# Patient Record
Sex: FEMALE | Race: ASIAN | NOT HISPANIC OR LATINO | URBAN - METROPOLITAN AREA
[De-identification: names, ages, dates, MRNs, and addresses within clinical notes are randomized per-mention and may not be internally consistent; named-entity substitution may affect disease eponyms.]

---

## 2023-01-21 ENCOUNTER — INPATIENT (INPATIENT)
Facility: HOSPITAL | Age: 68
LOS: 5 days | Discharge: HOME | DRG: 280 | End: 2023-01-27
Attending: HOSPITALIST | Admitting: INTERNAL MEDICINE
Payer: COMMERCIAL

## 2023-01-21 VITALS
SYSTOLIC BLOOD PRESSURE: 126 MMHG | RESPIRATION RATE: 20 BRPM | WEIGHT: 160.06 LBS | HEIGHT: 65 IN | HEART RATE: 95 BPM | DIASTOLIC BLOOD PRESSURE: 78 MMHG | OXYGEN SATURATION: 95 % | TEMPERATURE: 100 F

## 2023-01-21 DIAGNOSIS — I21.4 NON-ST ELEVATION (NSTEMI) MYOCARDIAL INFARCTION: ICD-10-CM

## 2023-01-21 LAB
ALBUMIN SERPL ELPH-MCNC: 3.9 G/DL — SIGNIFICANT CHANGE UP (ref 3.3–5)
ALP SERPL-CCNC: 88 U/L — SIGNIFICANT CHANGE UP (ref 40–120)
ALT FLD-CCNC: 70 U/L — HIGH (ref 10–45)
ANION GAP SERPL CALC-SCNC: 14 MMOL/L — SIGNIFICANT CHANGE UP (ref 5–17)
APTT BLD: 28.2 SEC — SIGNIFICANT CHANGE UP (ref 27.5–35.5)
AST SERPL-CCNC: 333 U/L — HIGH (ref 10–40)
BASOPHILS # BLD AUTO: 0.01 K/UL — SIGNIFICANT CHANGE UP (ref 0–0.2)
BASOPHILS NFR BLD AUTO: 0.1 % — SIGNIFICANT CHANGE UP (ref 0–2)
BILIRUB SERPL-MCNC: 0.8 MG/DL — SIGNIFICANT CHANGE UP (ref 0.2–1.2)
BUN SERPL-MCNC: 10 MG/DL — SIGNIFICANT CHANGE UP (ref 7–23)
CALCIUM SERPL-MCNC: 9.7 MG/DL — SIGNIFICANT CHANGE UP (ref 8.4–10.5)
CHLORIDE SERPL-SCNC: 93 MMOL/L — LOW (ref 96–108)
CO2 SERPL-SCNC: 23 MMOL/L — SIGNIFICANT CHANGE UP (ref 22–31)
CREAT SERPL-MCNC: 0.6 MG/DL — SIGNIFICANT CHANGE UP (ref 0.5–1.3)
D DIMER BLD IA.RAPID-MCNC: 621 NG/ML DDU — HIGH
EGFR: 99 ML/MIN/1.73M2 — SIGNIFICANT CHANGE UP
EOSINOPHIL # BLD AUTO: 0 K/UL — SIGNIFICANT CHANGE UP (ref 0–0.5)
EOSINOPHIL NFR BLD AUTO: 0 % — SIGNIFICANT CHANGE UP (ref 0–6)
FLUAV AG NPH QL: SIGNIFICANT CHANGE UP
FLUBV AG NPH QL: SIGNIFICANT CHANGE UP
GLUCOSE SERPL-MCNC: 128 MG/DL — HIGH (ref 70–99)
HCT VFR BLD CALC: 42.1 % — SIGNIFICANT CHANGE UP (ref 34.5–45)
HGB BLD-MCNC: 13.8 G/DL — SIGNIFICANT CHANGE UP (ref 11.5–15.5)
IMM GRANULOCYTES NFR BLD AUTO: 0.6 % — SIGNIFICANT CHANGE UP (ref 0–0.9)
INR BLD: 1.11 RATIO — SIGNIFICANT CHANGE UP (ref 0.88–1.16)
LYMPHOCYTES # BLD AUTO: 1.15 K/UL — SIGNIFICANT CHANGE UP (ref 1–3.3)
LYMPHOCYTES # BLD AUTO: 8 % — LOW (ref 13–44)
MAGNESIUM SERPL-MCNC: 1.7 MG/DL — SIGNIFICANT CHANGE UP (ref 1.6–2.6)
MCHC RBC-ENTMCNC: 29.1 PG — SIGNIFICANT CHANGE UP (ref 27–34)
MCHC RBC-ENTMCNC: 32.8 GM/DL — SIGNIFICANT CHANGE UP (ref 32–36)
MCV RBC AUTO: 88.6 FL — SIGNIFICANT CHANGE UP (ref 80–100)
MONOCYTES # BLD AUTO: 1.18 K/UL — HIGH (ref 0–0.9)
MONOCYTES NFR BLD AUTO: 8.2 % — SIGNIFICANT CHANGE UP (ref 2–14)
NEUTROPHILS # BLD AUTO: 11.9 K/UL — HIGH (ref 1.8–7.4)
NEUTROPHILS NFR BLD AUTO: 83.1 % — HIGH (ref 43–77)
NRBC # BLD: 0 /100 WBCS — SIGNIFICANT CHANGE UP (ref 0–0)
NT-PROBNP SERPL-SCNC: 2945 PG/ML — HIGH (ref 0–300)
PLATELET # BLD AUTO: 334 K/UL — SIGNIFICANT CHANGE UP (ref 150–400)
POTASSIUM SERPL-MCNC: 4.2 MMOL/L — SIGNIFICANT CHANGE UP (ref 3.5–5.3)
POTASSIUM SERPL-SCNC: 4.2 MMOL/L — SIGNIFICANT CHANGE UP (ref 3.5–5.3)
PROT SERPL-MCNC: 8.2 G/DL — SIGNIFICANT CHANGE UP (ref 6–8.3)
PROTHROM AB SERPL-ACNC: 12.9 SEC — SIGNIFICANT CHANGE UP (ref 10.5–13.4)
RBC # BLD: 4.75 M/UL — SIGNIFICANT CHANGE UP (ref 3.8–5.2)
RBC # FLD: 13.5 % — SIGNIFICANT CHANGE UP (ref 10.3–14.5)
RSV RNA NPH QL NAA+NON-PROBE: SIGNIFICANT CHANGE UP
SARS-COV-2 RNA SPEC QL NAA+PROBE: SIGNIFICANT CHANGE UP
SODIUM SERPL-SCNC: 130 MMOL/L — LOW (ref 135–145)
TROPONIN T, HIGH SENSITIVITY RESULT: 3979 NG/L — HIGH (ref 0–51)
WBC # BLD: 14.32 K/UL — HIGH (ref 3.8–10.5)
WBC # FLD AUTO: 14.32 K/UL — HIGH (ref 3.8–10.5)

## 2023-01-21 PROCEDURE — 99291 CRITICAL CARE FIRST HOUR: CPT

## 2023-01-21 PROCEDURE — 71275 CT ANGIOGRAPHY CHEST: CPT | Mod: 26,MA

## 2023-01-21 PROCEDURE — 71046 X-RAY EXAM CHEST 2 VIEWS: CPT | Mod: 26

## 2023-01-21 RX ORDER — ATORVASTATIN CALCIUM 80 MG/1
80 TABLET, FILM COATED ORAL AT BEDTIME
Refills: 0 | Status: DISCONTINUED | OUTPATIENT
Start: 2023-01-21 | End: 2023-01-27

## 2023-01-21 RX ORDER — FUROSEMIDE 40 MG
40 TABLET ORAL ONCE
Refills: 0 | Status: COMPLETED | OUTPATIENT
Start: 2023-01-21 | End: 2023-01-21

## 2023-01-21 RX ORDER — TICAGRELOR 90 MG/1
180 TABLET ORAL ONCE
Refills: 0 | Status: COMPLETED | OUTPATIENT
Start: 2023-01-21 | End: 2023-01-21

## 2023-01-21 RX ORDER — ASPIRIN/CALCIUM CARB/MAGNESIUM 324 MG
324 TABLET ORAL ONCE
Refills: 0 | Status: COMPLETED | OUTPATIENT
Start: 2023-01-21 | End: 2023-01-21

## 2023-01-21 RX ORDER — HEPARIN SODIUM 5000 [USP'U]/ML
5000 INJECTION INTRAVENOUS; SUBCUTANEOUS ONCE
Refills: 0 | Status: COMPLETED | OUTPATIENT
Start: 2023-01-21 | End: 2023-01-21

## 2023-01-21 RX ORDER — HEPARIN SODIUM 5000 [USP'U]/ML
INJECTION INTRAVENOUS; SUBCUTANEOUS
Qty: 25000 | Refills: 0 | Status: DISCONTINUED | OUTPATIENT
Start: 2023-01-21 | End: 2023-01-22

## 2023-01-21 RX ORDER — HEPARIN SODIUM 5000 [USP'U]/ML
5000 INJECTION INTRAVENOUS; SUBCUTANEOUS EVERY 6 HOURS
Refills: 0 | Status: DISCONTINUED | OUTPATIENT
Start: 2023-01-21 | End: 2023-01-22

## 2023-01-21 RX ORDER — ASPIRIN/CALCIUM CARB/MAGNESIUM 324 MG
81 TABLET ORAL DAILY
Refills: 0 | Status: DISCONTINUED | OUTPATIENT
Start: 2023-01-21 | End: 2023-01-27

## 2023-01-21 RX ADMIN — Medication 324 MILLIGRAM(S): at 18:47

## 2023-01-21 RX ADMIN — TICAGRELOR 180 MILLIGRAM(S): 90 TABLET ORAL at 18:47

## 2023-01-21 RX ADMIN — Medication 40 MILLIGRAM(S): at 22:39

## 2023-01-21 RX ADMIN — HEPARIN SODIUM 1000 UNIT(S)/HR: 5000 INJECTION INTRAVENOUS; SUBCUTANEOUS at 18:48

## 2023-01-21 RX ADMIN — HEPARIN SODIUM 5000 UNIT(S): 5000 INJECTION INTRAVENOUS; SUBCUTANEOUS at 18:48

## 2023-01-21 NOTE — ED ADULT NURSE NOTE - OBJECTIVE STATEMENT
67y female w/ no known medical history presents to ED w/ chest pain. Pt states she began to have chest pain last night while at rest. The pain does not radiate but also endorses abdominal pain and lower back pain. Pt also states that she has been having intermittent nausea with one episode of vomiting last night. Pt went to urgent care today where they performed an EKG; As per patient, urgent care advised her to come to ED due to abnormal EKG. Pt is placed on cardiac monitor in the ED in normal sinus rhythm. Pt denies fever, chills, sob, diarrhea urinary symptoms. Pt is ambulatory.

## 2023-01-21 NOTE — H&P ADULT - NSHPLABSRESULTS_GEN_ALL_CORE
13.8   14.32 )-----------( 334      ( 21 Jan 2023 17:18 )             42.1     01-21    130<L>  |  93<L>  |  10  ----------------------------<  128<H>  4.2   |  23  |  0.60    Ca    9.7      21 Jan 2023 17:18  Mg     1.7     01-21    TPro  8.2  /  Alb  3.9  /  TBili  0.8  /  DBili  x   /  AST  333<H>  /  ALT  70<H>  /  AlkPhos  88  01-21

## 2023-01-21 NOTE — H&P ADULT - NSICDXPASTMEDICALHX_GEN_ALL_CORE_FT
EGD & Upper EUS with radial scope then linear with FNA  Estimated body mass index is 22.62 kg/m² as calculated from the following:    Height as of 9/6/22: 6' 3\" (1.905 m).    Weight as of 10/4/22: 82.1 kg (181 lb).   PAST MEDICAL HISTORY:  No pertinent past medical history

## 2023-01-21 NOTE — ED PROVIDER NOTE - NS ED ROS FT
Gen: Denies fever, weight loss; +lightheadedness  HEENT: Denies vision changes, ear pain, epistaxis, sore throat  CV: Denies palpitations; +chest pain, +diaphoresis  Skin: Denies rash, erythema, color changes  Resp: Denies SOB, cough  Endo: Denies sensitivity to heat, cold, increased urination  GI: Denies abdominal pain, constipation, diarrhea; + nausea, +vomiting  Msk: Denies back pain, LE swelling, extremity pain  : Denies dysuria, increased frequency  Neuro: Denies LOC, weakness, numbness, tingling  Psych: Denies hx of psych, hallucinations  ROS statement: all other ROS negative except as per HPI

## 2023-01-21 NOTE — ED PROVIDER NOTE - PROGRESS NOTE DETAILS
Katiana Adams PGY3: Labs show elevated trop 3979 and proBNP 2945 i/s/o nml SCr 0.60 as well as elevated D-dimer 621. CXR also concerning for small b/l pleural effusions. Concerning for NSTEMI vs. new-onset CHF vs. PE. Cardiology consulted. Recommended to send for CTA to r/o PE and to start ASA/Brilinta/Heparin gtt. Pt made aware who agreed with plan and further testing. Will await CTA results and further cards recs. Ne, PGY3: No PE on CTA. Discussed with cards. Will admit to CCU.

## 2023-01-21 NOTE — H&P ADULT - NSHPPHYSICALEXAM_GEN_ALL_CORE
T(C): 36.7 (01-21-23 @ 21:12), Max: 37.6 (01-21-23 @ 16:44)  HR: 89 (01-21-23 @ 21:12) (85 - 95)  BP: 107/69 (01-21-23 @ 21:12) (107/69 - 126/78)  RR: 18 (01-21-23 @ 21:12) (17 - 20)  SpO2: 97% (01-21-23 @ 21:12) (95% - 97%)    CONSTITUTIONAL: Well groomed, no apparent distress  EYES: PERRLA and symmetric, EOMI, No conjunctival or scleral injection, non-icteric  ENMT: Oral mucosa with moist membranes. Normal dentition; no pharyngeal injection or exudates             NECK: Supple, symmetric and without tracheal deviation   RESP: No respiratory distress, no use of accessory muscles; CTA b/l, no WRR  CV: RRR, +S1S2, no MRG; no JVD; no peripheral edema  GI: Soft, NT, ND, no rebound, no guarding; no palpable masses; no hepatosplenomegaly; no hernia palpated  LYMPH: No cervical LAD or tenderness; no axillary LAD or tenderness; no inguinal LAD or tenderness  MSK: Normal gait; No digital clubbing or cyanosis; examination of the (head/neck/spine/ribs/pelvis, RUE, LUE, RLE, LLE) without misalignment,            Normal ROM without pain, no spinal tenderness, normal muscle strength/tone  SKIN: No rashes or ulcers noted; no subcutaneous nodules or induration palpable  NEURO: CN II-XII intact; normal reflexes in upper and lower extremities, sensation intact in upper and lower extremities b/l to light touch   PSYCH: Appropriate insight/judgment; A+O x 3, mood and affect appropriate, recent/remote memory intact

## 2023-01-21 NOTE — ED PROVIDER NOTE - PHYSICAL EXAMINATION
PHYSICAL EXAM:  GENERAL: non-toxic appearing; in no respiratory distress  HEENT: Atraumatic, Normocephalic, PERRL, EOMs intact b/l w/out deficits, no conjunctival pallor, MMM  NECK: No JVD; FROM  CHEST/LUNG: CTAB no wheezes/rhonchi/rales  HEART: RRR, systolic ejection murmur  ABDOMEN: +BS, soft, NT, ND  EXTREMITIES: No LE edema, +2 radial pulses b/l, +2 DP/PT pulses b/l  MUSCULOSKELETAL: FROM of all 4 extremities  NERVOUS SYSTEM:  A&Ox3, No motor deficits or sensory deficits; no focal neurologic deficits  SKIN:  No new rashes

## 2023-01-21 NOTE — CONSULT NOTE ADULT - SUBJECTIVE AND OBJECTIVE BOX
CARDIOLOGY FELLOW CONSULT NOTE    HPI: 67-year-old female, denies past medical history, presents to ED complaining of pleuritic midsternal chest pain with deep inspiration. This started around yesterday, and was ssociated with  since yesterday associated with lightheadedness diaphoresis backaches and nausea/vomiting.  Patient has never had similar symptoms in the past.  Patient tried's (last Motrin/Tylenol with mild relief.  Patient went to urgent care today and EKG was noted to be abnormal.  Patient's daughter is NP and auscultated the heart and noticed new systolic murmur.  Patient does not have a cardiologist and a provider in many years.  Patient denies fever/chills, shortness of breath, abdominal pain, diarrhea, recent travel, recent surgeries, hemoptysis, urinary symptoms, lower extremity edema or any other symptoms at this time.    PMHx:   No pertinent past medical history    PSHx:     Allergies:  No Known Allergies    Home Meds: none    Current Medications:   furosemide   Injectable 40 milliGRAM(s) IV Push Once  heparin   Injectable 5000 Unit(s) IV Push every 6 hours PRN  heparin  Infusion.  Unit(s)/Hr IV Continuous <Continuous>    FAMILY HISTORY:  Denies    Social History: nonsmoker, nondrinker, no illicits. Daughter works in [employer] as an NP     REVIEW OF SYSTEMS: 14pt ros neg unless stated above    Physical Exam:  T(F): 98.1 (01-21), Max: 99.7 (01-21)  HR: 89 (01-21) (85 - 95)  BP: 107/69 (01-21) (107/69 - 126/78)  RR: 18 (01-21)  SpO2: 97% (01-21)  GENERAL: \mild distress, unable to take deep breaths in. reported pleuritic chest pain   HEAD:  Atraumatic, Normocephalic  ENT: EOMI, PERRLA, conjunctiva and sclera clear, Neck supple, JVD to midneck  CHEST/LUNG: decreased breath sounds at the bases, inspiratory crackles   BACK: No spinal tenderness  HEART: Regular rate and rhythm; No murmurs, rubs, or gallops  ABDOMEN: Soft, Nontender, Nondistended; Bowel sounds present  EXTREMITIES:  No clubbing, cyanosis, or edema  PSYCH: Nl behavior, nl affect  NEUROLOGY: AAOx3, non-focal, cranial nerves intact  SKIN: Normal color, No rashes or lesions    ECG: no prior EKG in the system  sinus rhythm, left axis deviation, with right bundyloid pattern "brugada type i pattern", with slurred s waves inferiorly and laterally    CXR: Hilar congestion and bilateral pleural effusions    Labs: Personally reviewed                        13.8   14.32 )-----------( 334      ( 21 Jan 2023 17:18 )             42.1     01-21    130<L>  |  93<L>  |  10  ----------------------------<  128<H>  4.2   |  23  |  0.60    Ca    9.7      21 Jan 2023 17:18  Mg     1.7     01-21    TPro  8.2  /  Alb  3.9  /  TBili  0.8  /  DBili  x   /  AST  333<H>  /  ALT  70<H>  /  AlkPhos  88  01-21    PT/INR - ( 21 Jan 2023 17:18 )   PT: 12.9 sec;   INR: 1.11 ratio         PTT - ( 21 Jan 2023 17:18 )  PTT:28.2 sec    CARDIAC MARKERS ( 21 Jan 2023 17:18 )  3979 ng/L / x     / x     / 2941 U/L / x     / 233.1 ng/mL        Serum Pro-Brain Natriuretic Peptide: 2945 pg/mL (01-21 @ 17:18)   CARDIOLOGY FELLOW CONSULT NOTE    HPI: 67-year-old female, denies past medical history, presents to ED complaining of pleuritic midsternal chest pain with deep inspiration. This started around yesterdayand was associated with diaphoresis, lightheadedness and nausea/vomiting x1.  Patient has never had similar symptoms in the past, no prior anginal equivalents. Daughter tried the following medication cocktail of aspirin 325mg, motrin/tylenol, and GI cocktail with temporary relief but would recur each and every time.    Patient went to urgent care today and EKG was noted to be abnormal, so she was triaged to ED.  Patient does not have a cardiologist, nor does she have a GP. Reportedly no PMHx. On ROS, Patient denies fever/chills, shortness of breath, abdominal pain, diarrhea, recent travel, recent surgeries, hemoptysis, urinary symptoms, lower extremity edema.    I saw patient in the ED where she was with BP 120s/70s, HR 80s-90s in sinus rhythm. She was complaining of orthopnea and shallow breaths limited by pleuritic pain but did not have accessory muscle use. Labs with leukocytosis to 14.32, Trop T 3979, CK 2941, CKMB 233.1, PK mass assay % 7.9, proBNP 2945. d-dimer 621.  COVID/flu/rsv negative  XR showed bilateral pleural effusions and pulmonary congestion. CTPE ordered by ED without PE.  EKG with RBBB (Unknown chronicity) and slurred S waves inferolaterally. Query q waves in V1-V2 with coved STE2-3mm in V1-V2  Patient was triaged to CICU for further management, for concern late presentation MI      In the ED, she received dapt, hep bolus and gtt, and lasix 40mg to good effect    PMHx:   No pertinent past medical history    PSHx: Denies    Allergies:  No Known Allergies    Home Meds: none    Current Medications:   furosemide   Injectable 40 milliGRAM(s) IV Push Once  heparin   Injectable 5000 Unit(s) IV Push every 6 hours PRN  heparin  Infusion.  Unit(s)/Hr IV Continuous <Continuous>    FAMILY HISTORY:  Denies    Social History: nonsmoker, nondrinker, no illicits. Daughter works in [employer] as an NP  Patient works as an ECG tech     REVIEW OF SYSTEMS: 14pt ros neg unless stated above    Physical Exam:  T(F): 98.1 (01-21), Max: 99.7 (01-21)  HR: 89 (01-21) (85 - 95)  BP: 107/69 (01-21) (107/69 - 126/78)  RR: 18 (01-21)  SpO2: 97% (01-21)  GENERAL: \mild distress, unable to take deep breaths in. reported pleuritic chest pain   HEAD:  Atraumatic, Normocephalic  ENT: EOMI, PERRLA, conjunctiva and sclera clear, Neck supple, JVD to midneck  CHEST/LUNG: decreased breath sounds at the bases, inspiratory crackles   BACK: No spinal tenderness  HEART: Regular rate and rhythm; No murmurs, rubs, or gallops  ABDOMEN: Soft, Nontender, Nondistended; Bowel sounds present  EXTREMITIES:  No clubbing, cyanosis, or edema  PSYCH: Nl behavior, nl affect  NEUROLOGY: AAOx3, non-focal, cranial nerves intact  SKIN: Normal color, No rashes or lesions    ECG: no prior EKG in the system  sinus rhythm, left axis deviation, with right bundyloid pattern "brugada type i pattern", with slurred s waves inferiorly and laterally    CXR: Hilar congestion and bilateral pleural effusions    Labs: Personally reviewed                        13.8   14.32 )-----------( 334      ( 21 Jan 2023 17:18 )             42.1     01-21    130<L>  |  93<L>  |  10  ----------------------------<  128<H>  4.2   |  23  |  0.60    Ca    9.7      21 Jan 2023 17:18  Mg     1.7     01-21    TPro  8.2  /  Alb  3.9  /  TBili  0.8  /  DBili  x   /  AST  333<H>  /  ALT  70<H>  /  AlkPhos  88  01-21    PT/INR - ( 21 Jan 2023 17:18 )   PT: 12.9 sec;   INR: 1.11 ratio         PTT - ( 21 Jan 2023 17:18 )  PTT:28.2 sec    CARDIAC MARKERS ( 21 Jan 2023 17:18 )  3979 ng/L / x     / x     / 2941 U/L / x     / 233.1 ng/mL        Serum Pro-Brain Natriuretic Peptide: 2945 pg/mL (01-21 @ 17:18)

## 2023-01-21 NOTE — ED PROVIDER NOTE - OBJECTIVE STATEMENT
67-year-old female, denies past medical history, presents to ED complaining of pleuritic midsternal chest pain since yesterday associated with lightheadedness diaphoresis backaches and nausea/vomiting.  Patient has never had similar symptoms in the past.  Patient tried's (last Motrin/Tylenol with mild relief.  Patient went to urgent care today and EKG was noted to be abnormal.  Patient's daughter is NP and auscultated the heart and noticed new systolic murmur.  Patient does not have a cardiologist and a provider in many years.  Patient denies fever/chills, shortness of breath, abdominal pain, diarrhea, recent travel, recent surgeries, hemoptysis, urinary symptoms, lower extremity edema or any other symptoms at this time.

## 2023-01-21 NOTE — H&P ADULT - HISTORY OF PRESENT ILLNESS
67-year-old female, denies past medical history, presents to ED complaining of pleuritic midsternal chest pain w deep inspiration since yesterday associated with lightheadedness diaphoresis backaches and nausea/vomiting.  Patient has never had similar symptoms in the past.  Patient tried's (last Motrin/Tylenol with mild relief.  Patient went to urgent care today and EKG was noted to be abnormal.  Patient's daughter is NP and auscultated the heart and noticed new systolic murmur.  Patient does not have a cardiologist and a provider in many years.  Patient denies fever/chills, shortness of breath, abdominal pain, diarrhea, recent travel, recent surgeries, hemoptysis, urinary symptoms, lower extremity edema or any other symptoms at this time.    Pt found to have elevated cardiac enzymes and EKG concerning for a late presentation MI. Pt transferred to CCU for further mgmt.

## 2023-01-21 NOTE — H&P ADULT - NSHPREVIEWOFSYSTEMS_GEN_ALL_CORE
REVIEW OF SYSTEMS  General:	  Skin/Breast:  Ophthalmologic:	  ENMT:	  Respiratory and Thorax:	  Cardiovascular:	  Gastrointestinal:	  Genitourinary:	  Musculoskeletal:	  Neurological:	  Psychiatric:	  Hematology/Lymphatics:	  Endocrine:	  Allergic/Immunologic:

## 2023-01-21 NOTE — ED ADULT NURSE REASSESSMENT NOTE - NS ED NURSE REASSESS COMMENT FT1
Hand-off received from LAUREN Horne. Pt is A&Ox4, follows commands, breathing spontaneous and unlabored. Pt currently has Heparin going at 10 ml/hr. Pt to be transported to CICU, awaiting CICU MD for transport.

## 2023-01-21 NOTE — ED PROVIDER NOTE - CLINICAL SUMMARY MEDICAL DECISION MAKING FREE TEXT BOX
67-year-old female, denies past medical history, presents to ED complaining of pleuritic midsternal chest pain since yesterday. Denies f/c, SOB, abd pain, urinary sx, LE edema.  VSS. Physical exam significant for systolic murmur and CTAB lungs. EKG w/ non-specific TWI, but no overt SHARON.  DDx includes ACS vs. PE vs. Viral syndrome  Plan to check basic labs, trop, bnp, d-dimer, and CXR. Pt likely will require admission for stress/echo. 67-year-old female, denies past medical history, presents to ED complaining of pleuritic midsternal chest pain since yesterday. Denies f/c, SOB, abd pain, urinary sx, LE edema.  VSS. Physical exam significant for systolic murmur and CTAB lungs. EKG w/ non-specific TWI, but no overt SHARON.  DDx includes ACS vs. PE vs. Viral syndrome  Plan to check basic labs, trop, bnp, d-dimer, and CXR. Pt likely will require admission for stress/echo.    see attending attestation authored by me, John Stephens MD, for additional MDM details.

## 2023-01-21 NOTE — CONSULT NOTE ADULT - ASSESSMENT
67-year-old female, denies past medical history, presents to ED complaining of pleuritic midsternal chest pain. Her clinical picture ultimately consistent and concerning for late presentation MI. Her EKG shows a right bundyloid pattern, automated read as "Brugada I" but alternatively with q waves in septal region with coved SHARON 2-3mm.   She is now ~36h from symptom onset. On exam, she was originally with crackles bilaterally and JVD to angle of mandible.   She otherwise does not have a significant DILIA that would suggest MR from ruptured papillary or VSD.     Plan:  - CICU for high risk ACS  - TTE   - DAPT and atorva, continue hep bolus and gtt  - Diurese until can lay flat. Hold beta blocker as she was killips class II on presentation   - Consideration of LHC in the AM when more euvolemic    Case discussed overnight with Dr. Martínez Baron, interventional attending on call.

## 2023-01-21 NOTE — ED PROVIDER NOTE - ATTENDING CONTRIBUTION TO CARE
66 yo female p/w intermittent chest pain.  EKG here with ?st elevation though isolated primarily to V2.  sent CBC, CMP, trop -- first trop markedly elevated.  dimer elevated as well though lower suspicion for PE.  cards consulted -- start heparin, antithrombotics.  will get CT angio chest, then admit for further ACS management.

## 2023-01-21 NOTE — ED PROVIDER NOTE - INTERPRETATION
EKG reviewed for rate, rhythm, axis, intervals and segments, including QRS morphology, P wave appearance T wave appearance, FL interval, and QT interval.  I find the EKG to be unremarkable in all of these regards except as follows: RBBB, ?St elev V2

## 2023-01-22 LAB
A1C WITH ESTIMATED AVERAGE GLUCOSE RESULT: 5.4 % — SIGNIFICANT CHANGE UP (ref 4–5.6)
ALBUMIN SERPL ELPH-MCNC: 4 G/DL — SIGNIFICANT CHANGE UP (ref 3.3–5)
ALP SERPL-CCNC: 94 U/L — SIGNIFICANT CHANGE UP (ref 40–120)
ALT FLD-CCNC: 90 U/L — HIGH (ref 10–45)
ANION GAP SERPL CALC-SCNC: 14 MMOL/L — SIGNIFICANT CHANGE UP (ref 5–17)
APTT BLD: 62.6 SEC — HIGH (ref 27.5–35.5)
APTT BLD: 65 SEC — HIGH (ref 27.5–35.5)
AST SERPL-CCNC: 329 U/L — HIGH (ref 10–40)
BASOPHILS # BLD AUTO: 0.02 K/UL — SIGNIFICANT CHANGE UP (ref 0–0.2)
BASOPHILS NFR BLD AUTO: 0.1 % — SIGNIFICANT CHANGE UP (ref 0–2)
BILIRUB SERPL-MCNC: 1.4 MG/DL — HIGH (ref 0.2–1.2)
BLD GP AB SCN SERPL QL: NEGATIVE — SIGNIFICANT CHANGE UP
BUN SERPL-MCNC: 9 MG/DL — SIGNIFICANT CHANGE UP (ref 7–23)
CALCIUM SERPL-MCNC: 9.6 MG/DL — SIGNIFICANT CHANGE UP (ref 8.4–10.5)
CHLORIDE SERPL-SCNC: 90 MMOL/L — LOW (ref 96–108)
CK MB BLD-MCNC: 4.7 % — HIGH (ref 0–3.5)
CK MB BLD-MCNC: 5.5 % — HIGH (ref 0–3.5)
CK MB CFR SERPL CALC: 129 NG/ML — HIGH (ref 0–3.8)
CK MB CFR SERPL CALC: 94.2 NG/ML — HIGH (ref 0–3.8)
CK SERPL-CCNC: 2013 U/L — HIGH (ref 25–170)
CK SERPL-CCNC: 2366 U/L — HIGH (ref 25–170)
CO2 SERPL-SCNC: 26 MMOL/L — SIGNIFICANT CHANGE UP (ref 22–31)
CREAT SERPL-MCNC: 0.64 MG/DL — SIGNIFICANT CHANGE UP (ref 0.5–1.3)
EGFR: 97 ML/MIN/1.73M2 — SIGNIFICANT CHANGE UP
EOSINOPHIL # BLD AUTO: 0 K/UL — SIGNIFICANT CHANGE UP (ref 0–0.5)
EOSINOPHIL NFR BLD AUTO: 0 % — SIGNIFICANT CHANGE UP (ref 0–6)
ESTIMATED AVERAGE GLUCOSE: 108 MG/DL — SIGNIFICANT CHANGE UP (ref 68–114)
GLUCOSE SERPL-MCNC: 115 MG/DL — HIGH (ref 70–99)
HCT VFR BLD CALC: 40.9 % — SIGNIFICANT CHANGE UP (ref 34.5–45)
HGB BLD-MCNC: 13.4 G/DL — SIGNIFICANT CHANGE UP (ref 11.5–15.5)
IMM GRANULOCYTES NFR BLD AUTO: 0.4 % — SIGNIFICANT CHANGE UP (ref 0–0.9)
INR BLD: 1.36 RATIO — HIGH (ref 0.88–1.16)
LACTATE SERPL-SCNC: 2.1 MMOL/L — HIGH (ref 0.5–2)
LACTATE SERPL-SCNC: 2.6 MMOL/L — HIGH (ref 0.5–2)
LYMPHOCYTES # BLD AUTO: 1.2 K/UL — SIGNIFICANT CHANGE UP (ref 1–3.3)
LYMPHOCYTES # BLD AUTO: 7.7 % — LOW (ref 13–44)
MAGNESIUM SERPL-MCNC: 1.7 MG/DL — SIGNIFICANT CHANGE UP (ref 1.6–2.6)
MCHC RBC-ENTMCNC: 29 PG — SIGNIFICANT CHANGE UP (ref 27–34)
MCHC RBC-ENTMCNC: 32.8 GM/DL — SIGNIFICANT CHANGE UP (ref 32–36)
MCV RBC AUTO: 88.5 FL — SIGNIFICANT CHANGE UP (ref 80–100)
MONOCYTES # BLD AUTO: 1.67 K/UL — HIGH (ref 0–0.9)
MONOCYTES NFR BLD AUTO: 10.7 % — SIGNIFICANT CHANGE UP (ref 2–14)
NEUTROPHILS # BLD AUTO: 12.62 K/UL — HIGH (ref 1.8–7.4)
NEUTROPHILS NFR BLD AUTO: 81.1 % — HIGH (ref 43–77)
NRBC # BLD: 0 /100 WBCS — SIGNIFICANT CHANGE UP (ref 0–0)
PHOSPHATE SERPL-MCNC: 2.5 MG/DL — SIGNIFICANT CHANGE UP (ref 2.5–4.5)
PLATELET # BLD AUTO: 343 K/UL — SIGNIFICANT CHANGE UP (ref 150–400)
POTASSIUM SERPL-MCNC: 3.9 MMOL/L — SIGNIFICANT CHANGE UP (ref 3.5–5.3)
POTASSIUM SERPL-SCNC: 3.9 MMOL/L — SIGNIFICANT CHANGE UP (ref 3.5–5.3)
PROT SERPL-MCNC: 8.3 G/DL — SIGNIFICANT CHANGE UP (ref 6–8.3)
PROTHROM AB SERPL-ACNC: 15.8 SEC — HIGH (ref 10.5–13.4)
RBC # BLD: 4.62 M/UL — SIGNIFICANT CHANGE UP (ref 3.8–5.2)
RBC # FLD: 13.5 % — SIGNIFICANT CHANGE UP (ref 10.3–14.5)
RH IG SCN BLD-IMP: POSITIVE — SIGNIFICANT CHANGE UP
RH IG SCN BLD-IMP: POSITIVE — SIGNIFICANT CHANGE UP
SODIUM SERPL-SCNC: 130 MMOL/L — LOW (ref 135–145)
TROPONIN T, HIGH SENSITIVITY RESULT: 3728 NG/L — HIGH (ref 0–51)
TROPONIN T, HIGH SENSITIVITY RESULT: 4215 NG/L — HIGH (ref 0–51)
TSH SERPL-MCNC: 0.49 UIU/ML — SIGNIFICANT CHANGE UP (ref 0.27–4.2)
WBC # BLD: 15.57 K/UL — HIGH (ref 3.8–10.5)
WBC # FLD AUTO: 15.57 K/UL — HIGH (ref 3.8–10.5)

## 2023-01-22 PROCEDURE — 93306 TTE W/DOPPLER COMPLETE: CPT | Mod: 26

## 2023-01-22 PROCEDURE — 99233 SBSQ HOSP IP/OBS HIGH 50: CPT

## 2023-01-22 PROCEDURE — 99292 CRITICAL CARE ADDL 30 MIN: CPT

## 2023-01-22 PROCEDURE — 93458 L HRT ARTERY/VENTRICLE ANGIO: CPT | Mod: 26

## 2023-01-22 PROCEDURE — 93010 ELECTROCARDIOGRAM REPORT: CPT

## 2023-01-22 RX ORDER — TICAGRELOR 90 MG/1
90 TABLET ORAL EVERY 12 HOURS
Refills: 0 | Status: DISCONTINUED | OUTPATIENT
Start: 2023-01-23 | End: 2023-01-27

## 2023-01-22 RX ORDER — INFLUENZA VIRUS VACCINE 15; 15; 15; 15 UG/.5ML; UG/.5ML; UG/.5ML; UG/.5ML
0.7 SUSPENSION INTRAMUSCULAR ONCE
Refills: 0 | Status: DISCONTINUED | OUTPATIENT
Start: 2023-01-22 | End: 2023-01-27

## 2023-01-22 RX ORDER — POTASSIUM PHOSPHATE, MONOBASIC POTASSIUM PHOSPHATE, DIBASIC 236; 224 MG/ML; MG/ML
15 INJECTION, SOLUTION INTRAVENOUS ONCE
Refills: 0 | Status: COMPLETED | OUTPATIENT
Start: 2023-01-22 | End: 2023-01-22

## 2023-01-22 RX ORDER — METOPROLOL TARTRATE 50 MG
12.5 TABLET ORAL
Refills: 0 | Status: DISCONTINUED | OUTPATIENT
Start: 2023-01-22 | End: 2023-01-25

## 2023-01-22 RX ORDER — HEPARIN SODIUM 5000 [USP'U]/ML
1000 INJECTION INTRAVENOUS; SUBCUTANEOUS
Qty: 25000 | Refills: 0 | Status: DISCONTINUED | OUTPATIENT
Start: 2023-01-22 | End: 2023-01-23

## 2023-01-22 RX ORDER — MAGNESIUM SULFATE 500 MG/ML
2 VIAL (ML) INJECTION ONCE
Refills: 0 | Status: COMPLETED | OUTPATIENT
Start: 2023-01-22 | End: 2023-01-22

## 2023-01-22 RX ADMIN — HEPARIN SODIUM 10 UNIT(S)/HR: 5000 INJECTION INTRAVENOUS; SUBCUTANEOUS at 01:41

## 2023-01-22 RX ADMIN — POTASSIUM PHOSPHATE, MONOBASIC POTASSIUM PHOSPHATE, DIBASIC 62.5 MILLIMOLE(S): 236; 224 INJECTION, SOLUTION INTRAVENOUS at 08:00

## 2023-01-22 RX ADMIN — ATORVASTATIN CALCIUM 80 MILLIGRAM(S): 80 TABLET, FILM COATED ORAL at 22:04

## 2023-01-22 RX ADMIN — HEPARIN SODIUM 10 UNIT(S)/HR: 5000 INJECTION INTRAVENOUS; SUBCUTANEOUS at 18:09

## 2023-01-22 RX ADMIN — HEPARIN SODIUM 10 UNIT(S)/HR: 5000 INJECTION INTRAVENOUS; SUBCUTANEOUS at 08:00

## 2023-01-22 RX ADMIN — Medication 81 MILLIGRAM(S): at 18:01

## 2023-01-22 RX ADMIN — Medication 100 GRAM(S): at 05:44

## 2023-01-22 NOTE — PROGRESS NOTE ADULT - CRITICAL CARE ATTENDING COMMENT
68yo female who presented last night with CP and SOB. ECg concerning for recent MI but in acute systolic heart failure. Responded to diuresis overnight. Now on room air and will proceed with cardiac cath. Loaded with DAPT. On heparin drip and will stop in preparation for cath this AM. Statin started. Consent obtained. 68yo female who presented last night with CP and SOB. ECg concerning for recent MI but in acute systolic heart failure. Responded to diuresis overnight. Now on room air and will proceed with cardiac cath. Loaded with DAPT. On heparin drip and will stop in preparation for cath this AM. Statin started. Consent obtained.  Cath with occluded prox LAD that appears to be late presentation. Will plan on Cardiac MRI for viability.

## 2023-01-22 NOTE — PROGRESS NOTE ADULT - SUBJECTIVE AND OBJECTIVE BOX
REGINLAD MARK  MRN-[id number]  Patient is a 67y old  Female who presents with a chief complaint of   HPI:  67-year-old female, denies past medical history, presents to ED complaining of pleuritic midsternal chest pain w deep inspiration since yesterday associated with lightheadedness diaphoresis backaches and nausea/vomiting.  Patient has never had similar symptoms in the past.  Patient tried's (last Motrin/Tylenol with mild relief.  Patient went to urgent care today and EKG was noted to be abnormal.  Patient's daughter is NP and auscultated the heart and noticed new systolic murmur.  Patient does not have a cardiologist and a provider in many years.  Patient denies fever/chills, shortness of breath, abdominal pain, diarrhea, recent travel, recent surgeries, hemoptysis, urinary symptoms, lower extremity edema or any other symptoms at this time.    Pt found to have elevated cardiac enzymes and EKG concerning for a late presentation MI. Pt transferred to CCU for further mgmt.  (21 Jan 2023 23:27)      24hr Interval History:       Physical Exam:  Vital Signs Last 24 Hrs  T(C): 37.1 (22 Jan 2023 04:00), Max: 37.6 (21 Jan 2023 16:44)  T(F): 98.8 (22 Jan 2023 04:00), Max: 99.7 (21 Jan 2023 16:44)  HR: 81 (22 Jan 2023 06:00) (79 - 100)  BP: 100/56 (22 Jan 2023 06:00) (91/51 - 126/78)  BP(mean): 73 (22 Jan 2023 06:00) (67 - 86)  RR: 23 (22 Jan 2023 06:00) (17 - 30)  SpO2: 95% (22 Jan 2023 06:00) (95% - 100%)    Parameters below as of 22 Jan 2023 06:00  Patient On (Oxygen Delivery Method): room air        PHYSICAL EXAM:  Constitutional: Patient laying in bed, NAD  Head: Atraumatic, normocephalic  Eyes: No scleral icterus. PERRLA. EOMI  ENMT: Moist mucous membrane. Uvula midline  Neck: Supple, No JVD  Respiratory: CTA B/L. No wheezes, rales or rhonchi   Cardiovascular: S1/S2. No murmurs, rubs, or gallops.  Gastrointestinal: Nondistended, BSx4, soft, nontender.  Extremities: Moves all extremities. Warm, no edema, nontender  Vascular: 2+ DP/PT pulses B/L   Neurological: A&Ox3. Follows commands. No focal deficits.   Skin: No rashes on exposed skin     ============================I/O===========================   I&O's Detail    21 Jan 2023 07:01  -  22 Jan 2023 06:32  --------------------------------------------------------  IN:    Heparin: 70 mL  Total IN: 70 mL    OUT:    Voided (mL): 1000 mL  Total OUT: 1000 mL    Total NET: -930 mL        ============================ LABS =========================                        13.4   15.57 )-----------( 343      ( 22 Jan 2023 01:04 )             40.9     01-22    130<L>  |  90<L>  |  9   ----------------------------<  115<H>  3.9   |  26  |  0.64    Ca    9.6      22 Jan 2023 01:03  Phos  2.5     01-22  Mg     1.7     01-22    TPro  8.3  /  Alb  4.0  /  TBili  1.4<H>  /  DBili  x   /  AST  329<H>  /  ALT  90<H>  /  AlkPhos  94  01-22    LIVER FUNCTIONS - ( 22 Jan 2023 01:03 )  Alb: 4.0 g/dL / Pro: 8.3 g/dL / ALK PHOS: 94 U/L / ALT: 90 U/L / AST: 329 U/L / GGT: x           PT/INR - ( 22 Jan 2023 01:03 )   PT: 15.8 sec;   INR: 1.36 ratio         PTT - ( 22 Jan 2023 01:03 )  PTT:62.6 sec      ======================Micro/Rad/Cardio=================  Culture: Reviewed   CXR: Reviewed  Echo:Reviewed  ======================================================  PAST MEDICAL & SURGICAL HISTORY:  No pertinent past medical history        ====================ASSESSMENT ==============  63 year old w no pmh who presented w chest pain concerning for ACS    Neuro  A&Ox3  - continue to monitor mental status per protocol     Resp  Comfortable on RA  - Small B/L pleural effusions on CXR  - Monitor SpO2 with goal >94%  - s/p diuresis with lasix 40 IVP    CV  Late presentation MI  - Continue DAPT: ASA, Brilinta  - c/w  Lipitor  - CE downtrended   - Check TTE- ordered   - eventual [facility], f/u interventional cards for timing   - Concern for overload upon presentation, s/p lasix 40 IVP with good UOP, currently net neg 930mL   - Currently no further chest pain, continue to monitor  - Continue to monitor EKG  - continuous tele   - eventual GDMT w/ BB and ACE-I/ARB   - c/w heparin gtt for ACS protocol     adHF  - Given lasix for small B/l pleural effusions and inability to lay flat  - Concern for overload upon presentation, s/p lasix 40 IVP with good UOP, currently net neg 930mL   - Check TTE  - initially with lactate of 2.6 on arrival, downtrended to 2.1, continue to monitor     GI  - continue DASh diet as stevie  - Monitor for regular BM while inpatient       SCr wnl  - continue to monitor SCr, BUN, lytes, and I&Os  - replete lytes prn with goal K 4-4.5 and Mg >2     ID  Afebrile, Mild leukocytosis  - WBC slightly elevated at 14 without s/s of infection , likely reactive from ACS. Monitor off abx at this time  - monitor and trend temp curve and wbc     Heme  H/h and plts wnl   - Continue heparin gtt for ACS protocol and DVT PPX    Endo  - Check hA1C and TSH          Patient requires continuous monitoring with bedside rhythm monitoring, arterial line, pulse oximetry, ventilator monitoring and intermittent blood gas analysis.  Care plan discussed with ICU care team.  Patient remained critical; required more than usual post op care; I have spent 35 minutes providing non-routine post op care, in addition to initial critical time provided by CICU attending Dr. Sosa, re-evaluated multiple times during the day.

## 2023-01-22 NOTE — PROGRESS NOTE ADULT - SUBJECTIVE AND OBJECTIVE BOX
REGINALD MARK  MRN-[id number]  Patient is a 67y old  Female who presents with a chief complaint of chest pain    HPI: 67F denies past medical history, presents to ED complaining of pleuritic midsternal chest pain w deep inspiration since yesterday associated with lightheadedness diaphoresis backaches and nausea/vomiting.  Patient has never had similar symptoms in the past.  Patient tried's (last Motrin/Tylenol with mild relief.  Patient went to urgent care today and EKG was noted to be abnormal.  Patient's daughter is NP and auscultated the heart and noticed new systolic murmur.  Patient does not have a cardiologist and a provider in many years.  Patient denies fever/chills, shortness of breath, abdominal pain, diarrhea, recent travel, recent surgeries, hemoptysis, urinary symptoms, lower extremity edema or any other symptoms at this time.  Pt found to have elevated cardiac enzymes and EKG concerning for a late presentation MI. Pt transferred to CCU for further mgmt (21 Jan 2023 23:27)    REVIEW OF SYSTEMS:  CONSTITUTIONAL: No weakness, fevers or chills  EYES/ENT: No visual changes;  No vertigo or throat pain   NECK: No pain or stiffness  RESPIRATORY: No cough, wheezing, hemoptysis; No shortness of breath  CARDIOVASCULAR: No chest pain or palpitations  GASTROINTESTINAL: No abdominal or epigastric pain  No nausea, vomiting, or hematemesis; No diarrhea or constipation. No melena or hematochezia.  GENITOURINARY: No dysuria, frequency or hematuria  NEUROLOGICAL: No numbness or weakness  SKIN: No itching, rashes    ICU Vital Signs Last 24 Hrs  T(C): 37.1 (22 Jan 2023 16:00), Max: 37.1 (22 Jan 2023 04:00)  T(F): 98.7 (22 Jan 2023 16:00), Max: 98.8 (22 Jan 2023 04:00)  HR: 104 (22 Jan 2023 19:00) (79 - 107)  BP: 94/60 (22 Jan 2023 19:00) (11/68 - 123/71)  BP(mean): 71 (22 Jan 2023 19:00) (67 - 88)  RR: 35 (22 Jan 2023 19:00) (15 - 35)  SpO2: 95% (22 Jan 2023 19:00) (94% - 100%)    O2 Parameters below as of 22 Jan 2023 19:00  Patient On (Oxygen Delivery Method): room air      I&O's Summary    21 Jan 2023 07:01  -  22 Jan 2023 07:00  --------------------------------------------------------  IN: 192.5 mL / OUT: 1000 mL / NET: -807.5 mL    22 Jan 2023 07:01  -  22 Jan 2023 20:37  --------------------------------------------------------  IN: 587.5 mL / OUT: 300 mL / NET: 287.5 mL  ============================I/O===========================   I&O's Detail    21 Jan 2023 07:01  -  22 Jan 2023 07:00  --------------------------------------------------------  IN:    Heparin: 80 mL    IV PiggyBack: 112.5 mL  Total IN: 192.5 mL    OUT:    Voided (mL): 1000 mL  Total OUT: 1000 mL    Total NET: -807.5 mL      22 Jan 2023 07:01  -  22 Jan 2023 20:37  --------------------------------------------------------  IN:    Heparin: 40 mL    IV PiggyBack: 187.5 mL    Oral Fluid: 360 mL  Total IN: 587.5 mL    OUT:    Voided (mL): 300 mL  Total OUT: 300 mL    Total NET: 287.5 mL  ============================ LABS =========================                     13.4   15.57 )-----------( 343      ( 22 Jan 2023 01:04 )             40.9     01-22    130<L>  |  90<L>  |  9   ----------------------------<  115<H>  3.9   |  26  |  0.64    Ca    9.6      22 Jan 2023 01:03  Phos  2.5     01-22  Mg     1.7     01-22    TPro  8.3  /  Alb  4.0  /  TBili  1.4<H>  /  DBili  x   /  AST  329<H>  /  ALT  90<H>  /  AlkPhos  94  01-22    Troponin T, High Sensitivity Result: 3728 ng/L (01-22-23 @ 04:14)  Troponin T, High Sensitivity Result: 4215 ng/L (01-22-23 @ 01:03)  Troponin T, High Sensitivity Result: 3979 ng/L (01-21-23 @ 17:18)    CKMB Units: 94.2 ng/mL (01-22-23 @ 04:14)  CKMB Units: 129.0 ng/mL (01-22-23 @ 01:03)  CKMB Units: 233.1 ng/mL (01-21-23 @ 17:18)    Creatine Kinase, Serum: 2013 U/L (01-22-23 @ 04:14)  Creatine Kinase, Serum: 2366 U/L (01-22-23 @ 01:03)  Creatine Kinase, Serum: 2941 U/L (01-21-23 @ 17:18)    CPK Mass Assay %: 4.7 % (01-22-23 @ 04:14)  CPK Mass Assay %: 5.5 % (01-22-23 @ 01:03)  CPK Mass Assay %: 7.9 % (01-21-23 @ 17:18)    LIVER FUNCTIONS - ( 22 Jan 2023 01:03 )  Alb: 4.0 g/dL / Pro: 8.3 g/dL / ALK PHOS: 94 U/L / ALT: 90 U/L / AST: 329 U/L / GGT: x           PT/INR - ( 22 Jan 2023 01:03 )   PT: 15.8 sec;   INR: 1.36 ratio    PTT - ( 22 Jan 2023 06:49 )  PTT:65.0 sec    Lactate, Blood: 2.1 mmol/L (01-22-23 @ 04:14)  Lactate, Blood: 2.6 mmol/L (01-22-23 @ 01:03)  ======================Micro/Rad/Cardio=================  Telemetry: Reviewed   EKG: Reviewed  CXR: Reviewed  Culture: Reviewed   Echo: Reviewed   Cath: Reviewed   ======================================================  PAST MEDICAL & SURGICAL HISTORY:  No pertinent past medical history    A/P: 63 year old w no pmh who presented w chest pain s/p [facility]  LAD pending viability     Neuro  A&Ox3, no active issues     Resp  - no active issues, Sp02 > 94% RA    CV  Late presentation MI  - Continue DAPT: ASA, Brilinta, high intensity statin Lipitor  - CE downtrended, echo reviewed, pending viability in AM   - denies CP, euvolemic on exam, will target net even   - eventual GDMT w/ BB and ACE-I/ARB   - c/w heparin gtt for ACS protocol x 48h  - telemetry floor transfer     GI  - DASH diet      - SCr wnl, euvolemic target net even     ID  Afebrile, Mild leukocytosis likely reactive   - trend CBC, monitor off Abx    Heme  - H/H stable, c/w ACS heparin gtt x 48h    Endo  - A1C and TSH  ======================= DISPOSITION  =====================  [ ] Critical   [ ] Guarded    [X ] Stable    [ ] Maintain in CICU  [X ] Downgrade to Telemetry  [ ] Discharge Home    I have personally provided 30 minutes of critical care time excluding time spent on separate procedures, in addition to initial critical care time provided by the CICU Attending, Dr. Sheila Youssef [facility]  CICU [phone]

## 2023-01-22 NOTE — CHART NOTE - NSCHARTNOTEFT_GEN_A_CORE
Removal of Femoral Sheath    Pulses in the right lower extremity are palpable. The patient was placed in the supine position. The insertion site was identified and the sutures were removed per protocol.  The _4___ [place] femoral sheath was then removed. Direct pressure was applied for  ___15___ minutes.     Monitoring of the right groin and both lower extremities including neuro-vascular checks and vital signs every 15 minutes x 4, then every 30 minutes x 2, then every 1 hour was ordered.    Complications: None    Comments: Patient steive procedure well

## 2023-01-22 NOTE — CHART NOTE - NSCHARTNOTEFT_GEN_A_CORE
1/22 8pm: Informed by bedside RN that patient does not want to be transferred out of CICU to a telemetry floor.  I was accompanied by assistant DORI Torrez to further discuss patients concerns.  Present at bedside was patients daughter who informed me she was a Family NP within the health system.  I discussed at length with patient and her daughter that she no longer required ICU care.  She has remained HD stable with no events on telemetry, pending a viability study.  Patient expressed her concern of "feeling weak" and "not being able to get up out of bed."  However prior to my conversation, patient was found out of bed ambulating to the bathroom with no difficulty.  It was expressed in our conversation the concern of not having a private room however I stated to family all private rooms in the hospital were being utilized for isolated Covid patients.  We discuss that patient would remain monitored on telemetry while on the floor.  Patient stated she would rather leave AMA than be transferred.  After leaving patient's room, patient stated to the RN that I said I would "drag her out of the room" however this was never said.  Patient also threatened multiple times during our conversation to take legal action, stating her other daughter is a .  Administration was called to assist.  Patient/family requested to speak to Dr. Baron to make sure he was aware of her being transferred out of CICU.  Dr. Baron was called and spoke to patient/family at length that patient no longer required ICU care.  Patient is now refusing to leave until 6AM in the morning because she "does not want to be transferred in the middle of the night" although this conversation took place at 8pm. 1/22 8pm: Informed by bedside RN that patient does not want to be transferred out of CICU to a telemetry floor.  I was accompanied by assistant DORI Torrez to further discuss patients concerns.  Present at bedside was patients daughter who informed me she was a Family NP within the health system.  I discussed at length with patient and her daughter that she no longer required ICU care.  She has remained HD stable with no events on telemetry, pending a viability study, being medically managed with ACS systemic Heparin, DAPT and low dose BB.  Patient expressed her concern of "feeling weak" and "not being able to get up out of bed."  However prior to my conversation, patient was found out of bed ambulating to the bathroom with no difficulty.  It was expressed in our conversation the concern of not having a private room however I stated to family all private rooms in the hospital were being utilized for isolated Covid patients.  We discuss that patient would remain monitored on telemetry while on the floor.  Patient stated she would rather leave AMA than be transferred.  After leaving patient's room, patient stated to the RN that I said I would "drag her out of the room" however this was never said.  Patient also threatened multiple times during our conversation to take legal action, stating her other daughter is a .  Administration was called to assist.  Patient/family requested to speak to Dr. Baron to make sure he was aware of her being transferred out of CICU.  Dr. Baron was called and spoke to patient/family at length that patient no longer required ICU care.  Patient is now refusing to leave until 6AM in the morning because she "does not want to be transferred in the middle of the night" although this conversation took place at 8pm.

## 2023-01-23 DIAGNOSIS — I25.10 ATHEROSCLEROTIC HEART DISEASE OF NATIVE CORONARY ARTERY WITHOUT ANGINA PECTORIS: ICD-10-CM

## 2023-01-23 DIAGNOSIS — Z29.9 ENCOUNTER FOR PROPHYLACTIC MEASURES, UNSPECIFIED: ICD-10-CM

## 2023-01-23 DIAGNOSIS — I24.9 ACUTE ISCHEMIC HEART DISEASE, UNSPECIFIED: ICD-10-CM

## 2023-01-23 LAB
ALBUMIN SERPL ELPH-MCNC: 3.1 G/DL — LOW (ref 3.3–5)
ALP SERPL-CCNC: 145 U/L — HIGH (ref 40–120)
ALT FLD-CCNC: 226 U/L — HIGH (ref 10–45)
ANION GAP SERPL CALC-SCNC: 11 MMOL/L — SIGNIFICANT CHANGE UP (ref 5–17)
APTT BLD: 38.4 SEC — HIGH (ref 27.5–35.5)
APTT BLD: 44.9 SEC — HIGH (ref 27.5–35.5)
APTT BLD: 57.8 SEC — HIGH (ref 27.5–35.5)
AST SERPL-CCNC: 298 U/L — HIGH (ref 10–40)
BILIRUB SERPL-MCNC: 1.4 MG/DL — HIGH (ref 0.2–1.2)
BUN SERPL-MCNC: 12 MG/DL — SIGNIFICANT CHANGE UP (ref 7–23)
CALCIUM SERPL-MCNC: 9.2 MG/DL — SIGNIFICANT CHANGE UP (ref 8.4–10.5)
CHLORIDE SERPL-SCNC: 99 MMOL/L — SIGNIFICANT CHANGE UP (ref 96–108)
CHOLEST SERPL-MCNC: 159 MG/DL — SIGNIFICANT CHANGE UP
CO2 SERPL-SCNC: 26 MMOL/L — SIGNIFICANT CHANGE UP (ref 22–31)
CREAT SERPL-MCNC: 0.8 MG/DL — SIGNIFICANT CHANGE UP (ref 0.5–1.3)
EGFR: 81 ML/MIN/1.73M2 — SIGNIFICANT CHANGE UP
GLUCOSE SERPL-MCNC: 113 MG/DL — HIGH (ref 70–99)
HCT VFR BLD CALC: 38.5 % — SIGNIFICANT CHANGE UP (ref 34.5–45)
HDLC SERPL-MCNC: 51 MG/DL — SIGNIFICANT CHANGE UP
HGB BLD-MCNC: 12.5 G/DL — SIGNIFICANT CHANGE UP (ref 11.5–15.5)
INR BLD: 1.36 RATIO — HIGH (ref 0.88–1.16)
LACTATE SERPL-SCNC: 1.1 MMOL/L — SIGNIFICANT CHANGE UP (ref 0.5–2)
LIPID PNL WITH DIRECT LDL SERPL: 88 MG/DL — SIGNIFICANT CHANGE UP
MAGNESIUM SERPL-MCNC: 2.3 MG/DL — SIGNIFICANT CHANGE UP (ref 1.6–2.6)
MCHC RBC-ENTMCNC: 28.7 PG — SIGNIFICANT CHANGE UP (ref 27–34)
MCHC RBC-ENTMCNC: 32.5 GM/DL — SIGNIFICANT CHANGE UP (ref 32–36)
MCV RBC AUTO: 88.5 FL — SIGNIFICANT CHANGE UP (ref 80–100)
NON HDL CHOLESTEROL: 108 MG/DL — SIGNIFICANT CHANGE UP
NRBC # BLD: 0 /100 WBCS — SIGNIFICANT CHANGE UP (ref 0–0)
PHOSPHATE SERPL-MCNC: 2.1 MG/DL — LOW (ref 2.5–4.5)
PLATELET # BLD AUTO: 302 K/UL — SIGNIFICANT CHANGE UP (ref 150–400)
POTASSIUM SERPL-MCNC: 3.5 MMOL/L — SIGNIFICANT CHANGE UP (ref 3.5–5.3)
POTASSIUM SERPL-SCNC: 3.5 MMOL/L — SIGNIFICANT CHANGE UP (ref 3.5–5.3)
PROT SERPL-MCNC: 7.2 G/DL — SIGNIFICANT CHANGE UP (ref 6–8.3)
PROTHROM AB SERPL-ACNC: 15.8 SEC — HIGH (ref 10.5–13.4)
RBC # BLD: 4.35 M/UL — SIGNIFICANT CHANGE UP (ref 3.8–5.2)
RBC # FLD: 13.8 % — SIGNIFICANT CHANGE UP (ref 10.3–14.5)
SODIUM SERPL-SCNC: 136 MMOL/L — SIGNIFICANT CHANGE UP (ref 135–145)
TRIGL SERPL-MCNC: 101 MG/DL — SIGNIFICANT CHANGE UP
TSH SERPL-MCNC: 1.99 UIU/ML — SIGNIFICANT CHANGE UP (ref 0.27–4.2)
UFH PPP CHRO-ACNC: 0.38 IU/ML — SIGNIFICANT CHANGE UP (ref 0.3–0.7)
WBC # BLD: 13.73 K/UL — HIGH (ref 3.8–10.5)
WBC # FLD AUTO: 13.73 K/UL — HIGH (ref 3.8–10.5)

## 2023-01-23 PROCEDURE — 99223 1ST HOSP IP/OBS HIGH 75: CPT

## 2023-01-23 PROCEDURE — 99233 SBSQ HOSP IP/OBS HIGH 50: CPT | Mod: GC

## 2023-01-23 PROCEDURE — 75561 CARDIAC MRI FOR MORPH W/DYE: CPT | Mod: 26

## 2023-01-23 RX ORDER — HEPARIN SODIUM 5000 [USP'U]/ML
1250 INJECTION INTRAVENOUS; SUBCUTANEOUS
Qty: 25000 | Refills: 0 | Status: DISCONTINUED | OUTPATIENT
Start: 2023-01-23 | End: 2023-01-24

## 2023-01-23 RX ORDER — POTASSIUM CHLORIDE 20 MEQ
40 PACKET (EA) ORAL ONCE
Refills: 0 | Status: COMPLETED | OUTPATIENT
Start: 2023-01-23 | End: 2023-01-23

## 2023-01-23 RX ORDER — FUROSEMIDE 40 MG
40 TABLET ORAL DAILY
Refills: 0 | Status: DISCONTINUED | OUTPATIENT
Start: 2023-01-23 | End: 2023-01-24

## 2023-01-23 RX ORDER — POTASSIUM PHOSPHATE, MONOBASIC POTASSIUM PHOSPHATE, DIBASIC 236; 224 MG/ML; MG/ML
15 INJECTION, SOLUTION INTRAVENOUS ONCE
Refills: 0 | Status: COMPLETED | OUTPATIENT
Start: 2023-01-23 | End: 2023-01-23

## 2023-01-23 RX ADMIN — TICAGRELOR 90 MILLIGRAM(S): 90 TABLET ORAL at 05:05

## 2023-01-23 RX ADMIN — POTASSIUM PHOSPHATE, MONOBASIC POTASSIUM PHOSPHATE, DIBASIC 62.5 MILLIMOLE(S): 236; 224 INJECTION, SOLUTION INTRAVENOUS at 03:43

## 2023-01-23 RX ADMIN — HEPARIN SODIUM 12 UNIT(S)/HR: 5000 INJECTION INTRAVENOUS; SUBCUTANEOUS at 03:38

## 2023-01-23 RX ADMIN — Medication 40 MILLIGRAM(S): at 13:05

## 2023-01-23 RX ADMIN — HEPARIN SODIUM 12 UNIT(S)/HR: 5000 INJECTION INTRAVENOUS; SUBCUTANEOUS at 12:48

## 2023-01-23 RX ADMIN — Medication 40 MILLIEQUIVALENT(S): at 03:43

## 2023-01-23 RX ADMIN — Medication 81 MILLIGRAM(S): at 13:05

## 2023-01-23 RX ADMIN — ATORVASTATIN CALCIUM 80 MILLIGRAM(S): 80 TABLET, FILM COATED ORAL at 21:04

## 2023-01-23 RX ADMIN — TICAGRELOR 90 MILLIGRAM(S): 90 TABLET ORAL at 17:42

## 2023-01-23 NOTE — CHART NOTE - NSCHARTNOTEFT_GEN_A_CORE
CICU Transfer Note    Transfer from: CICU    Transfer to: Telemetry    Accepting Physician: Dr. Dov Ward  Signout given to: Dr. Ward, Medicine ACP:     HPI: [id number] denies past medical history, presents to ED complaining of pleuritic midsternal chest pain w deep inspiration since yesterday associated with lightheadedness diaphoresis backaches and nausea/vomiting.  Patient has never had similar symptoms in the past.  Patient tried's (last Motrin/Tylenol with mild relief.  Patient went to urgent care today and EKG was noted to be abnormal.  Patient's daughter is NP and auscultated the heart and noticed new systolic murmur.  Patient does not have a cardiologist and a provider in many years.  Patient denies fever/chills, shortness of breath, abdominal pain, diarrhea, recent travel, recent surgeries, hemoptysis, urinary symptoms, lower extremity edema or any other symptoms at this time.  Pt found to have elevated cardiac enzymes and EKG concerning for a late presentation MI. Pt transferred to CCU for further mgmt.  (21 Jan 2023 23:27)    CICU: s/p LHC: % ? , pending viability study.  Patient had remained HD stable being medically managed on ACS Heoprin x 48hr (to be discontinued 1/23 at 11pm), DAPT and low dose BB.  Telemetry no events, NSR    REVIEW OF SYSTEMS:  CONSTITUTIONAL: No weakness, fevers or chills  EYES/ENT: No visual changes;  No vertigo or throat pain   NECK: No pain or stiffness  RESPIRATORY: No cough, wheezing, hemoptysis; No shortness of breath  CARDIOVASCULAR: No chest pain or palpitations  GASTROINTESTINAL: No abdominal or epigastric pain  No nausea, vomiting, or hematemesis; No diarrhea or constipation. No melena or hematochezia.  GENITOURINARY: No dysuria, frequency or hematuria  NEUROLOGICAL: No numbness or weakness  SKIN: No itching, rashes    ICU Vital Signs Last 24 Hrs  T(C): 36.8 (23 Jan 2023 04:00), Max: 37.1 (22 Jan 2023 16:00)  T(F): 98.2 (23 Jan 2023 04:00), Max: 98.7 (22 Jan 2023 16:00)  HR: 81 (23 Jan 2023 05:00) (79 - 115)  BP: 82/51 (23 Jan 2023 05:00) (11/68 - 123/71)  BP(mean): 61 (23 Jan 2023 05:00) (61 - 88)  RR: 20 (23 Jan 2023 05:00) (15 - 35)  SpO2: 94% (23 Jan 2023 05:00) (92% - 99%)    O2 Parameters below as of 22 Jan 2023 19:00  Patient On (Oxygen Delivery Method): room air    I&O's Summary    21 Jan 2023 07:01  -  22 Jan 2023 07:00  --------------------------------------------------------  IN: 192.5 mL / OUT: 1000 mL / NET: -807.5 mL    22 Jan 2023 07:01  -  23 Jan 2023 05:30  --------------------------------------------------------  IN: 938.5 mL / OUT: 300 mL / NET: 638.5 mL    PHYSICAL EXAM:  GENERAL: No acute distress, well-developed  HEAD:  Atraumatic, Normocephalic  EYES: EOMI, PERRLA, conjunctiva and sclera clear  NECK: Supple, no lymphadenopathy, no JVD  CHEST/LUNG: CTAB; No wheezes, rales, or rhonchi  HEART: Regular rate and rhythm. Normal S1/S2. No murmurs, rubs, or gallops  ABDOMEN: Soft, non-tender, non-distended; normal bowel sounds, no organomegaly  EXTREMITIES:  2+ peripheral pulses b/l, No clubbing, cyanosis, or edema  NEUROLOGY: A&O x 3, no focal deficits  SKIN: No rashes or lesions  ============================I/O===========================   I&O's Detail    21 Jan 2023 07:01  -  22 Jan 2023 07:00  --------------------------------------------------------  IN:    Heparin: 80 mL    IV PiggyBack: 112.5 mL  Total IN: 192.5 mL    OUT:    Voided (mL): 1000 mL  Total OUT: 1000 mL    Total NET: -807.5 mL      22 Jan 2023 07:01  -  23 Jan 2023 05:30  --------------------------------------------------------  IN:    Heparin: 146 mL    IV PiggyBack: 312.5 mL    Oral Fluid: 480 mL  Total IN: 938.5 mL    OUT:    Voided (mL): 300 mL  Total OUT: 300 mL    Total NET: 638.5 mL  ============================ LABS =========================                  12.5   13.73 )-----------( 302      ( 23 Jan 2023 00:52 )             38.5     01-23    136  |  99  |  12  ----------------------------<  113<H>  3.5   |  26  |  0.80    Ca    9.2      23 Jan 2023 00:52  Phos  2.1     01-23  Mg     2.3     01-23    TPro  7.2  /  Alb  3.1<L>  /  TBili  1.4<H>  /  DBili  x   /  AST  298<H>  /  ALT  226<H>  /  AlkPhos  145<H>  01-23    Troponin T, High Sensitivity Result: 3728 ng/L (01-22-23 @ 04:14)  Troponin T, High Sensitivity Result: 4215 ng/L (01-22-23 @ 01:03)  Troponin T, High Sensitivity Result: 3979 ng/L (01-21-23 @ 17:18)    CKMB Units: 94.2 ng/mL (01-22-23 @ 04:14)  CKMB Units: 129.0 ng/mL (01-22-23 @ 01:03)  CKMB Units: 233.1 ng/mL (01-21-23 @ 17:18)    Creatine Kinase, Serum: 2013 U/L (01-22-23 @ 04:14)  Creatine Kinase, Serum: 2366 U/L (01-22-23 @ 01:03)  Creatine Kinase, Serum: 2941 U/L (01-21-23 @ 17:18)    CPK Mass Assay %: 4.7 % (01-22-23 @ 04:14)  CPK Mass Assay %: 5.5 % (01-22-23 @ 01:03)  CPK Mass Assay %: 7.9 % (01-21-23 @ 17:18)    LIVER FUNCTIONS - ( 23 Jan 2023 00:52 )  Alb: 3.1 g/dL / Pro: 7.2 g/dL / ALK PHOS: 145 U/L / ALT: 226 U/L / AST: 298 U/L / GGT: x           PT/INR - ( 23 Jan 2023 00:52 )   PT: 15.8 sec;   INR: 1.36 ratio    PTT - ( 23 Jan 2023 00:52 )  PTT:38.4 sec    Lactate, Blood: 1.1 mmol/L (01-23-23 @ 00:52)  Lactate, Blood: 2.1 mmol/L (01-22-23 @ 04:14)  Lactate, Blood: 2.6 mmol/L (01-22-23 @ 01:03)  ======================Micro/Rad/Cardio=================  Telemetry: Reviewed   EKG: Reviewed  CXR: Reviewed  Culture: Reviewed   Echo: Reviewed   Cath: Reviewed   ======================================================  PAST MEDICAL & SURGICAL HISTORY:  No pertinent past medical history    A/P: A/P: 63 year old w no pmh who presented w chest pain s/p [facility]  LAD pending viability     Neuro  A&Ox3, no active issues     Resp  - no active issues, Sp02 > 94% RA    CV  Late presentation MI  - Continue DAPT: ASA, Brilinta, high intensity statin Lipitor  - CE downtrended, echo reviewed, pending viability in AM   - denies CP, euvolemic on exam, will target net even   - eventual GDMT w/ BB and ACE-I/ARB   - c/w heparin gtt for ACS protocol x 48h  - telemetry floor transfer   - Cards to follow    GI  - DASH diet      - SCr wnl, euvolemic target net even     ID  Afebrile, Mild leukocytosis likely reactive   - trend CBC, monitor off Abx    Heme  - H/H stable, c/w ACS heparin gtt x 48h    Endo  - A1C and TSH  ======================= DISPOSITION  =====================  [ ] Critical   [ ] Guarded    [X ] Stable    [ ] Maintain in CICU  [X ] Downgrade to Telemetry  [ ] Discharge Home CICU Transfer Note    Transfer from: CICU    Transfer to: Telemetry    Accepting Physician: Dr. Dov Ward  Signout given to: Dr. Ward, Medicine ACP:     HPI: 67F denies past medical history, presents to ED complaining of pleuritic midsternal chest pain w deep inspiration since yesterday associated with lightheadedness diaphoresis backaches and nausea/vomiting.  Patient has never had similar symptoms in the past.  Patient tried's (last Motrin/Tylenol with mild relief.  Patient went to urgent care today and EKG was noted to be abnormal.  Patient's daughter is NP and auscultated the heart and noticed new systolic murmur.  Patient does not have a cardiologist and a provider in many years.  Patient denies fever/chills, shortness of breath, abdominal pain, diarrhea, recent travel, recent surgeries, hemoptysis, urinary symptoms, lower extremity edema or any other symptoms at this time.  Pt found to have elevated cardiac enzymes and EKG concerning for a late presentation MI. Pt transferred to CCU for further mgmt.  (21 Jan 2023 23:27)    CICU: s/p LHC: % ? , pending viability study.  Patient had remained HD stable being medically managed on ACS Heoprin x 48hr (to be discontinued 1/23 at 11pm), DAPT and low dose BB.  Telemetry no events, NSR    REVIEW OF SYSTEMS:  CONSTITUTIONAL: No weakness, fevers or chills  EYES/ENT: No visual changes;  No vertigo or throat pain   NECK: No pain or stiffness  RESPIRATORY: No cough, wheezing, hemoptysis; No shortness of breath  CARDIOVASCULAR: No chest pain or palpitations  GASTROINTESTINAL: No abdominal or epigastric pain  No nausea, vomiting, or hematemesis; No diarrhea or constipation. No melena or hematochezia.  GENITOURINARY: No dysuria, frequency or hematuria  NEUROLOGICAL: No numbness or weakness  SKIN: No itching, rashes    ICU Vital Signs Last 24 Hrs  T(C): 36.8 (23 Jan 2023 04:00), Max: 37.1 (22 Jan 2023 16:00)  T(F): 98.2 (23 Jan 2023 04:00), Max: 98.7 (22 Jan 2023 16:00)  HR: 81 (23 Jan 2023 05:00) (79 - 115)  BP: 82/51 (23 Jan 2023 05:00) (11/68 - 123/71)  BP(mean): 61 (23 Jan 2023 05:00) (61 - 88)  RR: 20 (23 Jan 2023 05:00) (15 - 35)  SpO2: 94% (23 Jan 2023 05:00) (92% - 99%)    O2 Parameters below as of 22 Jan 2023 19:00  Patient On (Oxygen Delivery Method): room air    I&O's Summary    21 Jan 2023 07:01  -  22 Jan 2023 07:00  --------------------------------------------------------  IN: 192.5 mL / OUT: 1000 mL / NET: -807.5 mL    22 Jan 2023 07:01  -  23 Jan 2023 05:30  --------------------------------------------------------  IN: 938.5 mL / OUT: 300 mL / NET: 638.5 mL    PHYSICAL EXAM:  GENERAL: No acute distress, well-developed  HEAD:  Atraumatic, Normocephalic  EYES: EOMI, PERRLA, conjunctiva and sclera clear  NECK: Supple, no lymphadenopathy, no JVD  CHEST/LUNG: CTAB; No wheezes, rales, or rhonchi  HEART: Regular rate and rhythm. Normal S1/S2. No murmurs, rubs, or gallops  ABDOMEN: Soft, non-tender, non-distended; normal bowel sounds, no organomegaly  EXTREMITIES:  2+ peripheral pulses b/l, No clubbing, cyanosis, or edema  NEUROLOGY: A&O x 3, no focal deficits  SKIN: No rashes or lesions  ============================I/O===========================   I&O's Detail    21 Jan 2023 07:01  -  22 Jan 2023 07:00  --------------------------------------------------------  IN:    Heparin: 80 mL    IV PiggyBack: 112.5 mL  Total IN: 192.5 mL    OUT:    Voided (mL): 1000 mL  Total OUT: 1000 mL    Total NET: -807.5 mL      22 Jan 2023 07:01  -  23 Jan 2023 05:30  --------------------------------------------------------  IN:    Heparin: 146 mL    IV PiggyBack: 312.5 mL    Oral Fluid: 480 mL  Total IN: 938.5 mL    OUT:    Voided (mL): 300 mL  Total OUT: 300 mL    Total NET: 638.5 mL  ============================ LABS =========================                  12.5   13.73 )-----------( 302      ( 23 Jan 2023 00:52 )             38.5     01-23    136  |  99  |  12  ----------------------------<  113<H>  3.5   |  26  |  0.80    Ca    9.2      23 Jan 2023 00:52  Phos  2.1     01-23  Mg     2.3     01-23    TPro  7.2  /  Alb  3.1<L>  /  TBili  1.4<H>  /  DBili  x   /  AST  298<H>  /  ALT  226<H>  /  AlkPhos  145<H>  01-23    Troponin T, High Sensitivity Result: 3728 ng/L (01-22-23 @ 04:14)  Troponin T, High Sensitivity Result: 4215 ng/L (01-22-23 @ 01:03)  Troponin T, High Sensitivity Result: 3979 ng/L (01-21-23 @ 17:18)    CKMB Units: 94.2 ng/mL (01-22-23 @ 04:14)  CKMB Units: 129.0 ng/mL (01-22-23 @ 01:03)  CKMB Units: 233.1 ng/mL (01-21-23 @ 17:18)    Creatine Kinase, Serum: 2013 U/L (01-22-23 @ 04:14)  Creatine Kinase, Serum: 2366 U/L (01-22-23 @ 01:03)  Creatine Kinase, Serum: 2941 U/L (01-21-23 @ 17:18)    CPK Mass Assay %: 4.7 % (01-22-23 @ 04:14)  CPK Mass Assay %: 5.5 % (01-22-23 @ 01:03)  CPK Mass Assay %: 7.9 % (01-21-23 @ 17:18)    LIVER FUNCTIONS - ( 23 Jan 2023 00:52 )  Alb: 3.1 g/dL / Pro: 7.2 g/dL / ALK PHOS: 145 U/L / ALT: 226 U/L / AST: 298 U/L / GGT: x           PT/INR - ( 23 Jan 2023 00:52 )   PT: 15.8 sec;   INR: 1.36 ratio    PTT - ( 23 Jan 2023 00:52 )  PTT:38.4 sec    Lactate, Blood: 1.1 mmol/L (01-23-23 @ 00:52)  Lactate, Blood: 2.1 mmol/L (01-22-23 @ 04:14)  Lactate, Blood: 2.6 mmol/L (01-22-23 @ 01:03)  ======================Micro/Rad/Cardio=================  Telemetry: Reviewed   EKG: Reviewed  CXR: Reviewed  Culture: Reviewed   Echo: Reviewed   Cath: Reviewed   ======================================================  PAST MEDICAL & SURGICAL HISTORY:  No pertinent past medical history    A/P: A/P: 63 year old w no pmh who presented w chest pain s/p [facility]  LAD pending viability     Neuro  A&Ox3, no active issues     Resp  - no active issues, Sp02 > 94% RA    CV  Late presentation MI  - Continue DAPT: ASA, Brilinta, high intensity statin Lipitor  - CE downtrended, echo reviewed, pending viability in AM   - denies CP, euvolemic on exam, will target net even   - eventual GDMT w/ BB and ACE-I/ARB   - c/w heparin gtt for ACS protocol x 48h  - telemetry floor transfer   - Cards to follow    GI  - DASH diet      - SCr wnl, euvolemic, Lasix 40mg PO daily to target net even     ID  Afebrile, Mild leukocytosis likely reactive   - trend CBC, monitor off Abx    Heme  - H/H stable, c/w ACS heparin gtt x 48h    Endo  - A1C and TSH  ======================= DISPOSITION  =====================  [ ] Critical   [ ] Guarded    [X ] Stable    [ ] Maintain in CICU  [X ] Downgrade to Telemetry  [ ] Discharge Home CICU Transfer Note    Transfer from: CICU    Transfer to: Telemetry    Accepting Physician: Dr. Dov Ward  Signout given to: Dr. Ward, Medicine ACP: Bella Saavedra    HPI: 67F denies past medical history, presents to ED complaining of pleuritic midsternal chest pain w deep inspiration since yesterday associated with lightheadedness diaphoresis backaches and nausea/vomiting.  Patient has never had similar symptoms in the past.  Patient tried's (last Motrin/Tylenol with mild relief.  Patient went to urgent care today and EKG was noted to be abnormal.  Patient's daughter is NP and auscultated the heart and noticed new systolic murmur.  Patient does not have a cardiologist and a provider in many years.  Patient denies fever/chills, shortness of breath, abdominal pain, diarrhea, recent travel, recent surgeries, hemoptysis, urinary symptoms, lower extremity edema or any other symptoms at this time.  Pt found to have elevated cardiac enzymes and EKG concerning for a late presentation MI. Pt transferred to CCU for further mgmt.  (21 Jan 2023 23:27)    CICU: s/p LHC: % ? , pending viability study.  Patient had remained HD stable being medically managed on ACS Heoprin x 48hr (to be discontinued 1/23 at 11pm), DAPT and low dose BB.  Telemetry no events, NSR    REVIEW OF SYSTEMS:  CONSTITUTIONAL: No weakness, fevers or chills  EYES/ENT: No visual changes;  No vertigo or throat pain   NECK: No pain or stiffness  RESPIRATORY: No cough, wheezing, hemoptysis; No shortness of breath  CARDIOVASCULAR: No chest pain or palpitations  GASTROINTESTINAL: No abdominal or epigastric pain  No nausea, vomiting, or hematemesis; No diarrhea or constipation. No melena or hematochezia.  GENITOURINARY: No dysuria, frequency or hematuria  NEUROLOGICAL: No numbness or weakness  SKIN: No itching, rashes    ICU Vital Signs Last 24 Hrs  T(C): 36.8 (23 Jan 2023 04:00), Max: 37.1 (22 Jan 2023 16:00)  T(F): 98.2 (23 Jan 2023 04:00), Max: 98.7 (22 Jan 2023 16:00)  HR: 81 (23 Jan 2023 05:00) (79 - 115)  BP: 82/51 (23 Jan 2023 05:00) (11/68 - 123/71)  BP(mean): 61 (23 Jan 2023 05:00) (61 - 88)  RR: 20 (23 Jan 2023 05:00) (15 - 35)  SpO2: 94% (23 Jan 2023 05:00) (92% - 99%)    O2 Parameters below as of 22 Jan 2023 19:00  Patient On (Oxygen Delivery Method): room air    I&O's Summary    21 Jan 2023 07:01  -  22 Jan 2023 07:00  --------------------------------------------------------  IN: 192.5 mL / OUT: 1000 mL / NET: -807.5 mL    22 Jan 2023 07:01  -  23 Jan 2023 05:30  --------------------------------------------------------  IN: 938.5 mL / OUT: 300 mL / NET: 638.5 mL    PHYSICAL EXAM:  GENERAL: No acute distress, well-developed  HEAD:  Atraumatic, Normocephalic  EYES: EOMI, PERRLA, conjunctiva and sclera clear  NECK: Supple, no lymphadenopathy, no JVD  CHEST/LUNG: CTAB; No wheezes, rales, or rhonchi  HEART: Regular rate and rhythm. Normal S1/S2. No murmurs, rubs, or gallops  ABDOMEN: Soft, non-tender, non-distended; normal bowel sounds, no organomegaly  EXTREMITIES:  2+ peripheral pulses b/l, No clubbing, cyanosis, or edema  NEUROLOGY: A&O x 3, no focal deficits  SKIN: No rashes or lesions  ============================I/O===========================   I&O's Detail    21 Jan 2023 07:01  -  22 Jan 2023 07:00  --------------------------------------------------------  IN:    Heparin: 80 mL    IV PiggyBack: 112.5 mL  Total IN: 192.5 mL    OUT:    Voided (mL): 1000 mL  Total OUT: 1000 mL    Total NET: -807.5 mL      22 Jan 2023 07:01  -  23 Jan 2023 05:30  --------------------------------------------------------  IN:    Heparin: 146 mL    IV PiggyBack: 312.5 mL    Oral Fluid: 480 mL  Total IN: 938.5 mL    OUT:    Voided (mL): 300 mL  Total OUT: 300 mL    Total NET: 638.5 mL  ============================ LABS =========================                  12.5   13.73 )-----------( 302      ( 23 Jan 2023 00:52 )             38.5     01-23    136  |  99  |  12  ----------------------------<  113<H>  3.5   |  26  |  0.80    Ca    9.2      23 Jan 2023 00:52  Phos  2.1     01-23  Mg     2.3     01-23    TPro  7.2  /  Alb  3.1<L>  /  TBili  1.4<H>  /  DBili  x   /  AST  298<H>  /  ALT  226<H>  /  AlkPhos  145<H>  01-23    Troponin T, High Sensitivity Result: 3728 ng/L (01-22-23 @ 04:14)  Troponin T, High Sensitivity Result: 4215 ng/L (01-22-23 @ 01:03)  Troponin T, High Sensitivity Result: 3979 ng/L (01-21-23 @ 17:18)    CKMB Units: 94.2 ng/mL (01-22-23 @ 04:14)  CKMB Units: 129.0 ng/mL (01-22-23 @ 01:03)  CKMB Units: 233.1 ng/mL (01-21-23 @ 17:18)    Creatine Kinase, Serum: 2013 U/L (01-22-23 @ 04:14)  Creatine Kinase, Serum: 2366 U/L (01-22-23 @ 01:03)  Creatine Kinase, Serum: 2941 U/L (01-21-23 @ 17:18)    CPK Mass Assay %: 4.7 % (01-22-23 @ 04:14)  CPK Mass Assay %: 5.5 % (01-22-23 @ 01:03)  CPK Mass Assay %: 7.9 % (01-21-23 @ 17:18)    LIVER FUNCTIONS - ( 23 Jan 2023 00:52 )  Alb: 3.1 g/dL / Pro: 7.2 g/dL / ALK PHOS: 145 U/L / ALT: 226 U/L / AST: 298 U/L / GGT: x           PT/INR - ( 23 Jan 2023 00:52 )   PT: 15.8 sec;   INR: 1.36 ratio    PTT - ( 23 Jan 2023 00:52 )  PTT:38.4 sec    Lactate, Blood: 1.1 mmol/L (01-23-23 @ 00:52)  Lactate, Blood: 2.1 mmol/L (01-22-23 @ 04:14)  Lactate, Blood: 2.6 mmol/L (01-22-23 @ 01:03)  ======================Micro/Rad/Cardio=================  Telemetry: Reviewed   EKG: Reviewed  CXR: Reviewed  Culture: Reviewed   Echo: Reviewed   Cath: Reviewed   ======================================================  PAST MEDICAL & SURGICAL HISTORY:  No pertinent past medical history    A/P: A/P: 63 year old w no pmh who presented w chest pain s/p [facility]  LAD pending viability     Neuro  A&Ox3, no active issues     Resp  - no active issues, Sp02 > 94% RA    CV  Late presentation MI  - Continue DAPT: ASA, Brilinta, high intensity statin Lipitor  - CE downtrended, echo reviewed, pending viability in AM   - denies CP, euvolemic on exam, will target net even   - eventual GDMT w/ BB and ACE-I/ARB   - c/w heparin gtt for ACS protocol x 48h  - telemetry floor transfer   - Cards to follow    GI  - DASH diet      - SCr wnl, euvolemic, Lasix 40mg PO daily to target net even     ID  Afebrile, Mild leukocytosis likely reactive   - trend CBC, monitor off Abx    Heme  - H/H stable, c/w ACS heparin gtt x 48h    Endo  - A1C and TSH  ======================= DISPOSITION  =====================  [ ] Critical   [ ] Guarded    [X ] Stable    [ ] Maintain in CICU  [X ] Downgrade to Telemetry  [ ] Discharge Home

## 2023-01-23 NOTE — PROGRESS NOTE ADULT - PROBLEM SELECTOR PLAN 1
Patient presented with chest pain and found to have late presentation of MI. [facility] with 100% stenosis of LAD and now pending results of viability study (performed today)  - Cardiology following, appreciate recommendations  - Follow up results of viability study  - Continue heparin gtts in anticipation of possible intervention, if MR with nonviable tissue will discontinue  - Continue with ASA 81 mg PO Qd and brilinta 90 mg PO BID for DAPT   - Continue with metoprolol tartrate 12.g mg PO BID  - Continue with atorvastatin 80 mg PO Qd

## 2023-01-23 NOTE — CONSULT NOTE ADULT - SUBJECTIVE AND OBJECTIVE BOX
CARDIOLOGY FELLOW CONSULT NOTE    Consulting service: CICU  Reason for consult: ACS managment    HPI:   67F denies past medical history, presents to ED complaining of pleuritic midsternal chest pain w deep inspiration since yesterday associated with lightheadedness diaphoresis backaches and nausea/vomiting.  Patient has never had similar symptoms in the past.  Patient tried's (last Motrin/Tylenol with mild relief.  Patient went to urgent care today and EKG was noted to be abnormal.  Patient's daughter is NP and auscultated the heart and noticed new systolic murmur.  Patient does not have a cardiologist and a provider in many years.  Patient denies fever/chills, shortness of breath, abdominal pain, diarrhea, recent travel, recent surgeries, hemoptysis, urinary symptoms, lower extremity edema or any other symptoms at this time.  Pt found to have elevated cardiac enzymes and EKG concerning for a late presentation MI. Pt transferred to CCU for further mgmt.  (2023 23:27)    CICU: s/p LHC: %  , pending viability study.  Patient had remained HD stable being medically managed on ACS Heparin x 48hr  DAPT and low dose BB.  Telemetry no events, NSR      PMH:   No pertinent past medical history  PSH:   FAMILY HISTORY:  SH    Allergies:  No Known Allergies      Home Meds:    Current Medications:   aspirin  chewable 81 milliGRAM(s) Oral daily  atorvastatin 80 milliGRAM(s) Oral at bedtime  furosemide    Tablet 40 milliGRAM(s) Oral daily  heparin  Infusion 1000 Unit(s)/Hr IV Continuous <Continuous>  influenza  Vaccine (HIGH DOSE) 0.7 milliLiter(s) IntraMuscular once  metoprolol tartrate 12.5 milliGRAM(s) Oral two times a day  ticagrelor 90 milliGRAM(s) Oral every 12 hours        REVIEW OF SYSTEMS:  CONSTITUTIONAL: No weakness, fevers or chills  EYES/ENT: No visual changes;  No dysphagia  NECK: No pain or stiffness  RESPIRATORY: No cough, wheezing, hemoptysis; No shortness of breath  CARDIOVASCULAR: No chest pain or palpitations; No lower extremity edema  GASTROINTESTINAL: No abdominal or epigastric pain. No nausea, vomiting, or hematemesis; No diarrhea or constipation. No melena or hematochezia.  BACK: No back pain  GENITOURINARY: No dysuria, frequency or hematuria  NEUROLOGICAL: No numbness or weakness  SKIN: No itching, burning, rashes, or lesions   All other review of systems is negative unless indicated above.    Physical Exam:  T(F): 98.2 (), Max: 98.7 ()  HR: 96 () (81 - 115)  BP: 99/74 () (11/68 - 113/72)  RR: 16 ()  SpO2: 98% ()  GENERAL: No acute distress, well-developed  HEAD:  Atraumatic, Normocephalic  ENT: EOMI, PERRLA, conjunctiva and sclera clear, Neck supple, No JVD, moist mucosa  CHEST/LUNG: Clear to auscultation bilaterally; No wheeze, equal breath sounds bilaterally   BACK: No spinal tenderness  HEART: Regular rate and rhythm; No murmurs, rubs, or gallops  ABDOMEN: Soft, Nontender, Nondistended; Bowel sounds present  EXTREMITIES:  No clubbing, cyanosis, or edema  PSYCH: Nl behavior, nl affect  NEUROLOGY: AAOx3, non-focal, cranial nerves intact  SKIN: Normal color, No rashes or lesions  LINES:    ECG: Personally reviewed    Echo: Personally reviewed    Stress Testing: Personally reviewed    Cath: Personally reviewed    CXR: Personally reviewed    Labs: Personally reviewed                        12.5   13.73 )-----------( 302      ( 2023 00:52 )             38.5         136  |  99  |  12  ----------------------------<  113<H>  3.5   |  26  |  0.80    Ca    9.2      2023 00:52  Phos  2.1       Mg     2.3         TPro  7.2  /  Alb  3.1<L>  /  TBili  1.4<H>  /  DBili  x   /  AST  298<H>  /  ALT  226<H>  /  AlkPhos  145<H>      PT/INR - ( 2023 00:52 )   PT: 15.8 sec;   INR: 1.36 ratio         PTT - ( 2023 10:26 )  PTT:44.9 sec    CARDIAC MARKERS ( 2023 04:14 )  3728 ng/L / x     / x     / 2013 U/L / x     / 94.2 ng/mL  CARDIAC MARKERS ( 2023 01:03 )  4215 ng/L / x     / x     / 2366 U/L / x     / 129.0 ng/mL  CARDIAC MARKERS ( 2023 17:18 )  3979 ng/L / x     / x     / 2941 U/L / x     / 233.1 ng/mL        Serum Pro-Brain Natriuretic Peptide: 2945 pg/mL ( @ 17:18)    Total Cholesterol: 159  LDL: --  HDL: 51  T      Thyroid Stimulating Hormone, Serum: 1.99 uIU/mL ( @ 00:52)  Thyroid Stimulating Hormone, Serum: 0.49 uIU/mL ( @ 01:03)      Signed by:  Miky Rivera PGY4  Cardiology Fellow   CARDIOLOGY FELLOW CONSULT NOTE    Consulting service: CICU  Reason for consult: ACS managment    HPI:   67F with no known past medical history, presented to ED complaining of pleuritic midsternal chest pain w deep inspiration  associated with lightheadedness diaphoresis backaches and nausea/vomiting.  Patient has never had similar symptoms in the past.   Patient does not have a cardiologist and a provider in many years.  Patient denies fever/chills, shortness of breath, abdominal pain, diarrhea, recent travel, recent surgeries, hemoptysis, urinary symptoms, lower extremity edema or any other symptoms at this time.  Pt found to have elevated cardiac enzymes and EKG concerning for a late presentation MI. Pt transferred to CCU for further mgmt.     CICU: s/p LHC: %  , pending viability study.  Patient had remained HD stable being medically managed on ACS Heparin x 48hr  DAPT and low dose BB.  Telemetry no events, NSR      PMH:   No pertinent past medical history  PSH:   FAMILY HISTORY:  SH  Allergies:  No Known Allergies      Home Meds:    Current Medications:   aspirin  chewable 81 milliGRAM(s) Oral daily  atorvastatin 80 milliGRAM(s) Oral at bedtime  furosemide    Tablet 40 milliGRAM(s) Oral daily  heparin  Infusion 1000 Unit(s)/Hr IV Continuous <Continuous>  influenza  Vaccine (HIGH DOSE) 0.7 milliLiter(s) IntraMuscular once  metoprolol tartrate 12.5 milliGRAM(s) Oral two times a day  ticagrelor 90 milliGRAM(s) Oral every 12 hours        REVIEW OF SYSTEMS:  CONSTITUTIONAL: No weakness, fevers or chills  EYES/ENT: No visual changes;  No dysphagia  NECK: No pain or stiffness  RESPIRATORY: No cough, wheezing, hemoptysis; No shortness of breath  CARDIOVASCULAR: No chest pain or palpitations; No lower extremity edema  GASTROINTESTINAL: No abdominal or epigastric pain. No nausea, vomiting, or hematemesis; No diarrhea or constipation. No melena or hematochezia.  BACK: No back pain  GENITOURINARY: No dysuria, frequency or hematuria  NEUROLOGICAL: No numbness or weakness  SKIN: No itching, burning, rashes, or lesions   All other review of systems is negative unless indicated above.    Physical Exam:  T(F): 98.2 (-), Max: 98.7 ()  HR: 96 () (81 - 115)  BP: 99/74 () (11/68 - 113/72)  RR: 16 ()  SpO2: 98% ()  PHYSICAL EXAM:  Constitutional: Patient laying in bed, NAD  Head: Atraumatic, normocephalic  Eyes: No scleral icterus. PERRLA. EOMI  ENMT: Moist mucous membrane. Uvula midline  Neck: Supple, No JVD  Respiratory: CTA B/L. No wheezes, rales or rhonchi   Cardiovascular: S1/S2. No murmurs, rubs, or gallops.  Gastrointestinal: Nondistended, BSx4, soft, nontender.  Extremities: Moves all extremities. Warm, no edema, nontender  Vascular: 2+ DP/PT pulses B/L   Neurological: A&Ox3. Follows commands. No focal deficits.   Skin: No rashes on exposed skin   ECG: Personally reviewed    Echo: Personally reviewed    Stress Testing: Personally reviewed    Cath: Personally reviewed    CXR: Personally reviewed    Labs: Personally reviewed                        12.5   13.73 )-----------( 302      ( 2023 00:52 )             38.5         136  |  99  |  12  ----------------------------<  113<H>  3.5   |  26  |  0.80    Ca    9.2      2023 00:52  Phos  2.1       Mg     2.3         TPro  7.2  /  Alb  3.1<L>  /  TBili  1.4<H>  /  DBili  x   /  AST  298<H>  /  ALT  226<H>  /  AlkPhos  145<H>      PT/INR - ( 2023 00:52 )   PT: 15.8 sec;   INR: 1.36 ratio         PTT - ( 2023 10:26 )  PTT:44.9 sec    CARDIAC MARKERS ( 2023 04:14 )  3728 ng/L / x     / x     / 2013 U/L / x     / 94.2 ng/mL  CARDIAC MARKERS ( 2023 01:03 )  4215 ng/L / x     / x     / 2366 U/L / x     / 129.0 ng/mL  CARDIAC MARKERS ( 2023 17:18 )  3979 ng/L / x     / x     / 2941 U/L / x     / 233.1 ng/mL        Serum Pro-Brain Natriuretic Peptide: 2945 pg/mL ( @ 17:18)    Total Cholesterol: 159  LDL: --  HDL: 51  T      Thyroid Stimulating Hormone, Serum: 1.99 uIU/mL ( @ 00:52)  Thyroid Stimulating Hormone, Serum: 0.49 uIU/mL ( @ 01:03)         CARDIOLOGY FELLOW CONSULT NOTE    Consulting service: CICU  Reason for consult: ACS managment    HPI:  67 F with no known past medical history, presented to ED complaining of pleuritic midsternal chest pain w deep inspiration, associated with lightheadedness, diaphoresis, backache and nausea/vomiting.  Patient never had similar symptoms in the past.     Pt found to have elevated cardiac enzymes and EKG concerning for a late presentation MI. Pt admitted to CICU for further mgmt.  W/U in CICU included LHC: % c/w .  Viability study planned.  Patient has remained HD stable, being medically managed on ACS protocol: Heparin x 48hr, DAPT and low dose BB.        Allergies:  No Known Allergies      Current Medications:   aspirin  chewable 81 milliGRAM(s) Oral daily  atorvastatin 80 milliGRAM(s) Oral at bedtime  furosemide    Tablet 40 milliGRAM(s) Oral daily  heparin  Infusion 1000 Unit(s)/Hr IV Continuous <Continuous>  influenza  Vaccine (HIGH DOSE) 0.7 milliLiter(s) IntraMuscular once  metoprolol tartrate 12.5 milliGRAM(s) Oral two times a day  ticagrelor 90 milliGRAM(s) Oral every 12 hours        REVIEW OF SYSTEMS:  CONSTITUTIONAL: No weakness, fevers or chills  EYES/ENT: No visual changes;  No dysphagia  NECK: No pain or stiffness  RESPIRATORY: No cough, wheezing, hemoptysis; No shortness of breath  CARDIOVASCULAR: No chest pain or palpitations; No lower extremity edema  GASTROINTESTINAL: No abdominal or epigastric pain. No nausea, vomiting, or hematemesis; No diarrhea or constipation. No melena or hematochezia.  BACK: No back pain  GENITOURINARY: No dysuria, frequency or hematuria  NEUROLOGICAL: No numbness or weakness  SKIN: No itching, burning, rashes, or lesions   All other review of systems is negative unless indicated above.    Physical Exam:  T(F): 98.2 (-), Max: 98.7 ()  HR: 96 () (81 - 115)  BP: 99/74 () (11/ - 113/72)  RR: 16 (-)  SpO2: 98% ()  PHYSICAL EXAM:    Constitutional: Patient laying in bed, NAD  Head: Atraumatic, normocephalic  Eyes: No scleral icterus. PERRLA. EOMI  ENMT: Moist mucous membrane. Uvula midline  Neck: Supple, No JVD  Respiratory: CTA B/L. No wheezes, rales or rhonchi   Cardiovascular: S1/S2. No murmurs, rubs, or gallops.  Gastrointestinal: Nondistended, BSx4, soft, nontender.  Extremities: Moves all extremities. Warm, no edema, nontender  Vascular: 2+ DP/PT pulses B/L   Neurological: A&Ox3. Follows commands. No focal deficits.   Skin: No rashes on exposed skin       Telemetry no events, NSR          Labs:                       12.5   13.73 )-----------( 302      ( 2023 00:52 )             38.5         136  |  99  |  12  ----------------------------<  113<H>  3.5   |  26  |  0.80    Ca    9.2      2023 00:52  Phos  2.1       Mg     2.3         TPro  7.2  /  Alb  3.1<L>  /  TBili  1.4<H>  /  DBili  x   /  AST  298<H>  /  ALT  226<H>  /  AlkPhos  145<H>      PT/INR - ( 2023 00:52 )   PT: 15.8 sec;   INR: 1.36 ratio    PTT - ( 2023 10:26 )  PTT:44.9 sec      CARDIAC MARKERS ( 2023 04:14 )  3728 ng/L / x     / x     / 2013 U/L / x     / 94.2 ng/mL  CARDIAC MARKERS ( 2023 01:03 )  4215 ng/L / x     / x     / 2366 U/L / x     / 129.0 ng/mL  CARDIAC MARKERS ( 2023 17:18 )  3979 ng/L / x     / x     / 2941 U/L / x     / 233.1 ng/mL      Serum Pro-Brain Natriuretic Peptide: 2945 pg/mL ( @ 17:18)    Total Cholesterol: 159  LDL: --  HDL: 51  T      Thyroid Stimulating Hormone, Serum: 1.99 uIU/mL ( @ 00:52)  Thyroid Stimulating Hormone, Serum: 0.49 uIU/mL ( @ 01:03)        TTE with Doppler (w/Cont) (23 @ 07:20)     Patient name: REGINALD MARK  YOB: 1955   Age: 67 (F)   MR#: [id number]  Study Date: 2023  Location: O/PSonographer: AKI Cueto  Study quality: Technically good  ------------------------------------------------------------------------  Dimensions:    Normal Values:  LA:     3.9    2.0 - 4.0 cm  Ao:     2.7    2.0 - 3.8 cm  SEPTUM: 0.6    0.6 - 1.2 cm  PWT:    0.9    0.6 - 1.1 cm  LVIDd:  4.9    3.0 - 5.6 cm  LVIDs:  4.0    1.8 - 4.0 cm  Derived variables:  LVMI: 67 g/m2  RWT: 0.36  Fractional short: 18 %  EF ([place]cal[place] Rule): 41 %Doppler Peak Velocity (m/sec):  AoV=1.4  ------------------------------------------------------------------------  Conclusions:  1. Mild mitral annular calcification. Tethered mitral valve leaflets with normal opening. Mild mitral regurgitation.  2. Calcified trileaflet aortic valve with normal opening. Minimal aortic regurgitation.  3. Endocardial visualization enhanced with intravenous injection of Ultrasonic Enhancing Agent (Definity). Moderate segmental left ventricular systolic dysfunction. The distal septum, distal anterolateral, distal inferior, mid to distal anterior, distal inferolateral, basal to mid anteroseptal, and the apex are akinetic. 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 No left ventricular thrombus.  4. Normal right ventricular size and function.  Results discussed with CICU fellow.  *** No previous Echo exam.  ------------------------------------------------------------------------  Confirmed on  2023 - 12:21:47 by Konrad Fortune M.D.        Cardiac Catheterization (23 @ 11:02)   Study Date:     2023   Name:           REGINALD MARK   :            1955   (67 years)   Gender:         female     Cath Lab Report    Diagnostic Cardiologist:       Bradley Baron MD   Fellow:                        Tami Franklin MD   Referring Physician:           Elvira Sosa MD     Procedures Performed   Procedures:                 1.    Ultrasound Guided Access   2.    Arterial Access - Right Femoral   3.    Diagnostic Coronary Angiography   4.    Left Heart Cath     Indications:                Late presentation STEMI     Diagnostic Conclusions:   Proximal LAD occlusion. Moderate atherosclerosis in mid Cx and OM2.     Recommendations:   Patient with late presentation anterior STEMI. Currently asymptomatic (denies chest pain or SOB). Optimize medical therapy for ischemic cardiomyopathy. Consider viability testing to guide further management of occluded LAD.      Diagnostic Findings:     Coronary Angiography   The coronary circulation is right dominant. Cardiac catheterization was performed urgently.    LM   Left main artery: Normal. Short.      LAD   Proximal left anterior descending: The distal vessel is supplied by faint collaterals from the Right posterior descending artery.  There is a 100 % stenosis. MARTHA Flow 0.      CX   Mid circumflex: There is a 50 % stenosis in the middle third portion of the segment after branch to OM1. MARTHA Flow 3. Second obtuse marginal: There is a 40 % stenosis in the proximal third portion of the segment.    RCA   Mid right coronary artery: There is a 20 % stenosis in the middle third portion of the segment.    Left Heart Cath   Left ventricular function was not assessed.

## 2023-01-23 NOTE — CONSULT NOTE ADULT - ASSESSMENT
67F with no known past medical history, presented with new onset chest pain, s/p LHC  LAD; now status post CICU care for high risk ACS.  Patient had remained HD stable being medically managed on ACS Heparin DAPT and low dose BB.  Telemetry no events, NSR    1. ACS  - pending viability study today; s/p TTE on 1/22/23- Mild mitral annular calcification. Tethered mitral valve  leaflets with normal opening. Mild mitral regurgitation. Calcified trileaflet aortic valve with normal opening.  Minimal aortic regurgitation. Endocardial visualization enhanced with intravenous injection of Ultrasonic Enhancing Agent   Moderate segmental left ventricular systolic dysfunction.The distal septum, distal anterolateral, distal inferior, mid to distal anterior, distal inferolateral, basal to mid anteroseptal, and the apex are akinetic. 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 No left ventricular thrombus.Normal right ventricular size and function.  - will assess need for PCI indication after viability study   - c/w Brillinta, hep gtt, and ASA  - lasix 40mg QD  - lopressor 12.5mg BID  - lipitor 80mg QD      Plan discussed with and approved by attending on call, Dr. Natalia Herzog MD PGY-2 66 yo F with no known past medical history, presented with new onset chest pain.  Cath revealed  LAD; now s/p CICU care for acute MI.  Patient remains HD stable, being medically managed on heparin, DAPT and low dose BB.    Telemetry no events, NSR      REC:  1. STEMI  - pending viability study today  - s/p TTE on 1/22/23- Mild mitral annular calcification. Tethered mitral valve leaflets with normal opening. Mild mitral regurgitation. Calcified trileaflet aortic valve with normal opening. Minimal aortic regurgitation. Endocardial visualization enhanced with intravenous injection of Ultrasonic Enhancing Agent  Moderate segmental left ventricular systolic dysfunction.The distal septum, distal anterolateral, distal inferior, mid to distal anterior, distal inferolateral, basal to mid anteroseptal, and the apex are akinetic. 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 No left ventricular thrombus. Normal right ventricular size and function.  - will re-assess need for PCI indication after viability study   - c/w Brilinta, hep gtt, and ASA  - Lasix 40mg QD  - Lopressor 12.5mg BID  - Lipitor 80mg QD      Donna Herzog MD PGY-2  Cardiology resident    Plan discussed with cardiology fellow and resident.  Patient seen and examined.  Hx., exam and labs as above.  I agree with the assessment and recommendations, which I have reviewed and edited where appropriate.  Ramon Fisher M.D.  Cardiology Attending, Consult Service    For Cardiology consults and questions, all Cardiology service information can be found 24/7 on [email or web address] - use password: [email or web address] to log in.

## 2023-01-23 NOTE — PROGRESS NOTE ADULT - SUBJECTIVE AND OBJECTIVE BOX
[facility] Division of Hospital Medicine  Yimi Caballero MD  Available via MS Teams  Pager [phone]    HPI: 67F with no known past medical history, presented to the ED with substernal chest pain, worse with deep inspiration, with associated lightheadedness. Patient took Tylenol and NSAIDs without improvement in her pain. Patient went to urgent care and found to have abnormal EKG, and was then sent to the emergency room for further evaluation. In ED, cardiology consulted, and patient found to have late presentation of MI. Subsequently taken for cardiac catheterization, and found to have 100% LAD stenosis, with no interventions taken. Patient transferred from CICU to medicine service today for further treatment with cardiology following. Cardiac MRI performed today for viability study.     ROS: At time of exam, patient is resting comfortably and in no acute distress. Denies chest pain or palpitations. Denies shortness of breath or cough. Denies abdominal pain, nausea or vomiting. Denies syncope or near syncope.    PMH: No known history  PSH: None  Social: Denies toxic habits, has children     MEDICATIONS  (STANDING):  aspirin  chewable 81 milliGRAM(s) Oral daily  atorvastatin 80 milliGRAM(s) Oral at bedtime  furosemide    Tablet 40 milliGRAM(s) Oral daily  heparin  Infusion 1250 Unit(s)/Hr (12.5 mL/Hr) IV Continuous <Continuous>  influenza  Vaccine (HIGH DOSE) 0.7 milliLiter(s) IntraMuscular once  metoprolol tartrate 12.5 milliGRAM(s) Oral two times a day  ticagrelor 90 milliGRAM(s) Oral every 12 hours    MEDICATIONS  (PRN):      I&O's Summary    22 Jan 2023 07:01  -  23 Jan 2023 07:00  --------------------------------------------------------  IN: 938.5 mL / OUT: 300 mL / NET: 638.5 mL    23 Jan 2023 07:01  -  23 Jan 2023 13:03  --------------------------------------------------------  IN: 240 mL / OUT: 0 mL / NET: 240 mL        PHYSICAL EXAM:  Vital Signs Last 24 Hrs  T(C): 36.8 (23 Jan 2023 06:41), Max: 37.1 (22 Jan 2023 16:00)  T(F): 98.2 (23 Jan 2023 06:41), Max: 98.7 (22 Jan 2023 16:00)  HR: 96 (23 Jan 2023 06:41) (81 - 115)  BP: 99/74 (23 Jan 2023 06:41) (81/51 - 113/72)  BP(mean): 71 (23 Jan 2023 06:00) (61 - 87)  RR: 16 (23 Jan 2023 06:41) (16 - 35)  SpO2: 98% (23 Jan 2023 06:41) (92% - 98%)    Parameters below as of 23 Jan 2023 06:41  Patient On (Oxygen Delivery Method): room air      CONSTITUTIONAL: NAD, well-groomed  EYES: PERRLA; conjunctiva and sclera clear  ENMT: Moist oral mucosa, no pharyngeal injection or exudates; normal dentition  NECK: Supple, no palpable masses; no thyromegaly  RESPIRATORY: Normal respiratory effort; lungs are clear to auscultation bilaterally  CARDIOVASCULAR: normal S1 and S2, no murmur/rub/gallop; No lower extremity edema  ABDOMEN: Nontender to palpation, normoactive bowel sounds, no rebound/guarding; No hepatosplenomegaly  MUSCULOSKELETAL:  no clubbing or cyanosis of digits; no joint swelling or tenderness to palpation  PSYCH: A+O to person, place, and time; affect appropriate  NEUROLOGY: CN 2-12 are intact and symmetric; no gross sensory deficits   SKIN: No rashes; no palpable lesions    LABS:                        12.5   13.73 )-----------( 302      ( 23 Jan 2023 00:52 )             38.5     01-23    136  |  99  |  12  ----------------------------<  113<H>  3.5   |  26  |  0.80    Ca    9.2      23 Jan 2023 00:52  Phos  2.1     01-23  Mg     2.3     01-23    TPro  7.2  /  Alb  3.1<L>  /  TBili  1.4<H>  /  DBili  x   /  AST  298<H>  /  ALT  226<H>  /  AlkPhos  145<H>  01-23    PT/INR - ( 23 Jan 2023 00:52 )   PT: 15.8 sec;   INR: 1.36 ratio         PTT - ( 23 Jan 2023 10:26 )  PTT:44.9 sec  CARDIAC MARKERS ( 22 Jan 2023 04:14 )  x     / x     / 2013 U/L / x     / 94.2 ng/mL  CARDIAC MARKERS ( 22 Jan 2023 01:03 )  x     / x     / 2366 U/L / x     / 129.0 ng/mL  CARDIAC MARKERS ( 21 Jan 2023 17:18 )  x     / x     / 2941 U/L / x     / 233.1 ng/mL    COMMUNICATION:  Care Discussed with Consultants/Other Providers and Details of Discussion: Case discussed with ACP  Discussions with Patient/Family: Case discussed with patient, daughter at bedside, daughter on phone at time of exam. Pending cardiac MR results

## 2023-01-24 LAB
ALBUMIN SERPL ELPH-MCNC: 2.6 G/DL — LOW (ref 3.3–5)
ALP SERPL-CCNC: 172 U/L — HIGH (ref 40–120)
ALT FLD-CCNC: 160 U/L — HIGH (ref 10–45)
ANION GAP SERPL CALC-SCNC: 10 MMOL/L — SIGNIFICANT CHANGE UP (ref 5–17)
APTT BLD: 68.5 SEC — HIGH (ref 27.5–35.5)
APTT BLD: 71.4 SEC — HIGH (ref 27.5–35.5)
AST SERPL-CCNC: 122 U/L — HIGH (ref 10–40)
BILIRUB SERPL-MCNC: 1.2 MG/DL — SIGNIFICANT CHANGE UP (ref 0.2–1.2)
BUN SERPL-MCNC: 12 MG/DL — SIGNIFICANT CHANGE UP (ref 7–23)
CALCIUM SERPL-MCNC: 8.6 MG/DL — SIGNIFICANT CHANGE UP (ref 8.4–10.5)
CHLORIDE SERPL-SCNC: 103 MMOL/L — SIGNIFICANT CHANGE UP (ref 96–108)
CO2 SERPL-SCNC: 23 MMOL/L — SIGNIFICANT CHANGE UP (ref 22–31)
CREAT SERPL-MCNC: 0.7 MG/DL — SIGNIFICANT CHANGE UP (ref 0.5–1.3)
EGFR: 95 ML/MIN/1.73M2 — SIGNIFICANT CHANGE UP
GLUCOSE SERPL-MCNC: 107 MG/DL — HIGH (ref 70–99)
HCT VFR BLD CALC: 33.3 % — LOW (ref 34.5–45)
HGB BLD-MCNC: 10.8 G/DL — LOW (ref 11.5–15.5)
MAGNESIUM SERPL-MCNC: 2 MG/DL — SIGNIFICANT CHANGE UP (ref 1.6–2.6)
MCHC RBC-ENTMCNC: 28.9 PG — SIGNIFICANT CHANGE UP (ref 27–34)
MCHC RBC-ENTMCNC: 32.4 GM/DL — SIGNIFICANT CHANGE UP (ref 32–36)
MCV RBC AUTO: 89 FL — SIGNIFICANT CHANGE UP (ref 80–100)
NRBC # BLD: 0 /100 WBCS — SIGNIFICANT CHANGE UP (ref 0–0)
PHOSPHATE SERPL-MCNC: 2.8 MG/DL — SIGNIFICANT CHANGE UP (ref 2.5–4.5)
PLATELET # BLD AUTO: 250 K/UL — SIGNIFICANT CHANGE UP (ref 150–400)
POTASSIUM SERPL-MCNC: 3.5 MMOL/L — SIGNIFICANT CHANGE UP (ref 3.5–5.3)
POTASSIUM SERPL-SCNC: 3.5 MMOL/L — SIGNIFICANT CHANGE UP (ref 3.5–5.3)
PROT SERPL-MCNC: 6.5 G/DL — SIGNIFICANT CHANGE UP (ref 6–8.3)
RBC # BLD: 3.74 M/UL — LOW (ref 3.8–5.2)
RBC # FLD: 14 % — SIGNIFICANT CHANGE UP (ref 10.3–14.5)
SODIUM SERPL-SCNC: 136 MMOL/L — SIGNIFICANT CHANGE UP (ref 135–145)
WBC # BLD: 10.44 K/UL — SIGNIFICANT CHANGE UP (ref 3.8–10.5)
WBC # FLD AUTO: 10.44 K/UL — SIGNIFICANT CHANGE UP (ref 3.8–10.5)

## 2023-01-24 PROCEDURE — 93010 ELECTROCARDIOGRAM REPORT: CPT

## 2023-01-24 PROCEDURE — 99233 SBSQ HOSP IP/OBS HIGH 50: CPT

## 2023-01-24 RX ORDER — LOSARTAN POTASSIUM 100 MG/1
12.5 TABLET, FILM COATED ORAL AT BEDTIME
Refills: 0 | Status: DISCONTINUED | OUTPATIENT
Start: 2023-01-24 | End: 2023-01-25

## 2023-01-24 RX ORDER — DAPAGLIFLOZIN 10 MG/1
10 TABLET, FILM COATED ORAL EVERY 24 HOURS
Refills: 0 | Status: DISCONTINUED | OUTPATIENT
Start: 2023-01-24 | End: 2023-01-25

## 2023-01-24 RX ORDER — TICAGRELOR 90 MG/1
1 TABLET ORAL
Qty: 60 | Refills: 0
Start: 2023-01-24 | End: 2023-02-22

## 2023-01-24 RX ORDER — ENOXAPARIN SODIUM 100 MG/ML
40 INJECTION SUBCUTANEOUS EVERY 24 HOURS
Refills: 0 | Status: DISCONTINUED | OUTPATIENT
Start: 2023-01-25 | End: 2023-01-27

## 2023-01-24 RX ADMIN — TICAGRELOR 90 MILLIGRAM(S): 90 TABLET ORAL at 17:04

## 2023-01-24 RX ADMIN — HEPARIN SODIUM 13 UNIT(S)/HR: 5000 INJECTION INTRAVENOUS; SUBCUTANEOUS at 03:04

## 2023-01-24 RX ADMIN — TICAGRELOR 90 MILLIGRAM(S): 90 TABLET ORAL at 05:22

## 2023-01-24 RX ADMIN — Medication 12.5 MILLIGRAM(S): at 11:06

## 2023-01-24 RX ADMIN — ATORVASTATIN CALCIUM 80 MILLIGRAM(S): 80 TABLET, FILM COATED ORAL at 22:17

## 2023-01-24 RX ADMIN — Medication 81 MILLIGRAM(S): at 11:06

## 2023-01-24 NOTE — PROGRESS NOTE ADULT - SUBJECTIVE AND OBJECTIVE BOX
Soniya Pool MD  Division of Hospital Medicine  Available via MS Teams  Pager [phone], If no response/off hours [phone]    Patient is a 67y old  Female who presents with a chief complaint of Chest pain (23 Jan 2023 13:00)        SUBJECTIVE / OVERNIGHT EVENTS:  overnight no events  no n/v/f/chills, cp, abd pain  feels sob has improved  seen w/ daughter at bedside      I&O's Summary    23 Jan 2023 07:01  -  24 Jan 2023 07:00  --------------------------------------------------------  IN: 983 mL / OUT: 0 mL / NET: 983 mL    24 Jan 2023 07:01  -  24 Jan 2023 14:38  --------------------------------------------------------  IN: 600 mL / OUT: 0 mL / NET: 600 mL      Vital Signs Last 24 Hrs  T(C): 36.5 (24 Jan 2023 13:44), Max: 36.7 (23 Jan 2023 17:38)  T(F): 97.7 (24 Jan 2023 13:44), Max: 98.1 (23 Jan 2023 19:58)  HR: 92 (24 Jan 2023 13:44) (85 - 101)  BP: 93/60 (24 Jan 2023 13:44) (89/58 - 98/63)  BP(mean): --  RR: 17 (24 Jan 2023 13:44) (16 - 18)  SpO2: 98% (24 Jan 2023 13:44) (96% - 98%)    Parameters below as of 24 Jan 2023 13:44  Patient On (Oxygen Delivery Method): room air        PHYSICAL EXAM:  GENERAL:  Well appearing, in NAD  HEAD:  NCAT  EYES: PERRLA, conjunctiva clear  NECK: Supple, No JVD  CHEST/LUNG: CTA B/L. No w/r/r.  HEART: Reg rate. Normal S1, S2. No m/r/g.   ABDOMEN: SNTND.   EXTREMITIES:  2+ Peripheral Pulses, No clubbing, cyanosis, edema.  PSYCH: AAOx3, appropriate affect  SKIN: healing ecchymoses of left calf    LABS:                        10.8   10.44 )-----------( 250      ( 24 Jan 2023 06:04 )             33.3     01-24    136  |  103  |  12  ----------------------------<  107<H>  3.5   |  23  |  0.70    Ca    8.6      24 Jan 2023 06:04  Phos  2.8     01-24  Mg     2.0     01-24    TPro  6.5  /  Alb  2.6<L>  /  TBili  1.2  /  DBili  x   /  AST  122<H>  /  ALT  160<H>  /  AlkPhos  172<H>  01-24    PT/INR - ( 23 Jan 2023 00:52 )   PT: 15.8 sec;   INR: 1.36 ratio         PTT - ( 24 Jan 2023 11:31 )  PTT:71.4 sec              CAPILLARY BLOOD GLUCOSE        MEDICATIONS  (STANDING):  aspirin  chewable 81 milliGRAM(s) Oral daily  atorvastatin 80 milliGRAM(s) Oral at bedtime  dapagliflozin 10 milliGRAM(s) Oral every 24 hours  heparin  Infusion 1250 Unit(s)/Hr (13 mL/Hr) IV Continuous <Continuous>  influenza  Vaccine (HIGH DOSE) 0.7 milliLiter(s) IntraMuscular once  losartan 12.5 milliGRAM(s) Oral at bedtime  metoprolol tartrate 12.5 milliGRAM(s) Oral two times a day  ticagrelor 90 milliGRAM(s) Oral every 12 hours    MEDICATIONS  (PRN):

## 2023-01-24 NOTE — PROGRESS NOTE ADULT - SUBJECTIVE AND OBJECTIVE BOX
Cardiology Progress Note  ------------------------------------------------------------------------------------------  SUBJECTIVE:   No events overnight. Denies CP, SOB or Palpitations.   -------------------------------------------------------------------------------------------  ROS:  CV: chest pain (-), palpitation (-), orthopnea (-), PND (-), edema (-)  PULM: SOB (-), cough (-), wheezing (-), hemoptysis (-).   CONST: fever (-), chills (-) or fatigue (-)  GI: abdominal distension (-), abdominal pain (-) , nausea/vomiting (-), hematemesis, (-), melena (-), hematochezia (-)  : dysuria (-), frequency (-), hematuria (-).   NEURO: numbness (-), weakness (-), dizziness (-)  MSK: myalgia (-), joint pain (-)   SKIN: itching (-), rash (-)  HEENT:  visual changes (-); vertigo or throat pain (-);  neck stiffness (-)   Psych: change in mood (-), anxiety (-), depression (-)     All other review of systems is negative unless indicated above.   -------------------------------------------------------------------------------------------  VS:  T(F): 97.7 (), Max: 98.1 ()  HR: 92 () (85 - 98)  BP: 93/60 () (90/60 - 98/63)  RR: 17 ()  SpO2: 98% ()  I&O's Summary    2023 07:  -  2023 07:00  --------------------------------------------------------  IN: 983 mL / OUT: 0 mL / NET: 983 mL    2023 07:  -  2023 18:11  --------------------------------------------------------  IN: 600 mL / OUT: 0 mL / NET: 600 mL      ------------------------------------------------------------------------------------------  PHYSICAL EXAM:  GENERAL: NAD  HEAD:  Atraumatic, Normocephalic.  EYES: EOMI, PERRLA, conjunctiva and sclera clear.  ENT: Moist mucous membranes.  NECK: Supple, No JVD.  CHEST/LUNG: Clear to auscultation bilaterally; No rales, rhonchi, wheezing, or rubs. Unlabored respirations.  HEART: Regular rate and rhythm; No murmurs, rubs, or gallops.  ABDOMEN: Bowel sounds present; Soft, Nontender, Nondistended.   EXTREMITIES: No edema. 2+ Peripheral Pulses, brisk capillary refill. No clubbing or cyanosis.   PSYCH: Normal affect.  SKIN: No rashes or lesions.  -------------------------------------------------------------------------------------------  LABS:                          10.8   10.44 )-----------( 250      ( 2023 06:04 )             33.3         136  |  103  |  12  ----------------------------<  107<H>  3.5   |  23  |  0.70    Ca    8.6      2023 06:04  Phos  2.8       Mg     2.0         TPro  6.5  /  Alb  2.6<L>  /  TBili  1.2  /  DBili  x   /  AST  122<H>  /  ALT  160<H>  /  AlkPhos  172<H>      PT/INR - ( 2023 00:52 )   PT: 15.8 sec;   INR: 1.36 ratio         PTT - ( 2023 11:31 )  PTT:71.4 sec  CARDIAC MARKERS ( 2023 04:14 )  3728 ng/L / x     / x     / 2013 U/L / x     / 94.2 ng/mL  CARDIAC MARKERS ( 2023 01:03 )  4215 ng/L / x     / x     / 2366 U/L / x     / 129.0 ng/mL  CARDIAC MARKERS ( 2023 17:18 )  3979 ng/L / x     / x     / 2941 U/L / x     / 233.1 ng/mL      Total Cholesterol: 159  LDL: --  HDL: 51  T        -------------------------------------------------------------------------------------------  Meds:  aspirin  chewable 81 milliGRAM(s) Oral daily  atorvastatin 80 milliGRAM(s) Oral at bedtime  dapagliflozin 10 milliGRAM(s) Oral every 24 hours  influenza  Vaccine (HIGH DOSE) 0.7 milliLiter(s) IntraMuscular once  losartan 12.5 milliGRAM(s) Oral at bedtime  metoprolol tartrate 12.5 milliGRAM(s) Oral two times a day  ticagrelor 90 milliGRAM(s) Oral every 12 hours    -------------------------------------------------------------------------------------------  Cardiovascular Diagnostic Testing:    ECG:     Echo:   - s/p TTE on 23- Mild mitral annular calcification. Tethered mitral valve leaflets with normal opening. Mild mitral regurgitation. Calcified trileaflet aortic valve with normal opening. Minimal aortic regurgitation. Endocardial visualization enhanced with intravenous injection of Ultrasonic Enhancing Agent  Moderate segmental left ventricular systolic dysfunction distal septum, distal anterolateral, distal inferior, mid to distal anterior, distal inferolateral, basal to mid anteroseptal, and the apex are akinetic. 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 No left ventricular thrombus. Normal right ventricular size and function.    Stress Testing:    Cath:  Diagnostic Findings:     Coronary Angiography   The coronary circulation is right dominant. Cardiac catheterization  was performed urgently.    LM   Left main artery: Normal. Short.      LAD   Proximal left anterior descending: The distal vessel is supplied by  faint collaterals from the Right posterior descending artery.  There is a 100 % stenosis. MARTHA Flow 0.      CX   Mid circumflex: There is a 50 % stenosis in the middle third portion  of the segment after branch to OM1. MARTHA Flow 3. Second  obtuse marginal: There is a 40 % stenosis in the proximal third  portion of the segment.    RCA   Mid right coronary artery: There is a 20 % stenosis in the middle  third portion of the segment.    Left Heart Cath   Left ventricular function was not assessed.      Imaging:  < from: MR Cardiac w/wo IV Cont (23 @ 12:38) >  IMPRESSION:.    Image quality markedly degraded due to motion artifact. In particular,   the late gadolinium enhancement sequences are limited due to motion.    Transmural scarring involving the mid anterior wall of the left   ventricle. Please note that due tostudy limitations it is difficult to   exclude additional sites of myocardial scarring.    There is likely microvascular obstruction involving the left ventricular   apical anterior wall and majority of the septal wall.    Mid to apical lateral and inferior walls of the left ventricle are   hypokinetic.    Left ventricular basilar wall segments, with the exception of the septal   wall, appear relatively preserved.      < end of copied text >      CXR:  reviewed  -------------------------------------------------------------------------------------------  Assessment and Plan:     67F late presenting STEMI, HFrEF (EF 36%) for follow up.     1. STEMI: cath and viability study reviewed.   patient is currently chest pain free.   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 Discussed with Dr. Baron- plan for outpatient follow up consideration for revascularization.   - c/w Brilinta, ASA, heparin x48 hours and then stop.     2. HFrEF:   - start SGLT2i, losartan today. Transition to Entresto 24/ bid starting tomorrow if BP tolerates. hold for SBP <85  - continue toprol at current dose    Konrad Aviles MD  Attending Physician   Cardiology Inpatient Consult Service     [facility] at [place]   [address], [address]  [place] [address]   Tel: [phone]  Fax: [phone]    Please check [email or web address]: [email or web address] for cardiology service schedule and contact information.

## 2023-01-24 NOTE — PROGRESS NOTE ADULT - PROBLEM SELECTOR PLAN 1
late presentation of MI. [facility] with 100% stenosis of LAD w/ distal collaterals S/P viability study  EF 36% per imaging  - Discussed with cardiology Dr Aviles, appreciate recs. Plan for GDMT - start losartan 12.5 mg bedtime, start farxiga 10 mg. Plan to transition losartan to entresto tomorrow if BP allows.  - Holding lasix currently, likely will need 20 mg every other day.  - Continue with ASA 81 mg PO Qd and brilinta 90 mg PO BID for DAPT   - Continue with metoprolol tartrate 12.g mg PO BID  - Continue with atorvastatin 80 mg PO Qd   - trend LFTS and transaminitis likely due to MI and would benefit from continuation of statin late presentation of MI. [facility] with 100% stenosis of LAD w/ distal collaterals S/P viability study  EF 36% per imaging  - reviewed cMRI result  - Discussed with cardiology Dr Aviles, appreciate recs. Plan for GDMT - start losartan 12.5 mg bedtime, start farxiga 10 mg. Plan to transition losartan to entresto tomorrow if BP allows.  - Holding lasix currently, likely will need 20 mg every other day.  - Continue with ASA 81 mg PO Qd and brilinta 90 mg PO BID for DAPT   - Continue with metoprolol tartrate 12.g mg PO BID  - Continue with atorvastatin 80 mg PO Qd   - trend LFTS and transaminitis likely due to MI and would benefit from continuation of statin

## 2023-01-25 LAB
ALBUMIN SERPL ELPH-MCNC: 2.6 G/DL — LOW (ref 3.3–5)
ALP SERPL-CCNC: 196 U/L — HIGH (ref 40–120)
ALT FLD-CCNC: 130 U/L — HIGH (ref 10–45)
ANION GAP SERPL CALC-SCNC: 12 MMOL/L — SIGNIFICANT CHANGE UP (ref 5–17)
AST SERPL-CCNC: 83 U/L — HIGH (ref 10–40)
BILIRUB SERPL-MCNC: 0.9 MG/DL — SIGNIFICANT CHANGE UP (ref 0.2–1.2)
BUN SERPL-MCNC: 10 MG/DL — SIGNIFICANT CHANGE UP (ref 7–23)
CALCIUM SERPL-MCNC: 8.9 MG/DL — SIGNIFICANT CHANGE UP (ref 8.4–10.5)
CHLORIDE SERPL-SCNC: 105 MMOL/L — SIGNIFICANT CHANGE UP (ref 96–108)
CO2 SERPL-SCNC: 21 MMOL/L — LOW (ref 22–31)
CREAT SERPL-MCNC: 0.72 MG/DL — SIGNIFICANT CHANGE UP (ref 0.5–1.3)
EGFR: 92 ML/MIN/1.73M2 — SIGNIFICANT CHANGE UP
GLUCOSE SERPL-MCNC: 97 MG/DL — SIGNIFICANT CHANGE UP (ref 70–99)
HCT VFR BLD CALC: 31 % — LOW (ref 34.5–45)
HGB BLD-MCNC: 10.2 G/DL — LOW (ref 11.5–15.5)
MCHC RBC-ENTMCNC: 29.1 PG — SIGNIFICANT CHANGE UP (ref 27–34)
MCHC RBC-ENTMCNC: 32.9 GM/DL — SIGNIFICANT CHANGE UP (ref 32–36)
MCV RBC AUTO: 88.6 FL — SIGNIFICANT CHANGE UP (ref 80–100)
NRBC # BLD: 0 /100 WBCS — SIGNIFICANT CHANGE UP (ref 0–0)
PLATELET # BLD AUTO: 279 K/UL — SIGNIFICANT CHANGE UP (ref 150–400)
POTASSIUM SERPL-MCNC: 3.8 MMOL/L — SIGNIFICANT CHANGE UP (ref 3.5–5.3)
POTASSIUM SERPL-SCNC: 3.8 MMOL/L — SIGNIFICANT CHANGE UP (ref 3.5–5.3)
PROT SERPL-MCNC: 6.4 G/DL — SIGNIFICANT CHANGE UP (ref 6–8.3)
RBC # BLD: 3.5 M/UL — LOW (ref 3.8–5.2)
RBC # FLD: 14 % — SIGNIFICANT CHANGE UP (ref 10.3–14.5)
SODIUM SERPL-SCNC: 138 MMOL/L — SIGNIFICANT CHANGE UP (ref 135–145)
WBC # BLD: 8.36 K/UL — SIGNIFICANT CHANGE UP (ref 3.8–10.5)
WBC # FLD AUTO: 8.36 K/UL — SIGNIFICANT CHANGE UP (ref 3.8–10.5)

## 2023-01-25 PROCEDURE — 99233 SBSQ HOSP IP/OBS HIGH 50: CPT

## 2023-01-25 RX ORDER — SACUBITRIL AND VALSARTAN 24; 26 MG/1; MG/1
1 TABLET, FILM COATED ORAL
Refills: 0 | Status: DISCONTINUED | OUTPATIENT
Start: 2023-01-25 | End: 2023-01-25

## 2023-01-25 RX ORDER — SACUBITRIL AND VALSARTAN 24; 26 MG/1; MG/1
1 TABLET, FILM COATED ORAL
Refills: 0 | Status: DISCONTINUED | OUTPATIENT
Start: 2023-01-25 | End: 2023-01-27

## 2023-01-25 RX ORDER — METOPROLOL TARTRATE 50 MG
12.5 TABLET ORAL DAILY
Refills: 0 | Status: DISCONTINUED | OUTPATIENT
Start: 2023-01-25 | End: 2023-01-27

## 2023-01-25 RX ADMIN — Medication 81 MILLIGRAM(S): at 10:52

## 2023-01-25 RX ADMIN — ATORVASTATIN CALCIUM 80 MILLIGRAM(S): 80 TABLET, FILM COATED ORAL at 21:55

## 2023-01-25 RX ADMIN — ENOXAPARIN SODIUM 40 MILLIGRAM(S): 100 INJECTION SUBCUTANEOUS at 05:09

## 2023-01-25 RX ADMIN — TICAGRELOR 90 MILLIGRAM(S): 90 TABLET ORAL at 16:38

## 2023-01-25 RX ADMIN — SACUBITRIL AND VALSARTAN 1 TABLET(S): 24; 26 TABLET, FILM COATED ORAL at 16:38

## 2023-01-25 RX ADMIN — TICAGRELOR 90 MILLIGRAM(S): 90 TABLET ORAL at 05:09

## 2023-01-25 NOTE — PROGRESS NOTE ADULT - SUBJECTIVE AND OBJECTIVE BOX
Cardiology Progress Note  ------------------------------------------------------------------------------------------  SUBJECTIVE:   No events overnight. Denies CP, SOB or Palpitations.   -------------------------------------------------------------------------------------------  ROS:  CV: chest pain (-), palpitation (-), orthopnea (-), PND (-), edema (-)  PULM: SOB (-), cough (-), wheezing (-), hemoptysis (-).   CONST: fever (-), chills (-) or fatigue (-)  GI: abdominal distension (-), abdominal pain (-) , nausea/vomiting (-), hematemesis, (-), melena (-), hematochezia (-)  : dysuria (-), frequency (-), hematuria (-).   NEURO: numbness (-), weakness (-), dizziness (-)  MSK: myalgia (-), joint pain (-)   SKIN: itching (-), rash (-)  HEENT:  visual changes (-); vertigo or throat pain (-);  neck stiffness (-)   Psych: change in mood (-), anxiety (-), depression (-)     All other review of systems is negative unless indicated above.   -------------------------------------------------------------------------------------------  VS:  T(F): 97.9 (), Max: 98 ()  HR: 91 () (91 - 92)  BP: 100/64 () (81/57 - 100/64)  RR: 18 ()  SpO2: 98% ()  I&O's Summary    2023 07: 07:00  --------------------------------------------------------  IN: 600 mL / OUT: 0 mL / NET: 600 mL    2023 07:  -  2023 10:27  --------------------------------------------------------  IN: 120 mL / OUT: 0 mL / NET: 120 mL      ------------------------------------------------------------------------------------------  PHYSICAL EXAM:  GENERAL: NAD  HEAD:  Atraumatic, Normocephalic.  EYES: EOMI, PERRLA, conjunctiva and sclera clear.  ENT: Moist mucous membranes.  NECK: Supple, No JVD.  CHEST/LUNG: Clear to auscultation bilaterally; No rales, rhonchi, wheezing, or rubs. Unlabored respirations.  HEART: Regular rate and rhythm; No murmurs, rubs, or gallops.  ABDOMEN: Bowel sounds present; Soft, Nontender, Nondistended.   EXTREMITIES: No edema. 2+ Peripheral Pulses, brisk capillary refill. No clubbing or cyanosis.   PSYCH: Normal affect.  SKIN: No rashes or lesions.  -------------------------------------------------------------------------------------------  LABS:                          10.2   8.36  )-----------( 279      ( 2023 05:45 )             31.0         138  |  105  |  10  ----------------------------<  97  3.8   |  21<L>  |  0.72    Ca    8.9      2023 05:45  Phos  2.8       Mg     2.0         TPro  6.4  /  Alb  2.6<L>  /  TBili  0.9  /  DBili  x   /  AST  83<H>  /  ALT  130<H>  /  AlkPhos  196<H>      PTT - ( 2023 11:31 )  PTT:71.4 sec  CARDIAC MARKERS ( 2023 04:14 )  3728 ng/L / x     / x     / 2013 U/L / x     / 94.2 ng/mL  CARDIAC MARKERS ( 2023 01:03 )  4215 ng/L / x     / x     / 2366 U/L / x     / 129.0 ng/mL  CARDIAC MARKERS ( 2023 17:18 )  3979 ng/L / x     / x     / 2941 U/L / x     / 233.1 ng/mL      Total Cholesterol: 159  LDL: --  HDL: 51  T        -------------------------------------------------------------------------------------------  Meds:  aspirin  chewable 81 milliGRAM(s) Oral daily  atorvastatin 80 milliGRAM(s) Oral at bedtime  dapagliflozin 10 milliGRAM(s) Oral every 24 hours  enoxaparin Injectable 40 milliGRAM(s) SubCutaneous every 24 hours  influenza  Vaccine (HIGH DOSE) 0.7 milliLiter(s) IntraMuscular once  losartan 12.5 milliGRAM(s) Oral at bedtime  metoprolol tartrate 12.5 milliGRAM(s) Oral two times a day  ticagrelor 90 milliGRAM(s) Oral every 12 hours    -------------------------------------------------------------------------------------------  Cardiovascular Diagnostic Testing:    ECG:     Echo:     Stress Testing:    Cath:    Imaging:    CXR:  reviewed  -------------------------------------------------------------------------------------------  Assessment and Plan:     67F late presenting STEMI, HFrEF (EF 36%) for follow up.     1. STEMI: cath and viability study reviewed.   patient is currently chest pain free.   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 Discussed with Dr. Baron- plan for outpatient follow up consideration for revascularization and repeat viability study  - c/w Brilinta, ASA, heparin x48 hours and then stop.     2. HFrEF:   - start SGLT2i, losartan today. Transition to Entresto 24/26 bid starting tomorrow if BP tolerates. hold for SBP <85  - continue toprol at current dose    Konrad Aviles MD  Attending Physician   Cardiology Inpatient Consult Service     [facility] at [place]   [address], [address]  [place] [address]   Tel: [phone]  Fax: [phone]    Please check [email or web address] password: [email or web address]" for cardiology service schedule and contact information.  Cardiology Progress Note  ------------------------------------------------------------------------------------------  SUBJECTIVE:   No events overnight. Denies CP, SOB or Palpitations.   -------------------------------------------------------------------------------------------  ROS:  CV: chest pain (-), palpitation (-), orthopnea (-), PND (-), edema (-)  PULM: SOB (-), cough (-), wheezing (-), hemoptysis (-).   CONST: fever (-), chills (-) or fatigue (-)  GI: abdominal distension (-), abdominal pain (-) , nausea/vomiting (-), hematemesis, (-), melena (-), hematochezia (-)  : dysuria (-), frequency (-), hematuria (-).   NEURO: numbness (-), weakness (-), dizziness (-)  MSK: myalgia (-), joint pain (-)   SKIN: itching (-), rash (-)  HEENT:  visual changes (-); vertigo or throat pain (-);  neck stiffness (-)   Psych: change in mood (-), anxiety (-), depression (-)     All other review of systems is negative unless indicated above.   -------------------------------------------------------------------------------------------  VS:  T(F): 97.9 (), Max: 98 ()  HR: 91 () (91 - 92)  BP: 100/64 () (81/57 - 100/64)  RR: 18 ()  SpO2: 98% ()  I&O's Summary    2023 07: 07:00  --------------------------------------------------------  IN: 600 mL / OUT: 0 mL / NET: 600 mL    2023 07:  -  2023 10:27  --------------------------------------------------------  IN: 120 mL / OUT: 0 mL / NET: 120 mL      ------------------------------------------------------------------------------------------  PHYSICAL EXAM:  GENERAL: NAD  HEAD:  Atraumatic, Normocephalic.  EYES: EOMI, PERRLA, conjunctiva and sclera clear.  ENT: Moist mucous membranes.  NECK: Supple, No JVD.  CHEST/LUNG: Clear to auscultation bilaterally; No rales, rhonchi, wheezing, or rubs. Unlabored respirations.  HEART: Regular rate and rhythm; No murmurs, rubs, or gallops.  ABDOMEN: Bowel sounds present; Soft, Nontender, Nondistended.   EXTREMITIES: No edema. 2+ Peripheral Pulses, brisk capillary refill. No clubbing or cyanosis.   PSYCH: Normal affect.  SKIN: No rashes or lesions.  -------------------------------------------------------------------------------------------  LABS:                          10.2   8.36  )-----------( 279      ( 2023 05:45 )             31.0         138  |  105  |  10  ----------------------------<  97  3.8   |  21<L>  |  0.72    Ca    8.9      2023 05:45  Phos  2.8       Mg     2.0         TPro  6.4  /  Alb  2.6<L>  /  TBili  0.9  /  DBili  x   /  AST  83<H>  /  ALT  130<H>  /  AlkPhos  196<H>      PTT - ( 2023 11:31 )  PTT:71.4 sec  CARDIAC MARKERS ( 2023 04:14 )  3728 ng/L / x     / x     / 2013 U/L / x     / 94.2 ng/mL  CARDIAC MARKERS ( 2023 01:03 )  4215 ng/L / x     / x     / 2366 U/L / x     / 129.0 ng/mL  CARDIAC MARKERS ( 2023 17:18 )  3979 ng/L / x     / x     / 2941 U/L / x     / 233.1 ng/mL      Total Cholesterol: 159  LDL: --  HDL: 51  T        -------------------------------------------------------------------------------------------  Meds:  aspirin  chewable 81 milliGRAM(s) Oral daily  atorvastatin 80 milliGRAM(s) Oral at bedtime  dapagliflozin 10 milliGRAM(s) Oral every 24 hours  enoxaparin Injectable 40 milliGRAM(s) SubCutaneous every 24 hours  influenza  Vaccine (HIGH DOSE) 0.7 milliLiter(s) IntraMuscular once  losartan 12.5 milliGRAM(s) Oral at bedtime  metoprolol tartrate 12.5 milliGRAM(s) Oral two times a day  ticagrelor 90 milliGRAM(s) Oral every 12 hours    -------------------------------------------------------------------------------------------  Cardiovascular Diagnostic Testing:    ECG:     Echo:     Stress Testing:    Cath:    Imaging:    CXR:  reviewed  -------------------------------------------------------------------------------------------  Assessment and Plan:     67F late presenting STEMI, HFrEF (EF 36%) for follow up. Patient with borderline BP, asymptomatic.   Will try initiating entresto inpatient     1. STEMI: cath and viability study reviewed.   patient is currently chest pain free.   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 Discussed with Dr. Baron- plan for outpatient follow up consideration for revascularization and repeat viability study  - c/w Brilinta, ASA, heparin x48 hours and then stop.     2. HFrEF:   - Transition to Entresto 24/26 bid if BP tolerates. hold for SBP <85  - transition to toprol 12.5 xl daily   - patient hesitant to start SGLT2i inpatient and would defer this and MRA to outpatient.     Konrad Aviles MD  Attending Physician   Cardiology Inpatient Consult Service     [facility] at [place]   [address], [address]  [place] [address]   Tel: [phone]  Fax: [phone]    Please check [email or web address] password: [email or web address]" for cardiology service schedule and contact information.

## 2023-01-25 NOTE — PHARMACOTHERAPY INTERVENTION NOTE - COMMENTS
Spoke to patient and daughter (Himanshu) about indication, directions, and side effect profile of inpatient medications. Patient's BP has been trending low (93/57) and she showed concerns regarding some of her BP lowering medications. Informed that both entresto and metoprolol may lower BP but are also at their lowest doses; reassured all other medications should not affect BP. Informed of switch from losartan to entresto, intermittent furosemide use, discontinuation of farxiga. Informed patient of antiplatelet/anticoagulant use in hospital as well lipid lowering agent.    MEDICATIONS  (STANDING):  aspirin  chewable 81 milliGRAM(s) Oral daily  atorvastatin 80 milliGRAM(s) Oral at bedtime  enoxaparin Injectable 40 milliGRAM(s) SubCutaneous every 24 hours  metoprolol succinate ER 12.5 milliGRAM(s) Oral daily  sacubitril 24 mG/valsartan 26 mG 1 Tablet(s) Oral two times a day  ticagrelor 90 milliGRAM(s) Oral every 12 hours    Would suggest adding BP hold parameters inpatient (metoprolol/entresto) and defer addition of SGLT2 inhibitor at a later time    Hunter Moore, PharmD, BCPS  [phone]  Available on [employer] Teams

## 2023-01-25 NOTE — PROGRESS NOTE ADULT - PROBLEM SELECTOR PLAN 1
late presentation of MI. [facility] with 100% stenosis of LAD w/ distal collaterals S/P viability study  EF 36% per imaging  - reviewed cMRI result  - Discussed with cardiology Dr Aviles, appreciate recs. Plan for GDMT - start losartan 12.5 mg bedtime, start farxiga 10 mg. Plan to transition losartan to entresto tomorrow if BP allows.  - Holding lasix currently, likely will need 20 mg every other day.  - Continue with ASA 81 mg PO Qd and brilinta 90 mg PO BID for DAPT   - Continue with metoprolol tartrate 12.5mg mg PO xl daily.   - Continue with atorvastatin 80 mg PO Qd   - trend LFTS and transaminitis likely due to MI and would benefit from continuation of statin  -Update 1/25: extensive discussion bedside with patient and her daughter and Dr. Aviles. -patient/daughter don't want farxiga. Willing to try entresto and metoprolol lowest doses without hold parameters to assess tolerability. thus far hasn't been getting many/any doses due to low BP. However, not symptomatic from low BP. need to assess for any symptoms with movement, etc.

## 2023-01-25 NOTE — PROGRESS NOTE ADULT - SUBJECTIVE AND OBJECTIVE BOX
Patient is a 67y old  Female who presents with a chief complaint of cp (24 Jan 2023 14:38)        SUBJECTIVE / OVERNIGHT EVENTS: no dizziness. no cp. occasional sob with movement.       MEDICATIONS  (STANDING):  aspirin  chewable 81 milliGRAM(s) Oral daily  atorvastatin 80 milliGRAM(s) Oral at bedtime  enoxaparin Injectable 40 milliGRAM(s) SubCutaneous every 24 hours  influenza  Vaccine (HIGH DOSE) 0.7 milliLiter(s) IntraMuscular once  metoprolol succinate ER 12.5 milliGRAM(s) Oral daily  sacubitril 24 mG/valsartan 26 mG 1 Tablet(s) Oral two times a day  ticagrelor 90 milliGRAM(s) Oral every 12 hours    MEDICATIONS  (PRN):      Vital Signs Last 24 Hrs  T(C): 36.7 (25 Jan 2023 16:29), Max: 36.7 (24 Jan 2023 21:33)  T(F): 98.1 (25 Jan 2023 16:29), Max: 98.1 (25 Jan 2023 16:29)  HR: 96 (25 Jan 2023 16:29) (91 - 96)  BP: 92/67 (25 Jan 2023 16:29) (81/57 - 100/64)  BP(mean): 65 (24 Jan 2023 21:33) (65 - 65)  RR: 17 (25 Jan 2023 16:29) (17 - 18)  SpO2: 98% (25 Jan 2023 16:29) (98% - 100%)    Parameters below as of 25 Jan 2023 16:29  Patient On (Oxygen Delivery Method): room air      CAPILLARY BLOOD GLUCOSE        I&O's Summary    24 Jan 2023 07:01  -  25 Jan 2023 07:00  --------------------------------------------------------  IN: 600 mL / OUT: 0 mL / NET: 600 mL    25 Jan 2023 07:01  -  25 Jan 2023 16:51  --------------------------------------------------------  IN: 240 mL / OUT: 0 mL / NET: 240 mL          PHYSICAL EXAM:   GENERAL: NAD, well-developed  HEAD:  Atraumatic, Normocephalic  EYES: EOMI, PERRLA, conjunctiva and sclera clear  NECK: Supple,    CHEST/LUNG: Clear to auscultation bilaterally; No wheeze  HEART: S1S2 normal. Regular rate and rhythm; No murmurs, rubs, or gallops  ABDOMEN: Soft, Nontender, Nondistended; Bowel sounds present  EXTREMITIES:  trace LE edema  PSYCH/Neuro: AAOx3. Non-focal.   SKIN: No rashes or lesions      LABS:                        10.2   8.36  )-----------( 279      ( 25 Jan 2023 05:45 )             31.0     01-25    138  |  105  |  10  ----------------------------<  97  3.8   |  21<L>  |  0.72    Ca    8.9      25 Jan 2023 05:45  Phos  2.8     01-24  Mg     2.0     01-24    TPro  6.4  /  Alb  2.6<L>  /  TBili  0.9  /  DBili  x   /  AST  83<H>  /  ALT  130<H>  /  AlkPhos  196<H>  01-25    PTT - ( 24 Jan 2023 11:31 )  PTT:71.4 sec            RADIOLOGY & ADDITIONAL TESTS:    Imaging Personally Reviewed:  Consultant(s) Notes Reviewed:  cards  Care Discussed with Consultants/Other Providers:cards

## 2023-01-26 ENCOUNTER — TRANSCRIPTION ENCOUNTER (OUTPATIENT)
Age: 68
End: 2023-01-26

## 2023-01-26 LAB
ALBUMIN SERPL ELPH-MCNC: 2.7 G/DL — LOW (ref 3.3–5)
ALP SERPL-CCNC: 260 U/L — HIGH (ref 40–120)
ALT FLD-CCNC: 130 U/L — HIGH (ref 10–45)
ANION GAP SERPL CALC-SCNC: 13 MMOL/L — SIGNIFICANT CHANGE UP (ref 5–17)
AST SERPL-CCNC: 96 U/L — HIGH (ref 10–40)
BILIRUB SERPL-MCNC: 1 MG/DL — SIGNIFICANT CHANGE UP (ref 0.2–1.2)
BUN SERPL-MCNC: 11 MG/DL — SIGNIFICANT CHANGE UP (ref 7–23)
CALCIUM SERPL-MCNC: 8.9 MG/DL — SIGNIFICANT CHANGE UP (ref 8.4–10.5)
CHLORIDE SERPL-SCNC: 102 MMOL/L — SIGNIFICANT CHANGE UP (ref 96–108)
CO2 SERPL-SCNC: 21 MMOL/L — LOW (ref 22–31)
CREAT SERPL-MCNC: 0.71 MG/DL — SIGNIFICANT CHANGE UP (ref 0.5–1.3)
EGFR: 94 ML/MIN/1.73M2 — SIGNIFICANT CHANGE UP
GLUCOSE SERPL-MCNC: 109 MG/DL — HIGH (ref 70–99)
HCT VFR BLD CALC: 30.4 % — LOW (ref 34.5–45)
HGB BLD-MCNC: 9.8 G/DL — LOW (ref 11.5–15.5)
MCHC RBC-ENTMCNC: 28.8 PG — SIGNIFICANT CHANGE UP (ref 27–34)
MCHC RBC-ENTMCNC: 32.2 GM/DL — SIGNIFICANT CHANGE UP (ref 32–36)
MCV RBC AUTO: 89.4 FL — SIGNIFICANT CHANGE UP (ref 80–100)
NRBC # BLD: 0 /100 WBCS — SIGNIFICANT CHANGE UP (ref 0–0)
PLATELET # BLD AUTO: 290 K/UL — SIGNIFICANT CHANGE UP (ref 150–400)
POTASSIUM SERPL-MCNC: 3.6 MMOL/L — SIGNIFICANT CHANGE UP (ref 3.5–5.3)
POTASSIUM SERPL-SCNC: 3.6 MMOL/L — SIGNIFICANT CHANGE UP (ref 3.5–5.3)
PROT SERPL-MCNC: 6.5 G/DL — SIGNIFICANT CHANGE UP (ref 6–8.3)
RBC # BLD: 3.4 M/UL — LOW (ref 3.8–5.2)
RBC # FLD: 14 % — SIGNIFICANT CHANGE UP (ref 10.3–14.5)
SODIUM SERPL-SCNC: 136 MMOL/L — SIGNIFICANT CHANGE UP (ref 135–145)
WBC # BLD: 10.08 K/UL — SIGNIFICANT CHANGE UP (ref 3.8–10.5)
WBC # FLD AUTO: 10.08 K/UL — SIGNIFICANT CHANGE UP (ref 3.8–10.5)

## 2023-01-26 PROCEDURE — 99233 SBSQ HOSP IP/OBS HIGH 50: CPT

## 2023-01-26 PROCEDURE — 99232 SBSQ HOSP IP/OBS MODERATE 35: CPT

## 2023-01-26 RX ORDER — SACUBITRIL AND VALSARTAN 24; 26 MG/1; MG/1
1 TABLET, FILM COATED ORAL
Qty: 60 | Refills: 0
Start: 2023-01-26 | End: 2023-02-24

## 2023-01-26 RX ADMIN — SACUBITRIL AND VALSARTAN 1 TABLET(S): 24; 26 TABLET, FILM COATED ORAL at 17:09

## 2023-01-26 RX ADMIN — SACUBITRIL AND VALSARTAN 1 TABLET(S): 24; 26 TABLET, FILM COATED ORAL at 05:20

## 2023-01-26 RX ADMIN — ENOXAPARIN SODIUM 40 MILLIGRAM(S): 100 INJECTION SUBCUTANEOUS at 05:20

## 2023-01-26 RX ADMIN — TICAGRELOR 90 MILLIGRAM(S): 90 TABLET ORAL at 05:20

## 2023-01-26 RX ADMIN — Medication 81 MILLIGRAM(S): at 11:47

## 2023-01-26 RX ADMIN — ATORVASTATIN CALCIUM 80 MILLIGRAM(S): 80 TABLET, FILM COATED ORAL at 22:30

## 2023-01-26 RX ADMIN — TICAGRELOR 90 MILLIGRAM(S): 90 TABLET ORAL at 17:09

## 2023-01-26 NOTE — DISCHARGE NOTE PROVIDER - HOSPITAL COURSE
66 yo F with no known PMH presents to hospital with late STEMI and found with 100% occlusion of LAD, s/p cardiac viability study, currently medically managing.     Problem/Plan - 1:  ·  Problem: ACS (acute coronary syndrome).   ·  Plan: late presentation of MI. [facility] with 100% stenosis of LAD w/ distal collaterals S/P viability study  EF 36% per imaging  - Discussed with cardiology Dr Aviles, appreciate recs. c/w entresto low dose, metoprolol xl 12.5 mg po qday. TITRATING as BP allows given hypotension SBP 80s-90s.  - Holding lasix currently, likely will need 20 mg every other day PRN on discharge  - Continue with ASA 81 mg PO Qd and brilinta 90 mg PO BID for DAPT, atorvastatin 80 mg PO Qd.     Problem/Plan - 2:  ·  Problem: CAD (coronary artery disease).   ·  Plan: - as above, cardiology following.     Problem/Plan - 3:  ·  Problem: Need for prophylactic measure.   ·  Plan: DVT ppx -   lovenox qd   -PT/ambulate/oob to chair.    68 yo F with no known PMH presents to hospital with late STEMI and found with 100% occlusion of LAD, s/p cardiac viability study, currently medically managing.     Problem/Plan - 1:  ·  Problem: ACS (acute coronary syndrome).   ·  Plan: late presentation of MI. [facility] with 100% stenosis of LAD w/ distal collaterals S/P viability study  EF 36% per imaging  - Discussed with cardiology Dr Aviles, appreciate recs. c/w entresto low dose, metoprolol xl 12.5 mg po qday. TITRATING as BP allows given hypotension SBP 80s-90s.  - Plan for outpatient follow up with interventional cardiologist Dr. Martínez Baron, consideration for revascularization and repeat viability study  - Outpatient follow up with general cardiology  - Holding lasix currently, likely will need 20 mg every other day PRN on discharge  - Continue with ASA 81 mg PO Qd and brilinta 90 mg PO BID for DAPT, atorvastatin 80 mg PO Qd.     Problem/Plan - 2:  ·  Problem: CAD (coronary artery disease).   ·  Plan: - as above, cardiology following.     Problem/Plan - 3:  ·  Problem: Need for prophylactic measure.   ·  Plan: DVT ppx -   lovenox qd   -PT/ambulate/oob to chair.     Pt medically cleared for discharge by Dr. Moss.

## 2023-01-26 NOTE — PROGRESS NOTE ADULT - SUBJECTIVE AND OBJECTIVE BOX
Soniya Pool MD  Division of Hospital Medicine  Available via MS Teams  Pager [phone], If no response/off hours [phone]    Patient is a 67y old  Female who presents with a chief complaint of cardiac issue (25 Jan 2023 12:29)        SUBJECTIVE / OVERNIGHT EVENTS:  overnight no events  no n/v/f/chills, cp, sob, abd pain  hesitant to start farxiga yesterday   denies dizziness or lightheadedness  ambulating  seen w/ daughter at bedside      I&O's Summary    25 Jan 2023 07:01  -  26 Jan 2023 07:00  --------------------------------------------------------  IN: 240 mL / OUT: 0 mL / NET: 240 mL    26 Jan 2023 07:01  -  26 Jan 2023 12:41  --------------------------------------------------------  IN: 140 mL / OUT: 0 mL / NET: 140 mL      Vital Signs Last 24 Hrs  T(C): 36.9 (26 Jan 2023 12:23), Max: 37.8 (25 Jan 2023 20:47)  T(F): 98.4 (26 Jan 2023 12:23), Max: 100.1 (25 Jan 2023 20:47)  HR: 88 (26 Jan 2023 12:23) (80 - 98)  BP: 120/70 (26 Jan 2023 12:23) (87/52 - 120/70)  BP(mean): --  RR: 18 (26 Jan 2023 12:23) (17 - 18)  SpO2: 95% (26 Jan 2023 12:23) (95% - 98%)    Parameters below as of 26 Jan 2023 12:23  Patient On (Oxygen Delivery Method): room air      PHYSICAL EXAM:  GENERAL:  Well appearing, in NAD  HEAD:  NCAT  EYES: PERRLA, conjunctiva clear  NECK: Supple, No JVD  CHEST/LUNG: CTA B/L. No w/r/r.  HEART: Reg rate. Normal S1, S2. No m/r/g.   ABDOMEN: SNTND.   EXTREMITIES:  2+ Peripheral Pulses, No clubbing, cyanosis, edema.  PSYCH: AAOx3, appropriate affect    LABS:                        9.8    10.08 )-----------( 290      ( 26 Jan 2023 05:43 )             30.4     01-26    136  |  102  |  11  ----------------------------<  109<H>  3.6   |  21<L>  |  0.71    Ca    8.9      26 Jan 2023 05:43    TPro  6.5  /  Alb  2.7<L>  /  TBili  1.0  /  DBili  x   /  AST  96<H>  /  ALT  130<H>  /  AlkPhos  260<H>  01-26          CAPILLARY BLOOD GLUCOSE        MEDICATIONS  (STANDING):  aspirin  chewable 81 milliGRAM(s) Oral daily  atorvastatin 80 milliGRAM(s) Oral at bedtime  enoxaparin Injectable 40 milliGRAM(s) SubCutaneous every 24 hours  influenza  Vaccine (HIGH DOSE) 0.7 milliLiter(s) IntraMuscular once  metoprolol succinate ER 12.5 milliGRAM(s) Oral daily  sacubitril 24 mG/valsartan 26 mG 1 Tablet(s) Oral two times a day  ticagrelor 90 milliGRAM(s) Oral every 12 hours    MEDICATIONS  (PRN):

## 2023-01-26 NOTE — DISCHARGE NOTE PROVIDER - NSFOLLOWUPCLINICS_GEN_ALL_ED_FT
[facility] General Internal Medicine  General Internal Medicine  [address] [id number]  Phone: [phone]  Fax:   Follow Up Time: 1 week

## 2023-01-26 NOTE — DISCHARGE NOTE PROVIDER - NSDCMRMEDTOKEN_GEN_ALL_CORE_FT
sacubitril-valsartan 24 mg-26 mg oral tablet: 1 tab(s) orally 2 times a day  ticagrelor 90 mg oral tablet: 1 tab(s) orally every 12 hours   aspirin 81 mg oral tablet, chewable: 1 tab(s) orally once a day  atorvastatin 80 mg oral tablet: 1 tab(s) orally once a day (at bedtime)  furosemide 40 mg oral tablet: 1 tab(s) orally every other day   metoprolol succinate 25 mg oral capsule, extended release: 0.5 cap(s) orally once a day   sacubitril-valsartan 24 mg-26 mg oral tablet: 1 tab(s) orally 2 times a day  ticagrelor 90 mg oral tablet: 1 tab(s) orally every 12 hours

## 2023-01-26 NOTE — DISCHARGE NOTE PROVIDER - PROVIDER TOKENS
PROVIDER:[TOKEN:[[id number]],FOLLOWUP:[1 month]] PROVIDER:[TOKEN:[[id number]],FOLLOWUP:[1 week]],FREE:[LAST:[Stefan],FIRST:[Konrad],PHONE:[phone]],FAX:[phone]],ADDRESS:[[facility] [place]   [address] [facility] [address], [address]  [place] [address]],FOLLOWUP:[1 week]]

## 2023-01-26 NOTE — DISCHARGE NOTE PROVIDER - NSDCCPCAREPLAN_GEN_ALL_CORE_FT
PRINCIPAL DISCHARGE DIAGNOSIS  Diagnosis: Non-ST elevation MI (NSTEMI)  Assessment and Plan of Treatment: Follow up with your PCP and cardiologist.   For the next few days, avoid physical activities that bring on chest pain. Continue physical activities as directed.   Do not smoke.   Avoid drinking alcohol.   Take all cardiac medications as prescribed - notify your doctor if you have any side effects.   Do not stop Aspirin or Plavix/Brilinta unless instructed by your cardiologist. Call your doctor if you have unusual chest pain, pressure, or discomfort, shortness of breath, nausea, vomiting, burping, heartburn, tingling upper body parts, sweating, cold, clammy sking, racing heartbeat  Follow cardiac diet - avoid fatty & fried foods, don't eat too much red meat, eat lots of fruits & vegetables, dairy products should be low fat  Lose weight if you are overweight  Become more active with walking, gardening, or any other activity that gets you to move  SEEK IMMEDIATE MEDICAL CARE IF:  You have increased chest pain or pain that spreads to your arm, neck, jaw, back, or abdomen, severe back or abdominal pain, feel nauseous, or vomit, severe weakness, fainting, or chills, heartburn, tingling upper body parts, sweating, cold, clammy sking, racing heartbeat  CALL 911  THIS IS AN EMERGENCY. Do not wait to see if the pain will go away. Get medical help at once.  Do not drive yourself to the hospital.         PRINCIPAL DISCHARGE DIAGNOSIS  Diagnosis: Non-ST elevation MI (NSTEMI)  Assessment and Plan of Treatment: Follow up with your PCP and cardiologist.   For the next few days, avoid physical activities that bring on chest pain. Continue physical activities as directed.   Do not smoke.   Avoid drinking alcohol.   Take all cardiac medications as prescribed - notify your doctor if you have any side effects.   Do not stop Aspirin or Plavix/Brilinta unless instructed by your cardiologist. Call your doctor if you have unusual chest pain, pressure, or discomfort, shortness of breath, nausea, vomiting, burping, heartburn, tingling upper body parts, sweating, cold, clammy sking, racing heartbeat  Follow cardiac diet - avoid fatty & fried foods, don't eat too much red meat, eat lots of fruits & vegetables, dairy products should be low fat  Lose weight if you are overweight  Become more active with walking, gardening, or any other activity that gets you to move  SEEK IMMEDIATE MEDICAL CARE IF:  You have increased chest pain or pain that spreads to your arm, neck, jaw, back, or abdomen, severe back or abdominal pain, feel nauseous, or vomit, severe weakness, fainting, or chills, heartburn, tingling upper body parts, sweating, cold, clammy sking, racing heartbeat  CALL 911  THIS IS AN EMERGENCY. Do not wait to see if the pain will go away. Get medical help at once.  Do not drive yourself to the hospital.        SECONDARY DISCHARGE DIAGNOSES  Diagnosis: CAD (coronary artery disease)  Assessment and Plan of Treatment: Follow up with your cardiologist and PCP.   Coronary artery disease is a condition where the arteries the supply the heart muscle get clogges with fatty deposits & puts you at risk for a heart attack  Call your doctor if you have any new pain, pressure, or discomfort in the center of your chest, pain, tingling or discomfort in arms, back, neck, jaw, or stomach, shortness of breath, nausea, vomiting, burping or heartburn, sweating, cold and clammy skin, racing or abnormal heartbeat for more than 10 minutes or if they keep coming & going.  Call 911 and do not tr to get to hospital by care  You can help yourself with lefestyle changes (quitting smoking if you smoke), eat lots of fruits & vegetables & low fat dairy products, not a lot of meat & fatty foods, walk or some form of physical activity most days of the week, lose weight if you are overweight  Take your cardiac medication as prescribed to lower cholesterol, to lower blood pressure, aspirin to prevent blood clots, and diabetes control  Make sure to keep appointments with doctor for cardiac follow up care

## 2023-01-26 NOTE — PROGRESS NOTE ADULT - PROBLEM SELECTOR PLAN 1
late presentation of MI. [facility] with 100% stenosis of LAD w/ distal collaterals S/P viability study  EF 36% per imaging  - Discussed with cardiology Dr Aviles, appreciate recs. c/w entresto low dose, metoprolol xl 12.5 mg po qday. TITRATING as BP allows given hypotension SBP 80s-90s.  - Holding lasix currently, likely will need 20 mg every other day PRN on discharge  - Continue with ASA 81 mg PO Qd and brilinta 90 mg PO BID for DAPT, atorvastatin 80 mg PO Qd

## 2023-01-26 NOTE — DISCHARGE NOTE PROVIDER - CARE PROVIDER_API CALL
Bradley Baron)  Cardiovascular Disease; Internal Medicine; Interventional Cardiology  [address] [place] [address], [address]  [place] [address]  Phone: [phone]  Fax: [phone]  Follow Up Time: 1 month   Bradley Baron)  Cardiovascular Disease; Internal Medicine; Interventional Cardiology  [address] [place] [address], [address]  [place] [address]  Phone: [phone]  Fax: [phone]  Follow Up Time: 1 week    [facility] [place]   [address] [facility], [address]  [place] [address]  Phone: [phone]  Fax: [phone]  Follow Up Time: 1 week

## 2023-01-26 NOTE — DISCHARGE NOTE PROVIDER - CARE PROVIDERS DIRECT ADDRESSES
[email or web address]

## 2023-01-26 NOTE — DISCHARGE NOTE PROVIDER - NSDCFUSCHEDAPPT_GEN_ALL_CORE_FT
Bradley Baron  [facility] Physician [facility]  CARDIOLOGY [address]  Scheduled Appointment: 02/08/2023

## 2023-01-26 NOTE — PROGRESS NOTE ADULT - SUBJECTIVE AND OBJECTIVE BOX
Cardiology Progress Note  ------------------------------------------------------------------------------------------  SUBJECTIVE:   No events overnight. Denies CP, SOB or Palpitations.   -------------------------------------------------------------------------------------------  ROS:  CV: chest pain (-), palpitation (-), orthopnea (-), PND (-), edema (-)  PULM: SOB (-), cough (-), wheezing (-), hemoptysis (-).   CONST: fever (-), chills (-) or fatigue (-)  GI: abdominal distension (-), abdominal pain (-) , nausea/vomiting (-), hematemesis, (-), melena (-), hematochezia (-)  : dysuria (-), frequency (-), hematuria (-).   NEURO: numbness (-), weakness (-), dizziness (-)  MSK: myalgia (-), joint pain (-)   SKIN: itching (-), rash (-)  HEENT:  visual changes (-); vertigo or throat pain (-);  neck stiffness (-)   Psych: change in mood (-), anxiety (-), depression (-)     All other review of systems is negative unless indicated above.   -------------------------------------------------------------------------------------------  VS:  T(F): 98.1 (01-26), Max: 100.1 (01-25)  HR: 80 (01-26) (80 - 98)  BP: 88/52 (01-26) (87/52 - 93/59)  RR: 18 (01-26)  SpO2: 98% (01-26)  I&O's Summary    25 Jan 2023 07:01  -  26 Jan 2023 07:00  --------------------------------------------------------  IN: 240 mL / OUT: 0 mL / NET: 240 mL      ------------------------------------------------------------------------------------------  PHYSICAL EXAM:  GENERAL: NAD  HEAD:  Atraumatic, Normocephalic.  EYES: EOMI, PERRLA, conjunctiva and sclera clear.  ENT: Moist mucous membranes.  NECK: Supple, No JVD.  CHEST/LUNG: Clear to auscultation bilaterally; No rales, rhonchi, wheezing, or rubs. Unlabored respirations.  HEART: Regular rate and rhythm; No murmurs, rubs, or gallops.  ABDOMEN: Bowel sounds present; Soft, Nontender, Nondistended.   EXTREMITIES: No edema. 2+ Peripheral Pulses, brisk capillary refill. No clubbing or cyanosis.   PSYCH: Normal affect.  SKIN: No rashes or lesions.  -------------------------------------------------------------------------------------------  LABS:                          9.8    10.08 )-----------( 290      ( 26 Jan 2023 05:43 )             30.4     01-26    136  |  102  |  11  ----------------------------<  109<H>  3.6   |  21<L>  |  0.71    Ca    8.9      26 Jan 2023 05:43    TPro  6.5  /  Alb  2.7<L>  /  TBili  1.0  /  DBili  x   /  AST  96<H>  /  ALT  130<H>  /  AlkPhos  260<H>  01-26    PTT - ( 24 Jan 2023 11:31 )  PTT:71.4 sec  CARDIAC MARKERS ( 22 Jan 2023 04:14 )  3728 ng/L / x     / x     / 2013 U/L / x     / 94.2 ng/mL  CARDIAC MARKERS ( 22 Jan 2023 01:03 )  4215 ng/L / x     / x     / 2366 U/L / x     / 129.0 ng/mL  CARDIAC MARKERS ( 21 Jan 2023 17:18 )  3979 ng/L / x     / x     / 2941 U/L / x     / 233.1 ng/mL            -------------------------------------------------------------------------------------------  Meds:  aspirin  chewable 81 milliGRAM(s) Oral daily  atorvastatin 80 milliGRAM(s) Oral at bedtime  enoxaparin Injectable 40 milliGRAM(s) SubCutaneous every 24 hours  influenza  Vaccine (HIGH DOSE) 0.7 milliLiter(s) IntraMuscular once  metoprolol succinate ER 12.5 milliGRAM(s) Oral daily  sacubitril 24 mG/valsartan 26 mG 1 Tablet(s) Oral two times a day  ticagrelor 90 milliGRAM(s) Oral every 12 hours    -------------------------------------------------------------------------------------------  Cardiovascular Diagnostic Testing:    ECG:     Echo:     Stress Testing:    Cath:    Imaging:    CXR:  reviewed  -------------------------------------------------------------------------------------------  Assessment and Plan:   67F late presenting STEMI, HFrEF (EF 36%) for follow up. Patient with borderline BP, asymptomatic.   Will try initiating entresto inpatient     1. STEMI: cath and viability study reviewed.   patient is currently chest pain free.   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 Discussed with Dr. Baron- plan for outpatient follow up consideration for revascularization and repeat viability study  - c/w Brilinta, ASA, heparin x48 hours and then stop.     2. HFrEF:   - Transition to Entresto 24/26 bid if BP tolerates. hold for SBP <85  - transition to toprol 12.5 xl daily   - patient hesitant to start SGLT2i inpatient and would defer this and MRA to outpatient.   - Given borderline blood pressure, would defer uptitration of BB and ARNI outpatient     DISPO- needs follow up with Dr. Martínez Baron (Cardiac Interventionalist) in 1 month and General cardiology within 1-2 week of discharge)    Konrad Aviles MD  Attending Physician   Cardiology Inpatient Consult Service     [facility] [place]   [address] [facility], [address] [place] [address]   Tel: [phone]  Fax: [phone]    Please check [email or web address]: [email or web address]" for cardiology service schedule and contact information.       Konrad Aviles MD  Attending Physician   Cardiology Inpatient Consult Service     [facility] [place]   [address] [facility], [address]  [place] [address]   Tel: [phone]  Fax: [phone]    Please check [email or web address]: [email or web address]" for cardiology service schedule and contact information.  Cardiology Progress Note  ------------------------------------------------------------------------------------------  SUBJECTIVE:   No events overnight. Denies CP, SOB or Palpitations.   -------------------------------------------------------------------------------------------  ROS:  CV: chest pain (-), palpitation (-), orthopnea (-), PND (-), edema (-)  PULM: SOB (-), cough (-), wheezing (-), hemoptysis (-).   CONST: fever (-), chills (-) or fatigue (-)  GI: abdominal distension (-), abdominal pain (-) , nausea/vomiting (-), hematemesis, (-), melena (-), hematochezia (-)  : dysuria (-), frequency (-), hematuria (-).   NEURO: numbness (-), weakness (-), dizziness (-)  MSK: myalgia (-), joint pain (-)   SKIN: itching (-), rash (-)  HEENT:  visual changes (-); vertigo or throat pain (-);  neck stiffness (-)   Psych: change in mood (-), anxiety (-), depression (-)     All other review of systems is negative unless indicated above.   -------------------------------------------------------------------------------------------  VS:  T(F): 98.1 (01-26), Max: 100.1 (01-25)  HR: 80 (01-26) (80 - 98)  BP: 88/52 (01-26) (87/52 - 93/59)  RR: 18 (01-26)  SpO2: 98% (01-26)  I&O's Summary    25 Jan 2023 07:01  -  26 Jan 2023 07:00  --------------------------------------------------------  IN: 240 mL / OUT: 0 mL / NET: 240 mL      ------------------------------------------------------------------------------------------  PHYSICAL EXAM:  GENERAL: NAD  HEAD:  Atraumatic, Normocephalic.  EYES: EOMI, PERRLA, conjunctiva and sclera clear.  ENT: Moist mucous membranes.  NECK: Supple, No JVD.  CHEST/LUNG: Clear to auscultation bilaterally; No rales, rhonchi, wheezing, or rubs. Unlabored respirations.  HEART: Regular rate and rhythm; No murmurs, rubs, or gallops.  ABDOMEN: Bowel sounds present; Soft, Nontender, Nondistended.   EXTREMITIES: No edema. 2+ Peripheral Pulses, brisk capillary refill. No clubbing or cyanosis.   PSYCH: Normal affect.  SKIN: No rashes or lesions.  -------------------------------------------------------------------------------------------  LABS:                          9.8    10.08 )-----------( 290      ( 26 Jan 2023 05:43 )             30.4     01-26    136  |  102  |  11  ----------------------------<  109<H>  3.6   |  21<L>  |  0.71    Ca    8.9      26 Jan 2023 05:43    TPro  6.5  /  Alb  2.7<L>  /  TBili  1.0  /  DBili  x   /  AST  96<H>  /  ALT  130<H>  /  AlkPhos  260<H>  01-26    PTT - ( 24 Jan 2023 11:31 )  PTT:71.4 sec  CARDIAC MARKERS ( 22 Jan 2023 04:14 )  3728 ng/L / x     / x     / 2013 U/L / x     / 94.2 ng/mL  CARDIAC MARKERS ( 22 Jan 2023 01:03 )  4215 ng/L / x     / x     / 2366 U/L / x     / 129.0 ng/mL  CARDIAC MARKERS ( 21 Jan 2023 17:18 )  3979 ng/L / x     / x     / 2941 U/L / x     / 233.1 ng/mL            -------------------------------------------------------------------------------------------  Meds:  aspirin  chewable 81 milliGRAM(s) Oral daily  atorvastatin 80 milliGRAM(s) Oral at bedtime  enoxaparin Injectable 40 milliGRAM(s) SubCutaneous every 24 hours  influenza  Vaccine (HIGH DOSE) 0.7 milliLiter(s) IntraMuscular once  metoprolol succinate ER 12.5 milliGRAM(s) Oral daily  sacubitril 24 mG/valsartan 26 mG 1 Tablet(s) Oral two times a day  ticagrelor 90 milliGRAM(s) Oral every 12 hours    -------------------------------------------------------------------------------------------  Cardiovascular Diagnostic Testing:    ECG:     Echo:     Stress Testing:    Cath:    Imaging:    CXR:  reviewed  -------------------------------------------------------------------------------------------  Assessment and Plan:   67F late presenting STEMI, HFrEF (EF 36%) for follow up. Patient with borderline BP, asymptomatic.     1. STEMI: cath and viability study reviewed.   patient is currently chest pain free.   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 Discussed with Dr. Baron- plan for outpatient follow up consideration for revascularization and repeat viability study  - c/w Brilinta, ASA, heparin x48 hours and then stop.   - ambulate patient     2. HFrEF:   - Transition to Entresto 24/26 bid if BP tolerates. hold for SBP <85  - transition to toprol 12.5 xl daily   - patient hesitant to start SGLT2i inpatient and would defer this and MRA to outpatient.   - Given borderline blood pressure, would defer uptitration of BB and ARNI outpatient     DISPO- needs follow up with Dr. Martínez Baron (Cardiac Interventionalist) in 1-2 week of discharge.     Konrad Aviles MD  Attending Physician   Cardiology Inpatient Consult Service     [facility] [place]   [address] [facility], [address]  [place] [address]   Tel: [phone]  Fax: [phone]    Please check [email or web address]: [email or web address] for cardiology service schedule and contact information.       Konrad Aviles MD  Attending Physician   Cardiology Inpatient Consult Service     [facility] [place]   [address] [facility], [address]  [place] [address]   Tel: [phone]  Fax: [phone]    Please check [email or web address] password: [email or web address]" for cardiology service schedule and contact information.  Cardiology Progress Note  ------------------------------------------------------------------------------------------  SUBJECTIVE:   No events overnight. Denies CP, SOB or Palpitations.   -------------------------------------------------------------------------------------------  ROS:  CV: chest pain (-), palpitation (-), orthopnea (-), PND (-), edema (-)  PULM: SOB (-), cough (-), wheezing (-), hemoptysis (-).   CONST: fever (-), chills (-) or fatigue (-)  GI: abdominal distension (-), abdominal pain (-) , nausea/vomiting (-), hematemesis, (-), melena (-), hematochezia (-)  : dysuria (-), frequency (-), hematuria (-).   NEURO: numbness (-), weakness (-), dizziness (-)  MSK: myalgia (-), joint pain (-)   SKIN: itching (-), rash (-)  HEENT:  visual changes (-); vertigo or throat pain (-);  neck stiffness (-)   Psych: change in mood (-), anxiety (-), depression (-)     All other review of systems is negative unless indicated above.   -------------------------------------------------------------------------------------------  VS:  T(F): 98.1 (01-26), Max: 100.1 (01-25)  HR: 80 (01-26) (80 - 98)  BP: 88/52 (01-26) (87/52 - 93/59)  RR: 18 (01-26)  SpO2: 98% (01-26)  I&O's Summary    25 Jan 2023 07:01  -  26 Jan 2023 07:00  --------------------------------------------------------  IN: 240 mL / OUT: 0 mL / NET: 240 mL      ------------------------------------------------------------------------------------------  PHYSICAL EXAM:  GENERAL: NAD  HEAD:  Atraumatic, Normocephalic.  EYES: EOMI, PERRLA, conjunctiva and sclera clear.  ENT: Moist mucous membranes.  NECK: Supple, No JVD.  CHEST/LUNG: Clear to auscultation bilaterally; No rales, rhonchi, wheezing, or rubs. Unlabored respirations.  HEART: Regular rate and rhythm; No murmurs, rubs, or gallops.  ABDOMEN: Bowel sounds present; Soft, Nontender, Nondistended.   EXTREMITIES: No edema. 2+ Peripheral Pulses, brisk capillary refill. No clubbing or cyanosis.   PSYCH: Normal affect.  SKIN: No rashes or lesions.  -------------------------------------------------------------------------------------------  LABS:                          9.8    10.08 )-----------( 290      ( 26 Jan 2023 05:43 )             30.4     01-26    136  |  102  |  11  ----------------------------<  109<H>  3.6   |  21<L>  |  0.71    Ca    8.9      26 Jan 2023 05:43    TPro  6.5  /  Alb  2.7<L>  /  TBili  1.0  /  DBili  x   /  AST  96<H>  /  ALT  130<H>  /  AlkPhos  260<H>  01-26    PTT - ( 24 Jan 2023 11:31 )  PTT:71.4 sec  CARDIAC MARKERS ( 22 Jan 2023 04:14 )  3728 ng/L / x     / x     / 2013 U/L / x     / 94.2 ng/mL  CARDIAC MARKERS ( 22 Jan 2023 01:03 )  4215 ng/L / x     / x     / 2366 U/L / x     / 129.0 ng/mL  CARDIAC MARKERS ( 21 Jan 2023 17:18 )  3979 ng/L / x     / x     / 2941 U/L / x     / 233.1 ng/mL            -------------------------------------------------------------------------------------------  Meds:  aspirin  chewable 81 milliGRAM(s) Oral daily  atorvastatin 80 milliGRAM(s) Oral at bedtime  enoxaparin Injectable 40 milliGRAM(s) SubCutaneous every 24 hours  influenza  Vaccine (HIGH DOSE) 0.7 milliLiter(s) IntraMuscular once  metoprolol succinate ER 12.5 milliGRAM(s) Oral daily  sacubitril 24 mG/valsartan 26 mG 1 Tablet(s) Oral two times a day  ticagrelor 90 milliGRAM(s) Oral every 12 hours    -------------------------------------------------------------------------------------------  Cardiovascular Diagnostic Testing:    ECG:     Echo:     Stress Testing:    Cath:    Imaging:    CXR:  reviewed  -------------------------------------------------------------------------------------------  Assessment and Plan:   67F late presenting STEMI, HFrEF (EF 36%) for follow up. Patient with borderline BP, asymptomatic.     1. STEMI: cath and viability study reviewed.   patient is currently chest pain free.   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 Discussed with Dr. Baron- plan for outpatient follow up consideration for revascularization and repeat viability study  - c/w Brilinta, ASA, heparin x48 hours and then stop.   - ambulate patient     2. HFrEF:   - Transition to Entresto 24/26 bid if BP tolerates. hold for SBP <85  - transition to toprol 12.5 xl daily   - patient hesitant to start SGLT2i inpatient and would defer this and MRA to outpatient.   - Given borderline blood pressure, would defer uptitration of BB and ARNI outpatient   - mild edema in ankle, may start Lasix 20 mg every other day    DISPO- needs follow up with Dr. Martínez Baron (Cardiac Interventionalist) in 1-2 week of discharge.     Konrad Aviles MD  Attending Physician   Cardiology Inpatient Consult Service     [facility] [place]   [address] [facility], [address]  [place] [address]   Tel: [phone]  Fax: [phone]    Please check [email or web address] password: [email or web address]" for cardiology service schedule and contact information.       Konrad Aviles MD  Attending Physician   Cardiology Inpatient Consult Service     [facility] [place]   [address] [facility], [address]  [place] [address]   Tel: [phone]  Fax: [phone]    Please check [email or web address] password: "[email or web address]" for cardiology service schedule and contact information.  Cardiology Progress Note  ------------------------------------------------------------------------------------------  SUBJECTIVE:   No events overnight. Denies CP, SOB or Palpitations.   -------------------------------------------------------------------------------------------  ROS:  CV: chest pain (-), palpitation (-), orthopnea (-), PND (-), edema (-)  PULM: SOB (-), cough (-), wheezing (-), hemoptysis (-).   CONST: fever (-), chills (-) or fatigue (-)  GI: abdominal distension (-), abdominal pain (-) , nausea/vomiting (-), hematemesis, (-), melena (-), hematochezia (-)  : dysuria (-), frequency (-), hematuria (-).   NEURO: numbness (-), weakness (-), dizziness (-)  MSK: myalgia (-), joint pain (-)   SKIN: itching (-), rash (-)  HEENT:  visual changes (-); vertigo or throat pain (-);  neck stiffness (-)   Psych: change in mood (-), anxiety (-), depression (-)     All other review of systems is negative unless indicated above.   -------------------------------------------------------------------------------------------  VS:  T(F): 98.1 (01-26), Max: 100.1 (01-25)  HR: 80 (01-26) (80 - 98)  BP: 88/52 (01-26) (87/52 - 93/59)  RR: 18 (01-26)  SpO2: 98% (01-26)  I&O's Summary    25 Jan 2023 07:01  -  26 Jan 2023 07:00  --------------------------------------------------------  IN: 240 mL / OUT: 0 mL / NET: 240 mL      ------------------------------------------------------------------------------------------  PHYSICAL EXAM:  GENERAL: NAD  HEAD:  Atraumatic, Normocephalic.  EYES: EOMI, PERRLA, conjunctiva and sclera clear.  ENT: Moist mucous membranes.  NECK: Supple, No JVD.  CHEST/LUNG: Clear to auscultation bilaterally; No rales, rhonchi, wheezing, or rubs. Unlabored respirations.  HEART: Regular rate and rhythm; No murmurs, rubs, or gallops.  ABDOMEN: Bowel sounds present; Soft, Nontender, Nondistended.   EXTREMITIES: No edema. 2+ Peripheral Pulses, brisk capillary refill. No clubbing or cyanosis.   PSYCH: Normal affect.  SKIN: No rashes or lesions.  -------------------------------------------------------------------------------------------  LABS:                          9.8    10.08 )-----------( 290      ( 26 Jan 2023 05:43 )             30.4     01-26    136  |  102  |  11  ----------------------------<  109<H>  3.6   |  21<L>  |  0.71    Ca    8.9      26 Jan 2023 05:43    TPro  6.5  /  Alb  2.7<L>  /  TBili  1.0  /  DBili  x   /  AST  96<H>  /  ALT  130<H>  /  AlkPhos  260<H>  01-26    PTT - ( 24 Jan 2023 11:31 )  PTT:71.4 sec  CARDIAC MARKERS ( 22 Jan 2023 04:14 )  3728 ng/L / x     / x     / 2013 U/L / x     / 94.2 ng/mL  CARDIAC MARKERS ( 22 Jan 2023 01:03 )  4215 ng/L / x     / x     / 2366 U/L / x     / 129.0 ng/mL  CARDIAC MARKERS ( 21 Jan 2023 17:18 )  3979 ng/L / x     / x     / 2941 U/L / x     / 233.1 ng/mL            -------------------------------------------------------------------------------------------  Meds:  aspirin  chewable 81 milliGRAM(s) Oral daily  atorvastatin 80 milliGRAM(s) Oral at bedtime  enoxaparin Injectable 40 milliGRAM(s) SubCutaneous every 24 hours  influenza  Vaccine (HIGH DOSE) 0.7 milliLiter(s) IntraMuscular once  metoprolol succinate ER 12.5 milliGRAM(s) Oral daily  sacubitril 24 mG/valsartan 26 mG 1 Tablet(s) Oral two times a day  ticagrelor 90 milliGRAM(s) Oral every 12 hours    -------------------------------------------------------------------------------------------  Cardiovascular Diagnostic Testing:    ECG:     Echo:     Stress Testing:    Cath:    Imaging:    CXR:  reviewed  -------------------------------------------------------------------------------------------  Assessment and Plan:   67F late presenting STEMI, HFrEF (EF 36%) for follow up. Patient with borderline BP, asymptomatic.     1. STEMI: cath and viability study reviewed.   patient is currently chest pain free.   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 Discussed with Dr. Baron- plan for outpatient follow up consideration for revascularization and repeat viability study  - c/w Brilinta, ASA and statin  - ambulate patient and assess for any symptoms     2. HFrEF:   - Transition to Entresto 24/26 bid if BP tolerates. hold for SBP <85  - transition to toprol 12.5 xl daily   - patient hesitant to start SGLT2i inpatient and would defer this and MRA to outpatient.   - Given borderline blood pressure, would defer uptitration of BB and ARNI outpatient   - mild edema in ankle, may start Lasix 20 mg every other day    DISPO- needs follow up with Dr. Martínez Baron (Cardiac Interventionalist) in 1-2 week of discharge.     Konrad Aviles MD  Attending Physician   Cardiology Inpatient Consult Service     [facility] [place]   [address] [facility], [address] [place] [address]   Tel: [phone]  Fax: [phone]    Please check [email or web address] password: "[email or web address]" for cardiology service schedule and contact information.       Konrad Aviles MD  Attending Physician   Cardiology Inpatient Consult Service     [facility] [place]   [address] [facility], [address] [place] [address]   Tel: [phone]  Fax: [phone]    Please check [email or web address] password: [email or web address]" for cardiology service schedule and contact information.

## 2023-01-27 ENCOUNTER — TRANSCRIPTION ENCOUNTER (OUTPATIENT)
Age: 68
End: 2023-01-27

## 2023-01-27 VITALS
HEART RATE: 99 BPM | RESPIRATION RATE: 18 BRPM | OXYGEN SATURATION: 98 % | DIASTOLIC BLOOD PRESSURE: 65 MMHG | TEMPERATURE: 97 F | SYSTOLIC BLOOD PRESSURE: 101 MMHG

## 2023-01-27 LAB
ALBUMIN SERPL ELPH-MCNC: 3.2 G/DL — LOW (ref 3.3–5)
ALP SERPL-CCNC: 312 U/L — HIGH (ref 40–120)
ALT FLD-CCNC: 131 U/L — HIGH (ref 10–45)
ANION GAP SERPL CALC-SCNC: 13 MMOL/L — SIGNIFICANT CHANGE UP (ref 5–17)
AST SERPL-CCNC: 106 U/L — HIGH (ref 10–40)
BILIRUB SERPL-MCNC: 1.5 MG/DL — HIGH (ref 0.2–1.2)
BUN SERPL-MCNC: 12 MG/DL — SIGNIFICANT CHANGE UP (ref 7–23)
CALCIUM SERPL-MCNC: 9.3 MG/DL — SIGNIFICANT CHANGE UP (ref 8.4–10.5)
CHLORIDE SERPL-SCNC: 100 MMOL/L — SIGNIFICANT CHANGE UP (ref 96–108)
CO2 SERPL-SCNC: 23 MMOL/L — SIGNIFICANT CHANGE UP (ref 22–31)
CREAT SERPL-MCNC: 0.73 MG/DL — SIGNIFICANT CHANGE UP (ref 0.5–1.3)
EGFR: 91 ML/MIN/1.73M2 — SIGNIFICANT CHANGE UP
GLUCOSE SERPL-MCNC: 129 MG/DL — HIGH (ref 70–99)
HCT VFR BLD CALC: 32.6 % — LOW (ref 34.5–45)
HGB BLD-MCNC: 10.4 G/DL — LOW (ref 11.5–15.5)
MCHC RBC-ENTMCNC: 28.5 PG — SIGNIFICANT CHANGE UP (ref 27–34)
MCHC RBC-ENTMCNC: 31.9 GM/DL — LOW (ref 32–36)
MCV RBC AUTO: 89.3 FL — SIGNIFICANT CHANGE UP (ref 80–100)
NRBC # BLD: 0 /100 WBCS — SIGNIFICANT CHANGE UP (ref 0–0)
PLATELET # BLD AUTO: 385 K/UL — SIGNIFICANT CHANGE UP (ref 150–400)
POTASSIUM SERPL-MCNC: 3.7 MMOL/L — SIGNIFICANT CHANGE UP (ref 3.5–5.3)
POTASSIUM SERPL-SCNC: 3.7 MMOL/L — SIGNIFICANT CHANGE UP (ref 3.5–5.3)
PROT SERPL-MCNC: 7.2 G/DL — SIGNIFICANT CHANGE UP (ref 6–8.3)
RBC # BLD: 3.65 M/UL — LOW (ref 3.8–5.2)
RBC # FLD: 14.1 % — SIGNIFICANT CHANGE UP (ref 10.3–14.5)
SODIUM SERPL-SCNC: 136 MMOL/L — SIGNIFICANT CHANGE UP (ref 135–145)
WBC # BLD: 11.36 K/UL — HIGH (ref 3.8–10.5)
WBC # FLD AUTO: 11.36 K/UL — HIGH (ref 3.8–10.5)

## 2023-01-27 PROCEDURE — 93458 L HRT ARTERY/VENTRICLE ANGIO: CPT

## 2023-01-27 PROCEDURE — 99233 SBSQ HOSP IP/OBS HIGH 50: CPT

## 2023-01-27 PROCEDURE — 82550 ASSAY OF CK (CPK): CPT

## 2023-01-27 PROCEDURE — 84100 ASSAY OF PHOSPHORUS: CPT

## 2023-01-27 PROCEDURE — C8929: CPT

## 2023-01-27 PROCEDURE — 36415 COLL VENOUS BLD VENIPUNCTURE: CPT

## 2023-01-27 PROCEDURE — 85730 THROMBOPLASTIN TIME PARTIAL: CPT

## 2023-01-27 PROCEDURE — 96375 TX/PRO/DX INJ NEW DRUG ADDON: CPT

## 2023-01-27 PROCEDURE — 85027 COMPLETE CBC AUTOMATED: CPT

## 2023-01-27 PROCEDURE — A9585: CPT

## 2023-01-27 PROCEDURE — 93005 ELECTROCARDIOGRAM TRACING: CPT

## 2023-01-27 PROCEDURE — 83880 ASSAY OF NATRIURETIC PEPTIDE: CPT

## 2023-01-27 PROCEDURE — C1769: CPT

## 2023-01-27 PROCEDURE — 85379 FIBRIN DEGRADATION QUANT: CPT

## 2023-01-27 PROCEDURE — 85610 PROTHROMBIN TIME: CPT

## 2023-01-27 PROCEDURE — 80061 LIPID PANEL: CPT

## 2023-01-27 PROCEDURE — 85520 HEPARIN ASSAY: CPT

## 2023-01-27 PROCEDURE — 75561 CARDIAC MRI FOR MORPH W/DYE: CPT

## 2023-01-27 PROCEDURE — 80053 COMPREHEN METABOLIC PANEL: CPT

## 2023-01-27 PROCEDURE — 86901 BLOOD TYPING SEROLOGIC RH(D): CPT

## 2023-01-27 PROCEDURE — 99285 EMERGENCY DEPT VISIT HI MDM: CPT

## 2023-01-27 PROCEDURE — 84443 ASSAY THYROID STIM HORMONE: CPT

## 2023-01-27 PROCEDURE — 84484 ASSAY OF TROPONIN QUANT: CPT

## 2023-01-27 PROCEDURE — 87637 SARSCOV2&INF A&B&RSV AMP PRB: CPT

## 2023-01-27 PROCEDURE — 83735 ASSAY OF MAGNESIUM: CPT

## 2023-01-27 PROCEDURE — 71275 CT ANGIOGRAPHY CHEST: CPT | Mod: MA

## 2023-01-27 PROCEDURE — 83036 HEMOGLOBIN GLYCOSYLATED A1C: CPT

## 2023-01-27 PROCEDURE — 86850 RBC ANTIBODY SCREEN: CPT

## 2023-01-27 PROCEDURE — 86900 BLOOD TYPING SEROLOGIC ABO: CPT

## 2023-01-27 PROCEDURE — 83605 ASSAY OF LACTIC ACID: CPT

## 2023-01-27 PROCEDURE — C1894: CPT

## 2023-01-27 PROCEDURE — 71046 X-RAY EXAM CHEST 2 VIEWS: CPT

## 2023-01-27 PROCEDURE — 99239 HOSP IP/OBS DSCHRG MGMT >30: CPT

## 2023-01-27 PROCEDURE — 96374 THER/PROPH/DIAG INJ IV PUSH: CPT

## 2023-01-27 PROCEDURE — 82553 CREATINE MB FRACTION: CPT

## 2023-01-27 PROCEDURE — C1887: CPT

## 2023-01-27 PROCEDURE — 85025 COMPLETE CBC W/AUTO DIFF WBC: CPT

## 2023-01-27 RX ORDER — ATORVASTATIN CALCIUM 80 MG/1
1 TABLET, FILM COATED ORAL
Qty: 30 | Refills: 0
Start: 2023-01-27 | End: 2023-02-25

## 2023-01-27 RX ORDER — METOPROLOL TARTRATE 50 MG
0.5 TABLET ORAL
Qty: 15 | Refills: 0
Start: 2023-01-27 | End: 2023-02-25

## 2023-01-27 RX ORDER — ASPIRIN/CALCIUM CARB/MAGNESIUM 324 MG
1 TABLET ORAL
Qty: 30 | Refills: 0
Start: 2023-01-27 | End: 2023-02-25

## 2023-01-27 RX ORDER — FUROSEMIDE 40 MG
1 TABLET ORAL
Qty: 15 | Refills: 0
Start: 2023-01-27 | End: 2023-02-25

## 2023-01-27 RX ADMIN — SACUBITRIL AND VALSARTAN 1 TABLET(S): 24; 26 TABLET, FILM COATED ORAL at 05:23

## 2023-01-27 RX ADMIN — ENOXAPARIN SODIUM 40 MILLIGRAM(S): 100 INJECTION SUBCUTANEOUS at 05:31

## 2023-01-27 RX ADMIN — Medication 81 MILLIGRAM(S): at 11:24

## 2023-01-27 RX ADMIN — Medication 12.5 MILLIGRAM(S): at 11:24

## 2023-01-27 RX ADMIN — TICAGRELOR 90 MILLIGRAM(S): 90 TABLET ORAL at 05:23

## 2023-01-27 NOTE — PROGRESS NOTE ADULT - ASSESSMENT
68 yo F with no known PMH presents to hospital with late STEMI and found with 100% occlusion of LAD, s/p cardiac viability study, currently medically managing.
66 yo F with no known PMH presents to hospital with late STEMI and found with 100% occlusion of LAD, s/p cardiac viability study, currently medically managing.
68 yo F with no known PMH presents to hospital with late STEMI and found with 100% occlusion of LAD, s/p cardiac viability study, currently medically managing.
63F with no known PMH presents to hospital with late presentation of ACS and found with 100% occlusion of LAD, now pending results of cardiac viability study
66 yo F with no known PMH presents to hospital with late STEMI and found with 100% occlusion of LAD, s/p cardiac viability study, currently medically managing.

## 2023-01-27 NOTE — PROGRESS NOTE ADULT - SUBJECTIVE AND OBJECTIVE BOX
Cardiology Progress Note  ------------------------------------------------------------------------------------------  SUBJECTIVE:   No events overnight. Denies CP, SOB or Palpitations.   -------------------------------------------------------------------------------------------  ROS:  CV: chest pain (-), palpitation (-), orthopnea (-), PND (-), edema (-)  PULM: SOB (-), cough (-), wheezing (-), hemoptysis (-).   CONST: fever (-), chills (-) or fatigue (-)  GI: abdominal distension (-), abdominal pain (-) , nausea/vomiting (-), hematemesis, (-), melena (-), hematochezia (-)  : dysuria (-), frequency (-), hematuria (-).   NEURO: numbness (-), weakness (-), dizziness (-)  MSK: myalgia (-), joint pain (-)   SKIN: itching (-), rash (-)  HEENT:  visual changes (-); vertigo or throat pain (-);  neck stiffness (-)   Psych: change in mood (-), anxiety (-), depression (-)     All other review of systems is negative unless indicated above.   -------------------------------------------------------------------------------------------  VS:  T(F): 98 (01-27), Max: 98.4 (01-26)  HR: 96 (01-27) (88 - 105)  BP: 101/60 (01-27) (84/50 - 906/-)  RR: 18 (01-27)  SpO2: 97% (01-27)  I&O's Summary    26 Jan 2023 07:01 - 27 Jan 2023 07:00  --------------------------------------------------------  IN: 260 mL / OUT: 0 mL / NET: 260 mL    27 Jan 2023 07:01 - 27 Jan 2023 10:14  --------------------------------------------------------  IN: 120 mL / OUT: 0 mL / NET: 120 mL      ------------------------------------------------------------------------------------------  PHYSICAL EXAM:  GENERAL: NAD  HEAD:  Atraumatic, Normocephalic.  EYES: EOMI, PERRLA, conjunctiva and sclera clear.  ENT: Moist mucous membranes.  NECK: Supple, No JVD.  CHEST/LUNG: Clear to auscultation bilaterally; No rales, rhonchi, wheezing, or rubs. Unlabored respirations.  HEART: Regular rate and rhythm; No murmurs, rubs, or gallops.  ABDOMEN: Bowel sounds present; Soft, Nontender, Nondistended.   EXTREMITIES: No edema. 2+ Peripheral Pulses, brisk capillary refill. No clubbing or cyanosis.   PSYCH: Normal affect.  SKIN: No rashes or lesions.  -------------------------------------------------------------------------------------------  LABS:                          10.4   11.36 )-----------( 385      ( 27 Jan 2023 09:23 )             32.6     01-27    136  |  100  |  12  ----------------------------<  129<H>  3.7   |  23  |  0.73    Ca    9.3      27 Jan 2023 09:23    TPro  7.2  /  Alb  3.2<L>  /  TBili  1.5<H>  /  DBili  x   /  AST  106<H>  /  ALT  131<H>  /  AlkPhos  312<H>  01-27      CARDIAC MARKERS ( 22 Jan 2023 04:14 )  3728 ng/L / x     / x     / 2013 U/L / x     / 94.2 ng/mL  CARDIAC MARKERS ( 22 Jan 2023 01:03 )  4215 ng/L / x     / x     / 2366 U/L / x     / 129.0 ng/mL  CARDIAC MARKERS ( 21 Jan 2023 17:18 )  3979 ng/L / x     / x     / 2941 U/L / x     / 233.1 ng/mL            -------------------------------------------------------------------------------------------  Meds:  aspirin  chewable 81 milliGRAM(s) Oral daily  atorvastatin 80 milliGRAM(s) Oral at bedtime  enoxaparin Injectable 40 milliGRAM(s) SubCutaneous every 24 hours  influenza  Vaccine (HIGH DOSE) 0.7 milliLiter(s) IntraMuscular once  metoprolol succinate ER 12.5 milliGRAM(s) Oral daily  sacubitril 24 mG/valsartan 26 mG 1 Tablet(s) Oral two times a day  ticagrelor 90 milliGRAM(s) Oral every 12 hours    -------------------------------------------------------------------------------------------  Cardiovascular Diagnostic Testing:    ECG:     Echo:     Stress Testing:    Cath:    Imaging:    CXR:  reviewed  -------------------------------------------------------------------------------------------  Assessment and Plan:   67F late presenting STEMI, HFrEF (EF 36%) for follow up. Patient with borderline BP, asymptomatic.     1. STEMI: cath and viability study reviewed.   patient is currently chest pain free.   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 Discussed with Dr. Baron- plan for outpatient follow up consideration for revascularization and repeat viability study  - c/w Brilinta, ASA and statin    2. HFrEF:   - Transition to Entresto 24/26 bid if BP tolerates. hold for SBP <85  - transition to toprol 12.5 xl daily   - patient hesitant to start SGLT2i inpatient and would defer this and MRA to outpatient.   - Given borderline blood pressure, would defer uptitration of BB and ARNI outpatient   - mild edema in ankle, may start Lasix 20 mg every other day    DISPO- needs follow up with Dr. Martínez Baron (Cardiac Interventionalist) in 1-2 week of discharge.         Konrad Aviles MD  Attending Physician   Cardiology Inpatient Consult Service     [facility] [place]   [address], [address]  [place] [address]   Tel: [phone]  Fax: [phone]    Please check [email or web address] for cardiology service schedule and contact information.

## 2023-01-27 NOTE — PROGRESS NOTE ADULT - PROBLEM SELECTOR PLAN 1
late presentation of MI. [facility] with 100% stenosis of LAD w/ distal collaterals S/P viability study  EF 36% per imaging  - c/w entresto low dose, metoprolol xl 12.5 mg po qday. TITRATING as BP allows given hypotension SBP 80s-90s.  - Holding lasix currently, likely will need 20 mg every other day on discharge  - Continue with ASA 81 mg PO Qd and brilinta 90 mg PO BID for DAPT, atorvastatin 80 mg PO Qd

## 2023-01-27 NOTE — PROGRESS NOTE ADULT - PROBLEM SELECTOR PLAN 2
- as above, cardiology following
- Continue with metoprolol tartrate 12.g mg PO BID  - Continue with atorvastatin 80 mg PO Qd
- as above, cardiology following

## 2023-01-27 NOTE — PROGRESS NOTE ADULT - PROVIDER SPECIALTY LIST ADULT
Cardiology
Hospitalist
SHAY
Cardiology
SHAY
Internal Medicine
Hospitalist
Hospitalist
Internal Medicine

## 2023-01-27 NOTE — PROGRESS NOTE ADULT - TIME BILLING
coordination of care with hospitalist and interventional cardiologist
discussion with hospitalist, and patient's family at bedside
coordination of care

## 2023-01-27 NOTE — PROGRESS NOTE ADULT - SUBJECTIVE AND OBJECTIVE BOX
[facility] Division of Hospital Medicine  Janice Moss MD  Pager (M-F, 0A-0N): [phone]  Other Times:  [phone]    Patient is a 67y old  Female who presents with a chief complaint of stemi, chest pain (26 Jan 2023 12:41)      SUBJECTIVE / OVERNIGHT EVENTS:  feeling well. denies any additional CP or SOB. denies any dizziness or lightheadedness when walking.     ADDITIONAL REVIEW OF SYSTEMS: otherwise neg    MEDICATIONS  (STANDING):  aspirin  chewable 81 milliGRAM(s) Oral daily  atorvastatin 80 milliGRAM(s) Oral at bedtime  enoxaparin Injectable 40 milliGRAM(s) SubCutaneous every 24 hours  influenza  Vaccine (HIGH DOSE) 0.7 milliLiter(s) IntraMuscular once  metoprolol succinate ER 12.5 milliGRAM(s) Oral daily  sacubitril 24 mG/valsartan 26 mG 1 Tablet(s) Oral two times a day  ticagrelor 90 milliGRAM(s) Oral every 12 hours    MEDICATIONS  (PRN):      CAPILLARY BLOOD GLUCOSE        I&O's Summary    26 Jan 2023 07:01  -  27 Jan 2023 07:00  --------------------------------------------------------  IN: 260 mL / OUT: 0 mL / NET: 260 mL    27 Jan 2023 07:01  -  27 Jan 2023 13:15  --------------------------------------------------------  IN: 120 mL / OUT: 0 mL / NET: 120 mL        PHYSICAL EXAM:  Vital Signs Last 24 Hrs  T(C): 36.2 (27 Jan 2023 11:30), Max: 36.9 (26 Jan 2023 20:45)  T(F): 97.2 (27 Jan 2023 11:30), Max: 98.4 (26 Jan 2023 20:45)  HR: 99 (27 Jan 2023 11:30) (96 - 105)  BP: 101/65 (27 Jan 2023 11:30) (84/50 - 906/-)  BP(mean): --  RR: 18 (27 Jan 2023 11:30) (18 - 20)  SpO2: 98% (27 Jan 2023 11:30) (96% - 99%)    Parameters below as of 27 Jan 2023 11:30  Patient On (Oxygen Delivery Method): room air        CONSTITUTIONAL: NAD, well-groomed  EYES:  conjunctiva and sclera clear  ENMT: Moist oral mucosa  NECK: Supple, no palpable masses; no JVD  RESPIRATORY: Normal respiratory effort; lungs are clear to auscultation bilaterally  CARDIOVASCULAR: Regular rate and rhythm, normal S1 and S2, no murmur/rub/gallop; No lower extremity edema  ABDOMEN: Nontender to palpation, normoactive bowel sounds, no rebound/guarding  MUSCULOSKELETAL:  no clubbing or cyanosis of digits; no joint swelling or tenderness to palpation  PSYCH: A+O to person, place, and time; affect appropriate  SKIN: No rashes; no palpable lesions    LABS:                        10.4   11.36 )-----------( 385      ( 27 Jan 2023 09:23 )             32.6     01-27    136  |  100  |  12  ----------------------------<  129<H>  3.7   |  23  |  0.73    Ca    9.3      27 Jan 2023 09:23    TPro  7.2  /  Alb  3.2<L>  /  TBili  1.5<H>  /  DBili  x   /  AST  106<H>  /  ALT  131<H>  /  AlkPhos  312<H>  01-27                RADIOLOGY & ADDITIONAL TESTS:  Results Reviewed:   Imaging Personally Reviewed:  Electrocardiogram Personally Reviewed:    COORDINATION OF CARE:  Care Discussed with Consultants/Other Providers [Y/N]:  Prior or Outpatient Records Reviewed [Y/N]:

## 2023-01-27 NOTE — DISCHARGE NOTE NURSING/CASE MANAGEMENT/SOCIAL WORK - NSDCPEFALRISK_GEN_ALL_CORE
For information on Fall & Injury Prevention, visit: [email or web address] OR  [email or web address] OR  https://www.cdc.gov/steadi/patient.html

## 2023-01-27 NOTE — DISCHARGE NOTE NURSING/CASE MANAGEMENT/SOCIAL WORK - PATIENT PORTAL LINK FT
You can access the FollowMyHealth Patient Portal offered by [facility] by registering at the following website: [email or web address]. By joining [facility]’s FollowMyHealth portal, you will also be able to view your health information using other applications (apps) compatible with our system.

## 2023-01-27 NOTE — PROGRESS NOTE ADULT - PROBLEM SELECTOR PLAN 3
DVT ppx -   lovenox qd   -PT/ambulate/oob to chair.   Dispo: Monitor BP today and plan for Home 1/27 after medication titration w/ outpt follow up with cardiology - Dr Martínez Baron in 1 month, and general cardiology 1 week  D/W daughter at bedside, all questions answered at length
DVT ppx - on heparin gtts
DVT ppx - switch to lovenox qd   no PT needs  Dispo: Home likely 1/26 after medication titration w/ outpt follow up with cardiology  D/W daughter at bedside, all ques answered
DVT ppx -   lovenox qd   -PT/ambulate/oob to chair.   Dispo: Home likely 1/26 after medication titration w/ outpt follow up with cardiology  -Update 1/25: extensive discussion bedside with patient and her daughter and Dr. Aviles. -patient/daughter don't want farxiga. Willing to try entresto and metoprolol lowest doses without hold parameters to assess tolerability. thus far hasn't been getting many/any doses due to low BP. However, not symptomatic from low BP. need to assess for any symptoms with movement, etc.
DVT ppx -   lovenox qd   -PT/ambulate/oob to chair.   Dispo: d/c home today  D/W daughter at bedside, all questions answered at length

## 2023-02-01 ENCOUNTER — APPOINTMENT (OUTPATIENT)
Dept: CARDIOLOGY | Facility: CLINIC | Age: 68
End: 2023-02-01
Payer: COMMERCIAL

## 2023-02-01 ENCOUNTER — NON-APPOINTMENT (OUTPATIENT)
Age: 68
End: 2023-02-01

## 2023-02-01 VITALS
BODY MASS INDEX: 28.09 KG/M2 | WEIGHT: 174.8 LBS | HEIGHT: 66 IN | DIASTOLIC BLOOD PRESSURE: 81 MMHG | OXYGEN SATURATION: 99 % | HEART RATE: 103 BPM | SYSTOLIC BLOOD PRESSURE: 106 MMHG

## 2023-02-01 DIAGNOSIS — I21.09 ST ELEVATION (STEMI) MYOCARDIAL INFARCTION INVOLVING OTHER CORONARY ARTERY OF ANTERIOR WALL: ICD-10-CM

## 2023-02-01 DIAGNOSIS — Z82.49 FAMILY HISTORY OF ISCHEMIC HEART DISEASE AND OTHER DISEASES OF THE CIRCULATORY SYSTEM: ICD-10-CM

## 2023-02-01 DIAGNOSIS — Z78.9 OTHER SPECIFIED HEALTH STATUS: ICD-10-CM

## 2023-02-01 DIAGNOSIS — R12 HEARTBURN: ICD-10-CM

## 2023-02-01 PROCEDURE — 93000 ELECTROCARDIOGRAM COMPLETE: CPT

## 2023-02-01 PROCEDURE — 99215 OFFICE O/P EST HI 40 MIN: CPT | Mod: 25

## 2023-02-03 ENCOUNTER — NON-APPOINTMENT (OUTPATIENT)
Age: 68
End: 2023-02-03

## 2023-02-03 PROBLEM — Z78.9 NEVER SMOKED TOBACCO: Status: ACTIVE | Noted: 2023-02-03

## 2023-02-03 PROBLEM — Z82.49 FAMILY HISTORY OF CORONARY ARTERY DISEASE: Status: ACTIVE | Noted: 2023-02-03

## 2023-02-03 NOTE — PHYSICAL EXAM
[Well Developed] : well developed [Well Nourished] : well nourished [No Acute Distress] : no acute distress [Normal Conjunctiva] : normal conjunctiva [Normal Venous Pressure] : normal venous pressure [No Carotid Bruit] : no carotid bruit [No Murmur] : no murmur [No Rub] : no rub [No Gallop] : no gallop [Clear Lung Fields] : clear lung fields [Good Air Entry] : good air entry [No Respiratory Distress] : no respiratory distress  [Soft] : abdomen soft [Non Tender] : non-tender [Normal Gait] : normal gait [No Cyanosis] : no cyanosis [No Rash] : no rash [No Skin Lesions] : no skin lesions [Moves all extremities] : moves all extremities [No Focal Deficits] : no focal deficits [Normal Speech] : normal speech [Alert and Oriented] : alert and oriented [de-identified] : tachycardic [de-identified] : bilateral trace pitting pedal edema

## 2023-02-03 NOTE — DISCUSSION/SUMMARY
[Coronary Artery Disease] : coronary artery disease [Systolic Heart Failure] : systolic heart failure [Stable] : stable [Compensated] : compensated [FreeTextEntry1] : \par Currently stable from a cardiovascular standpoint. Normotensive. Systolic heart failure secondary to ischemic cardiomyopathy (recent late presentation anterior MI, LVEF 36-41%). Appears to be in mild volume overload. Shortness of breath likely secondary to volume status. No chest pain. Advised patient to take furosemide daily. Will try pantoprazole for stomach symptoms. Continue all other current medications. Patient to retry atorvastatin after starting PPI. ECG completed today and reviewed (findings as noted above). Hospital records and recent cardiac test results reviewed. Daily weights. Follow up in 1 month. Patient to call in next few days with update on clinical status with daily furosemide. [EKG obtained to assist in diagnosis and management of assessed problem(s)] : EKG obtained to assist in diagnosis and management of assessed problem(s)

## 2023-02-03 NOTE — HISTORY OF PRESENT ILLNESS
[FreeTextEntry1] : Recently admitted to [facility] with late presentation anterior STEMI. Found to have occluded proximal LAD. Since discharge, she has been doing okay. Denies chest pain or palpitations. Has noted some edema in her feet. Has felt some shortness of breath when lying flat. Also notes that her stomach bothers her in the evenings especially after taking atorvastatin which she stopped. Denies dizziness or lightheadedness. Appetite has not been great. Due to low BP at home, she has not started Entresto.

## 2023-02-03 NOTE — REVIEW OF SYSTEMS
[Dyspnea on exertion] : dyspnea during exertion [Lower Ext Edema] : lower extremity edema [Orthopnea] : orthopnea [Negative] : Musculoskeletal [Chest Discomfort] : no chest discomfort [Leg Claudication] : no intermittent leg claudication [Palpitations] : no palpitations [PND] : no PND [Syncope] : no syncope

## 2023-02-03 NOTE — CARDIOLOGY SUMMARY
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\par  [de-identified] : \par 01/23/23 (MRI viability) - image quality markedly degraded due to motion artifact, in particular, \par the late gadolinium enhancement sequences are limited due to motion; transmural scarring involving the mid anterior wall of the left  ventricle; there is likely microvascular obstruction involving the left ventricular apical anterior wall and majority of the septal wall, LVEF 36%\par  [de-identified] : \par 01/22/23 (CATH) - pLAD 100% (faint collaterals from RPDA), mCx 50%, pOM2 40%, mRCA 20%

## 2023-02-07 ENCOUNTER — NON-APPOINTMENT (OUTPATIENT)
Age: 68
End: 2023-02-07

## 2023-02-08 PROBLEM — Z00.00 ENCOUNTER FOR PREVENTIVE HEALTH EXAMINATION: Noted: 2023-02-08

## 2023-02-10 RX ORDER — TICAGRELOR 90 MG/1
90 TABLET ORAL TWICE DAILY
Qty: 180 | Refills: 3 | Status: DISCONTINUED | COMMUNITY
Start: 1900-01-01 | End: 2023-02-10

## 2023-02-11 ENCOUNTER — INPATIENT (INPATIENT)
Facility: HOSPITAL | Age: 68
LOS: 9 days | Discharge: HOME HEALTH | DRG: 282 | End: 2023-02-21
Attending: HOSPITALIST | Admitting: HOSPITALIST
Payer: COMMERCIAL

## 2023-02-11 VITALS
DIASTOLIC BLOOD PRESSURE: 84 MMHG | OXYGEN SATURATION: 100 % | HEART RATE: 120 BPM | SYSTOLIC BLOOD PRESSURE: 130 MMHG | WEIGHT: 149.91 LBS | HEIGHT: 65 IN | TEMPERATURE: 98 F | RESPIRATION RATE: 22 BRPM

## 2023-02-11 DIAGNOSIS — Z29.9 ENCOUNTER FOR PROPHYLACTIC MEASURES, UNSPECIFIED: ICD-10-CM

## 2023-02-11 DIAGNOSIS — I50.9 HEART FAILURE, UNSPECIFIED: ICD-10-CM

## 2023-02-11 DIAGNOSIS — R10.9 UNSPECIFIED ABDOMINAL PAIN: ICD-10-CM

## 2023-02-11 DIAGNOSIS — R00.0 TACHYCARDIA, UNSPECIFIED: ICD-10-CM

## 2023-02-11 DIAGNOSIS — I25.2 OLD MYOCARDIAL INFARCTION: ICD-10-CM

## 2023-02-11 LAB
ALBUMIN SERPL ELPH-MCNC: 3.6 G/DL — SIGNIFICANT CHANGE UP (ref 3.3–5)
ALP SERPL-CCNC: 161 U/L — HIGH (ref 40–120)
ALT FLD-CCNC: 16 U/L — SIGNIFICANT CHANGE UP (ref 10–45)
ANION GAP SERPL CALC-SCNC: 11 MMOL/L — SIGNIFICANT CHANGE UP (ref 5–17)
AST SERPL-CCNC: 11 U/L — SIGNIFICANT CHANGE UP (ref 10–40)
BASE EXCESS BLDV CALC-SCNC: -0.1 MMOL/L — SIGNIFICANT CHANGE UP (ref -2–3)
BASOPHILS # BLD AUTO: 0.06 K/UL — SIGNIFICANT CHANGE UP (ref 0–0.2)
BASOPHILS NFR BLD AUTO: 0.5 % — SIGNIFICANT CHANGE UP (ref 0–2)
BILIRUB SERPL-MCNC: 0.5 MG/DL — SIGNIFICANT CHANGE UP (ref 0.2–1.2)
BUN SERPL-MCNC: 12 MG/DL — SIGNIFICANT CHANGE UP (ref 7–23)
CA-I SERPL-SCNC: 1.18 MMOL/L — SIGNIFICANT CHANGE UP (ref 1.15–1.33)
CALCIUM SERPL-MCNC: 9.4 MG/DL — SIGNIFICANT CHANGE UP (ref 8.4–10.5)
CHLORIDE BLDV-SCNC: 105 MMOL/L — SIGNIFICANT CHANGE UP (ref 96–108)
CHLORIDE SERPL-SCNC: 105 MMOL/L — SIGNIFICANT CHANGE UP (ref 96–108)
CO2 BLDV-SCNC: 27 MMOL/L — HIGH (ref 22–26)
CO2 SERPL-SCNC: 26 MMOL/L — SIGNIFICANT CHANGE UP (ref 22–31)
CREAT SERPL-MCNC: 0.77 MG/DL — SIGNIFICANT CHANGE UP (ref 0.5–1.3)
EGFR: 85 ML/MIN/1.73M2 — SIGNIFICANT CHANGE UP
EOSINOPHIL # BLD AUTO: 1.18 K/UL — HIGH (ref 0–0.5)
EOSINOPHIL NFR BLD AUTO: 10.5 % — HIGH (ref 0–6)
FLUAV AG NPH QL: SIGNIFICANT CHANGE UP
FLUBV AG NPH QL: SIGNIFICANT CHANGE UP
GAS PNL BLDV: 136 MMOL/L — SIGNIFICANT CHANGE UP (ref 136–145)
GAS PNL BLDV: SIGNIFICANT CHANGE UP
GAS PNL BLDV: SIGNIFICANT CHANGE UP
GLUCOSE BLDV-MCNC: 95 MG/DL — SIGNIFICANT CHANGE UP (ref 70–99)
GLUCOSE SERPL-MCNC: 106 MG/DL — HIGH (ref 70–99)
HCO3 BLDV-SCNC: 26 MMOL/L — SIGNIFICANT CHANGE UP (ref 22–29)
HCT VFR BLD CALC: 37.9 % — SIGNIFICANT CHANGE UP (ref 34.5–45)
HCT VFR BLDA CALC: 36 % — SIGNIFICANT CHANGE UP (ref 34.5–46.5)
HGB BLD CALC-MCNC: 11.9 G/DL — SIGNIFICANT CHANGE UP (ref 11.7–16.1)
HGB BLD-MCNC: 11.5 G/DL — SIGNIFICANT CHANGE UP (ref 11.5–15.5)
IMM GRANULOCYTES NFR BLD AUTO: 0.4 % — SIGNIFICANT CHANGE UP (ref 0–0.9)
LACTATE BLDV-MCNC: 2.3 MMOL/L — HIGH (ref 0.5–2)
LYMPHOCYTES # BLD AUTO: 1.54 K/UL — SIGNIFICANT CHANGE UP (ref 1–3.3)
LYMPHOCYTES # BLD AUTO: 13.7 % — SIGNIFICANT CHANGE UP (ref 13–44)
MCHC RBC-ENTMCNC: 27.8 PG — SIGNIFICANT CHANGE UP (ref 27–34)
MCHC RBC-ENTMCNC: 30.3 GM/DL — LOW (ref 32–36)
MCV RBC AUTO: 91.8 FL — SIGNIFICANT CHANGE UP (ref 80–100)
MONOCYTES # BLD AUTO: 0.66 K/UL — SIGNIFICANT CHANGE UP (ref 0–0.9)
MONOCYTES NFR BLD AUTO: 5.9 % — SIGNIFICANT CHANGE UP (ref 2–14)
NEUTROPHILS # BLD AUTO: 7.73 K/UL — HIGH (ref 1.8–7.4)
NEUTROPHILS NFR BLD AUTO: 69 % — SIGNIFICANT CHANGE UP (ref 43–77)
NRBC # BLD: 0 /100 WBCS — SIGNIFICANT CHANGE UP (ref 0–0)
PCO2 BLDV: 47 MMHG — HIGH (ref 39–42)
PH BLDV: 7.35 — SIGNIFICANT CHANGE UP (ref 7.32–7.43)
PLATELET # BLD AUTO: 631 K/UL — HIGH (ref 150–400)
PO2 BLDV: 19 MMHG — LOW (ref 25–45)
POTASSIUM BLDV-SCNC: 5.9 MMOL/L — HIGH (ref 3.5–5.1)
POTASSIUM SERPL-MCNC: 4 MMOL/L — SIGNIFICANT CHANGE UP (ref 3.5–5.3)
POTASSIUM SERPL-SCNC: 4 MMOL/L — SIGNIFICANT CHANGE UP (ref 3.5–5.3)
PROT SERPL-MCNC: 7.9 G/DL — SIGNIFICANT CHANGE UP (ref 6–8.3)
RBC # BLD: 4.13 M/UL — SIGNIFICANT CHANGE UP (ref 3.8–5.2)
RBC # FLD: 14.9 % — HIGH (ref 10.3–14.5)
RSV RNA NPH QL NAA+NON-PROBE: SIGNIFICANT CHANGE UP
SAO2 % BLDV: 19 % — LOW (ref 67–88)
SARS-COV-2 RNA SPEC QL NAA+PROBE: SIGNIFICANT CHANGE UP
SODIUM SERPL-SCNC: 142 MMOL/L — SIGNIFICANT CHANGE UP (ref 135–145)
TROPONIN T, HIGH SENSITIVITY RESULT: 303 NG/L — HIGH (ref 0–51)
WBC # BLD: 11.22 K/UL — HIGH (ref 3.8–10.5)
WBC # FLD AUTO: 11.22 K/UL — HIGH (ref 3.8–10.5)

## 2023-02-11 PROCEDURE — 99223 1ST HOSP IP/OBS HIGH 75: CPT

## 2023-02-11 PROCEDURE — 99223 1ST HOSP IP/OBS HIGH 75: CPT | Mod: GC

## 2023-02-11 PROCEDURE — 71045 X-RAY EXAM CHEST 1 VIEW: CPT | Mod: 26

## 2023-02-11 PROCEDURE — 99285 EMERGENCY DEPT VISIT HI MDM: CPT

## 2023-02-11 RX ORDER — METOPROLOL TARTRATE 50 MG
12.5 TABLET ORAL DAILY
Refills: 0 | Status: DISCONTINUED | OUTPATIENT
Start: 2023-02-11 | End: 2023-02-11

## 2023-02-11 RX ORDER — LANOLIN ALCOHOL/MO/W.PET/CERES
3 CREAM (GRAM) TOPICAL AT BEDTIME
Refills: 0 | Status: DISCONTINUED | OUTPATIENT
Start: 2023-02-11 | End: 2023-02-21

## 2023-02-11 RX ORDER — METOPROLOL TARTRATE 50 MG
12.5 TABLET ORAL DAILY
Refills: 0 | Status: DISCONTINUED | OUTPATIENT
Start: 2023-02-11 | End: 2023-02-12

## 2023-02-11 RX ORDER — PANTOPRAZOLE SODIUM 20 MG/1
40 TABLET, DELAYED RELEASE ORAL
Refills: 0 | Status: DISCONTINUED | OUTPATIENT
Start: 2023-02-11 | End: 2023-02-21

## 2023-02-11 RX ORDER — ATORVASTATIN CALCIUM 80 MG/1
80 TABLET, FILM COATED ORAL AT BEDTIME
Refills: 0 | Status: DISCONTINUED | OUTPATIENT
Start: 2023-02-11 | End: 2023-02-13

## 2023-02-11 RX ORDER — SACUBITRIL AND VALSARTAN 24; 26 MG/1; MG/1
1 TABLET, FILM COATED ORAL
Refills: 0 | Status: DISCONTINUED | OUTPATIENT
Start: 2023-02-11 | End: 2023-02-11

## 2023-02-11 RX ORDER — ENOXAPARIN SODIUM 100 MG/ML
40 INJECTION SUBCUTANEOUS EVERY 24 HOURS
Refills: 0 | Status: DISCONTINUED | OUTPATIENT
Start: 2023-02-11 | End: 2023-02-13

## 2023-02-11 RX ORDER — SACUBITRIL AND VALSARTAN 24; 26 MG/1; MG/1
1 TABLET, FILM COATED ORAL
Refills: 0 | Status: DISCONTINUED | OUTPATIENT
Start: 2023-02-11 | End: 2023-02-12

## 2023-02-11 RX ORDER — ACETAMINOPHEN 500 MG
650 TABLET ORAL EVERY 6 HOURS
Refills: 0 | Status: DISCONTINUED | OUTPATIENT
Start: 2023-02-11 | End: 2023-02-21

## 2023-02-11 RX ADMIN — Medication 12.5 MILLIGRAM(S): at 19:04

## 2023-02-11 NOTE — H&P ADULT - ATTENDING COMMENTS
patient is a 67 year old female with past medical history of STEMI 2 weeks prior (admitted 1/21) when she was found to have 100% occlusion of LAD, s/p viability study, med treated, with plan for outpatient follow up appointment with interventional cards, started on lasix, which Lasix was stopped due to urticarial reaction, presented with dyspnea, nausea, diaphoresis. labs indicating BNP 10k, chest xray with small right pleural effusion. s/p metolazone in the ED. patient still with dyspnea, abdominal discomfort, heart burn, poor appetite, early satiety.     chart reviewed for past admissions, outpatient notes, labs, imaging, consult notes.    #HFrEF, EF 41% per Echo 1/22, 36% per cath 1/23  #CAD  #dyspepsia     - diurese further with ethacrynic acid 50mg IV daily starting tomorrow or sooner depending on volume status on re-assessment.   monitor bmp, keep K>4, Mg>2. strict Is/Os. daily standing weights.   - continue entresto, beta blocker with hold parameter SBP <90.  - continue DAPT: ASA. start plavix (switched from Brilinta per cardiology), statin. f/u cardiology regarding plan for revascularization and repeat viability study.  - telemonitoring. incentive spirometry, encourage activity as tolerated. fall precautions.   - re-asses abdominal symptoms after improvement in volume status. would consider GI eval inpatient vs outpatient, EGD. PPI.    #mild leukocytosis, mild thrombocytosis - unclear significance, ?2/2 stress. trend wbc. monitor for s/s of infection. patient is a 67 year old female with past medical history of STEMI 2 weeks prior (admitted 1/21) when she was found to have 100% occlusion of LAD, s/p viability study, med treated, with plan for outpatient follow up appointment with interventional cards, started on lasix, which Lasix was stopped due to urticarial reaction, presented with dyspnea, nausea, diaphoresis. labs indicating BNP 10k, chest xray with small right pleural effusion. s/p metolazone in the ED. patient still with dyspnea, abdominal discomfort, heart burn, poor appetite, early satiety.     chart reviewed for past admissions, outpatient notes, labs, imaging, consult notes.    #acute HFrEF, EF 41% per Echo 1/22, 36% per cath 1/23  #CAD  #dyspepsia     - diurese further with ethacrynic acid 50mg IV daily starting tomorrow or sooner depending on volume status on re-assessment.   monitor bmp, keep K>4, Mg>2. strict Is/Os. daily standing weights.   - continue entresto, beta blocker with hold parameter SBP <90.  - continue DAPT: ASA. start plavix (switched from Brilinta per cardiology), statin. f/u cardiology regarding plan for revascularization and repeat viability study.  - telemonitoring. incentive spirometry, encourage activity as tolerated. fall precautions.   - re-asses abdominal symptoms after improvement in volume status/stopping brilinta. would consider GI eval inpatient vs outpatient, EGD. PPI.    #mild leukocytosis, mild thrombocytosis - unclear significance, ?2/2 stress. trend wbc. monitor for s/s of infection.

## 2023-02-11 NOTE — ED PROVIDER NOTE - ATTENDING CONTRIBUTION TO CARE
RGUJRAL 67-year-old female with recent admission for a STEMI status post cardiac cath presents with dyspnea on exertion, orthopnea and nausea since discharge.  Patient had a cardiac cath that showed 100% LAD with collateral flow and was treated with medical management.  Patient followed up outpatient with Dr. Martínez Baron and was started on Lasix and developed a rash with concern for sulfur allergy and was stopped.  Patient's rash has since resolved.  Patient now endorses persistent nausea decreased p.o. intake and appetite and orthopnea.  Denies any chest pain.  On exam patient appears slightly tachypneic lungs with Rales bibasilar.  Abdomen is soft nontender and no lower extremity edema.  EKG reviewed noted to be tachycardic patient did not take her metoprolol this morning.  Check labs, troponin, electrolytes consult cardiology and monitor.

## 2023-02-11 NOTE — H&P ADULT - PROBLEM SELECTOR PLAN 4
- unclear etiology, patient missed Toprol dose today  - low suspicion for PE  - patient currently with significant abdominal discomfort, and anxiety  - continue to monitor

## 2023-02-11 NOTE — H&P ADULT - NSHPSOCIALHISTORY_GEN_ALL_CORE
No substance abuse, alcohol, tobacco, recreational drugs.  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  Lives with daughter Himanshu  Independent w/ all ADLs

## 2023-02-11 NOTE — ED ADULT NURSE NOTE - OBJECTIVE STATEMENT
68 yo female brought by EMS from home complaining of near syncope and SOB "I had a heart attack two weeks ago and today, out of nowhere I felt like I could not breathe and I was about to pass out" as per EMS, pt's saturation was 92% at home, NC 3L given until arrival at the hospital. At arrival, pt was a code EKG, saturation 96% on room air, heart rate elevated and pt stating having some difficulty breathing. Lungs clear, pt able to complete sentences. IV placed on left AC 20G, blood drawn, sent to lab. MD at the bedside, EKG done and presented, pt connected to cardiac and respiratory monitor. Pt able to move and ambulate without assistance, family at the bedside.

## 2023-02-11 NOTE — ED PROVIDER NOTE - PROGRESS NOTE DETAILS
Nuris Sampson- cardiology notified, will come see pt. Nuris Sampson- spoke to cardiology, recommend metolazone. pt updated on admission plan, pt agrees

## 2023-02-11 NOTE — H&P ADULT - ASSESSMENT
61F recently admitted to [facility] with late presentation anterior MI (Jan 2022), CAD w/ 100% pLAD, HFrEF (EF 40%) likely ICM presenting with persistent abdominal discomfort and shortness of breath at night.   67F recently admitted to [facility] with late presentation anterior MI (Jan 2022), CAD w/ 100% pLAD, HFrEF (EF 40%) likely ICM presenting with persistent abdominal discomfort and shortness of breath at night.

## 2023-02-11 NOTE — H&P ADULT - HISTORY OF PRESENT ILLNESS
67F recently admitted to [facility] with late presentation anterior MI (Jan 2022), CAD w/ 100% pLAD, HFrEF (EF 40%) likely ICM presenting with persistent abdominal discomfort and shortness of breath at night.     After discharge from hospital for anterior wall STEMI, has had persistent abdominal discomfort and increasing dyspnea when lying flat. Dyspnea is not present when upright or with exertion but has been unable to lie flat at night. Denies peripheral edema or weight gain. Denies chest pain or diaphoresis since the initial STEMI. Was seen by outpatient cardiologist Dr. Baron and was started on Lasix however developed a diffuse rash < 1 day and therefore discontinued the medication. She presents today because she woke up in the middle of the night gasping for air with worsening abdominal discomfort.    Abdominal discomfort, she describes not as pain, but with belching, early satiety, some acidic taste in the morning. Protonix has not provided her relief although holding the brilinta one night did help. Was asked to switch brilinta to plavix for this reason by Dr. Baron.     In ED, vitals were normal, EKG was unchanged from prior, Labs were overall unremarkable, CXR with small R PLEF. Cardiology was consulted, and patient was started on metolazone for diuresis.

## 2023-02-11 NOTE — H&P ADULT - NSHPLABSRESULTS_GEN_ALL_CORE
.  LABS:                         11.5   11.22 )-----------( 631      ( 11 Feb 2023 12:23 )             37.9     02-11    142  |  105  |  12  ----------------------------<  106<H>  4.0   |  26  |  0.77    Ca    9.4      11 Feb 2023 12:23    TPro  7.9  /  Alb  3.6  /  TBili  0.5  /  DBili  x   /  AST  11  /  ALT  16  /  AlkPhos  161<H>  02-11        Serum Pro-Brain Natriuretic Peptide: [id number] pg/mL (02-11 @ 12:23)        RADIOLOGY, EKG & ADDITIONAL TESTS: Reviewed.

## 2023-02-11 NOTE — H&P ADULT - NSHPPHYSICALEXAM_GEN_ALL_CORE
PHYSICAL EXAM:  GENERAL: NAD, lying in bed comfortably  HEAD:  Atraumatic, Normocephalic  EYES: EOMI, PERRLA, conjunctiva and sclera clear  ENT: Moist mucous membranes  NECK: Supple, No JVD  CHEST/LUNG: OVERALL CLEAR LUNGS, MILD CRACKLES AT BASES  HEART: Regular rate and rhythm; No murmurs, rubs, or gallops  ABDOMEN: Bowel sounds present; Soft, Nontender, Nondistended. No hepatomegally  EXTREMITIES:  TRACE EDEMA AT ANKLES BILATERALLY  NERVOUS SYSTEM:  Alert & Oriented X3, speech clear. No deficits   MSK: FROM all 4 extremities, full and equal strength  SKIN: No rashes or lesions

## 2023-02-11 NOTE — CONSULT NOTE ADULT - SUBJECTIVE AND OBJECTIVE BOX
Patient seen and evaluated at bedside    Chief Complaint:    HPI:      PMHx:   No pertinent past medical history        PSHx:       Allergies:  Lasix (Rash)      Home Meds:    Current Medications:       FAMILY HISTORY:      Social History:  Smoking History:  Alcohol Use:  Drug Use:    REVIEW OF SYSTEMS:  Constitutional:     [ ] negative [ ] fevers [ ] chills [ ] weight loss [ ] weight gain  HEENT:                  [ ] negative [ ] dry eyes [ ] eye irritation [ ] postnasal drip [ ] nasal congestion  CV:                         [ ] negative  [ ] chest pain [ ] orthopnea [ ] palpitations [ ] murmur  Resp:                     [ ] negative [ ] cough [ ] shortness of breath [ ] dyspnea [ ] wheezing [ ] sputum [ ]hemoptysis  GI:                          [ ] negative [ ] nausea [ ] vomiting [ ] diarrhea [ ] constipation [ ] abd pain [ ] dysphagia   :                        [ ] negative [ ] dysuria [ ] nocturia [ ] hematuria [ ] increased urinary frequency  Musculoskeletal: [ ] negative [ ] back pain [ ] myalgias [ ] arthralgias [ ] fracture  Skin:                       [ ] negative [ ] rash [ ] itch  Neurological:        [ ] negative [ ] headache [ ] dizziness [ ] syncope [ ] weakness [ ] numbness  Psychiatric:           [ ] negative [ ] anxiety [ ] depression  Endocrine:            [ ] negative [ ] diabetes [ ] thyroid problem  Heme/Lymph:      [ ] negative [ ] anemia [ ] bleeding problem  Allergic/Immune: [ ] negative [ ] itchy eyes [ ] nasal discharge [ ] hives [ ] angioedema    [ ] All other systems negative  [ ] Unable to assess ROS due to      Physical Exam:  T(F): 98.4 (02-11), Max: 98.4 (02-11)  HR: 109 (02-11) (109 - 120)  BP: 108/85 (02-11) (108/85 - 130/84)  RR: 22 (02-11)  SpO2: 97% (02-11)  GENERAL: No acute distress, well-developed  HEAD:  Atraumatic, Normocephalic  ENT: EOMI, PERRLA, conjunctiva and sclera clear, Neck supple, No JVD, moist mucosa  CHEST/LUNG: Clear to auscultation bilaterally; No wheeze, equal breath sounds bilaterally   BACK: No spinal tenderness  HEART: Regular rate and rhythm; No murmurs, rubs, or gallops  ABDOMEN: Soft, Nontender, Nondistended; Bowel sounds present  EXTREMITIES:  No clubbing, cyanosis, or edema  PSYCH: Nl behavior, nl affect  NEUROLOGY: AAOx3, non-focal, cranial nerves intact  SKIN: Normal color, No rashes or lesions  LINES:    Cardiovascular Diagnostic Testing:    ECG: Personally reviewed:    Echo: Personally reviewed:    Stress Testing:    Cath:    Imaging:    CXR: Personally reviewed    Labs: Personally reviewed                        11.5   11.22 )-----------( 631      ( 11 Feb 2023 12:23 )             37.9     02-11    142  |  105  |  12  ----------------------------<  106<H>  4.0   |  26  |  0.77    Ca    9.4      11 Feb 2023 12:23    TPro  7.9  /  Alb  3.6  /  TBili  0.5  /  DBili  x   /  AST  11  /  ALT  16  /  AlkPhos  161<H>  02-11      Serum Pro-Brain Natriuretic Peptide: [id number] pg/mL (02-11 @ 12:23)            CARDIAC MARKERS ( 11 Feb 2023 12:23 )  303 ng/L / x     / x     / x     / x     / x          Lasix (Rash)      T(F): 98.4 (02-11), Max: 98.4 (02-11)  HR: 109 (02-11) (109 - 120)  BP: 108/85 (02-11) (108/85 - 130/84)  RR: 22 (02-11)  SpO2: 97% (02-11) Patient seen and evaluated at bedside    Chief Complaint: shortness of breath    HPI:      PMHx:   No pertinent past medical history        PSHx:       Allergies:  Lasix (Rash)      Home Meds:    · 	furosemide 40 mg oral tablet: 1 tab(s) orally every other day   · 	metoprolol succinate 25 mg oral capsule, extended release: 0.5 cap(s) orally once a day   · 	aspirin 81 mg oral tablet, chewable: 1 tab(s) orally once a day  · 	atorvastatin 80 mg oral tablet: 1 tab(s) orally once a day (at bedtime)  · 	sacubitril-valsartan 24 mg-26 mg oral tablet: 1 tab(s) orally 2 times a day  · 	ticagrelor 90 mg oral tablet: 1 tab(s) orally every 12 hours    Current Medications:       FAMILY HISTORY:      Social History:  Smoking History:  Alcohol Use:  Drug Use:    REVIEW OF SYSTEMS:  Constitutional:     [ ] negative [ ] fevers [ ] chills [ ] weight loss [ ] weight gain  HEENT:                  [ ] negative [ ] dry eyes [ ] eye irritation [ ] postnasal drip [ ] nasal congestion  CV:                         [ ] negative  [ ] chest pain [ ] orthopnea [ ] palpitations [ ] murmur  Resp:                     [ ] negative [ ] cough [ ] shortness of breath [ ] dyspnea [ ] wheezing [ ] sputum [ ]hemoptysis  GI:                          [ ] negative [ ] nausea [ ] vomiting [ ] diarrhea [ ] constipation [ ] abd pain [ ] dysphagia   :                        [ ] negative [ ] dysuria [ ] nocturia [ ] hematuria [ ] increased urinary frequency  Musculoskeletal: [ ] negative [ ] back pain [ ] myalgias [ ] arthralgias [ ] fracture  Skin:                       [ ] negative [ ] rash [ ] itch  Neurological:        [ ] negative [ ] headache [ ] dizziness [ ] syncope [ ] weakness [ ] numbness  Psychiatric:           [ ] negative [ ] anxiety [ ] depression  Endocrine:            [ ] negative [ ] diabetes [ ] thyroid problem  Heme/Lymph:      [ ] negative [ ] anemia [ ] bleeding problem  Allergic/Immune: [ ] negative [ ] itchy eyes [ ] nasal discharge [ ] hives [ ] angioedema    [ ] All other systems negative  [ ] Unable to assess ROS due to      Physical Exam:  T(F): 98.4 (-), Max: 98.4 ()  HR: 109 () (109 - 120)  BP: 108/85 () (108/85 - 130/84)  RR: 22 (-)  SpO2: 97% ()  GENERAL: No acute distress, well-developed  HEAD:  Atraumatic, Normocephalic  ENT: ++ JVD  Lungs: Crackles in lower fields  BACK: No spinal tenderness  HEART: Regular rate and rhythm; No murmurs, rubs, or gallops  ABDOMEN: Soft, Nontender, Nondistended; Bowel sounds present  EXTREMITIES:  No clubbing, cyanosis. Trace edema      Cardiovascular Diagnostic Testing:    ECG: Personally reviewed:    TTE with Doppler (w/Cont) (23 @ 07:20)     Patient name: REGINALD MARK  YOB: 1955   Age: 67 (F)   MR#: [id number]  Study Date: 2023  Location: O/PSonographer: AKI Cueto  Study quality: Technically good  ------------------------------------------------------------------------  Dimensions:    Normal Values:  LA:     3.9    2.0 - 4.0 cm  Ao:     2.7    2.0 - 3.8 cm  SEPTUM: 0.6    0.6 - 1.2 cm  PWT:    0.9    0.6 - 1.1 cm  LVIDd:  4.9    3.0 - 5.6 cm  LVIDs:  4.0    1.8 - 4.0 cm  Derived variables:  LVMI: 67 g/m2  RWT: 0.36  Fractional short: 18 %  EF (Alcala Rule): 41 %Doppler Peak Velocity (m/sec):  AoV=1.4  ------------------------------------------------------------------------  Conclusions:  1. Mild mitral annular calcification. Tethered mitral valve leaflets with normal opening. Mild mitral regurgitation.  2. Calcified trileaflet aortic valve with normal opening. Minimal aortic regurgitation.  3. Endocardial visualization enhanced with intravenous injection of Ultrasonic Enhancing Agent (Definity). Moderate segmental left ventricular systolic dysfunction. The distal septum, distal anterolateral, distal inferior, mid to distal anterior, distal inferolateral, basal to mid anteroseptal, and the apex are akinetic. 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 No left ventricular thrombus.  4. Normal right ventricular size and function.  Results discussed with CICU fellow.  *** No previous Echo exam.  ------------------------------------------------------------------------  Confirmed on  2023 - 12:21:47 by Konrad Fortune M.D.        Cardiac Catheterization (23 @ 11:02)   Study Date:     2023   Name:           REGINALD MARK   :            1955   (67 years)   Gender:         female     Cath Lab Report    Diagnostic Cardiologist:       Bradley Baron MD   Fellow:                        Tami Franklin MD   Referring Physician:           Elvira Sosa MD     Procedures Performed   Procedures:                 1.    Ultrasound Guided Access   2.    Arterial Access - Right Femoral   3.    Diagnostic Coronary Angiography   4.    Left Heart Cath     Indications:                Late presentation STEMI     Diagnostic Conclusions:   Proximal LAD occlusion. Moderate atherosclerosis in mid Cx and OM2.     Recommendations:   Patient with late presentation anterior STEMI. Currently asymptomatic (denies chest pain or SOB). Optimize medical therapy for ischemic cardiomyopathy. Consider viability testing to guide further management of occluded LAD.      Diagnostic Findings:     Coronary Angiography   The coronary circulation is right dominant. Cardiac catheterization was performed urgently.    LM   Left main artery: Normal. Short.      LAD   Proximal left anterior descending: The distal vessel is supplied by faint collaterals from the Right posterior descending artery.  There is a 100 % stenosis. MARTHA Flow 0.      CX   Mid circumflex: There is a 50 % stenosis in the middle third portion of the segment after branch to OM1. MARTHA Flow 3. Second obtuse marginal: There is a 40 % stenosis in the proximal third portion of the segment.    RCA   Mid right coronary artery: There is a 20 % stenosis in the middle third portion of the segment.    Left Heart Cath   Left ventricular function was not assessed.  Imaging:    CXR: Personally reviewed      FINDINGS:  The cardiomediastinal silhouette is not accurately assessed in this AP projection.  Small right pleural effusion. Left basilar subsegmental atelectasis.  No pneumothorax.  No acute bony abnormality.    IMPRESSION:  Small right pleural effusion.      Labs: Personally reviewed                        11. )-----------( 631      ( 2023 12:23 )             37.9         142  |  105  |  12  ----------------------------<  106<H>  4.0   |  26  |  0.77    Ca    9.4      2023 12:23    TPro  7.9  /  Alb  3.6  /  TBili  0.5  /  DBili  x   /  AST  11  /  ALT  16  /  AlkPhos  161<H>        Serum Pro-Brain Natriuretic Peptide: 27006 pg/mL ( @ 12:23)            CARDIAC MARKERS ( 2023 12:23 )  303 ng/L / x     / x     / x     / x     / x          Lasix (Rash)      T(F): 98.4 (), Max: 98.4 ()  HR: 109 () (109 - 120)  BP: 108/85 () (108/85 - 130/84)  RR: 22 ()  SpO2: 97% () Patient seen and evaluated at bedside    Chief Complaint: shortness of breath    HPI:    67F with history of late presenting STEMI with total occlusion of prox LAD (not revascularized), HFrEF (EF 36%) who comes in for shortness of breath. The patient was recently discharged after she presented with anterior wall stemi with 100% occulsion of pLAD which was not revascularized. She underwent cardiac MRI at the time and decision was made to treat medically. She was recently seen by Dr. Baron in cardiology clinic and was fluid overloaded and so she was started on lasix. She was improving with lasix but developped hives throughout her body without evidence of angioedema and so she was instructed to stop. She ntoes that she has been short of breath on exertion and has had some orthopnea noting that she is not able to get comfortable while flat. She had one episode of PND today. She has not ahd any chest pain.  She denies any recent illnesses or ill contacts.     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     CT/MRI:   23 (MRI viability) - image quality markedly degraded due to motion artifact, in particular,   the late gadolinium enhancement sequences are limited due to motion; transmural scarring involving the mid anterior wall of the left ventricle; there is likely microvascular obstruction involving the left ventricular apical anterior wall and majority of the septal wall, LVEF 36%     Cardiac Cath/PCI:   23 (CATH) - pLAD 100% (faint collaterals from RPDA), mCx 50%, pOM2 40%, mRCA 20%     PMHx:   No pertinent past medical history        PSHx:       Allergies:  Lasix (Rash)      Home Meds:    · 	furosemide 40 mg oral tablet: 1 tab(s) orally every other day   · 	metoprolol succinate 25 mg oral capsule, extended release: 0.5 cap(s) orally once a day   · 	aspirin 81 mg oral tablet, chewable: 1 tab(s) orally once a day  · 	atorvastatin 80 mg oral tablet: 1 tab(s) orally once a day (at bedtime)  · 	sacubitril-valsartan 24 mg-26 mg oral tablet: 1 tab(s) orally 2 times a day  · 	ticagrelor 90 mg oral tablet: 1 tab(s) orally every 12 hours    Current Medications:       FAMILY HISTORY:      Social History:  Not reviewed    REVIEW OF SYSTEMS:  Constitutional:     [ ] negative [ ] fevers [ ] chills [ ] weight loss [ ] weight gain  HEENT:                  [ ] negative [ ] dry eyes [ ] eye irritation [ ] postnasal drip [ ] nasal congestion  CV:                         [ ] negative  [ ] chest pain [ ] orthopnea [ ] palpitations [ ] murmur  Resp:                     [ ] negative [ ] cough [ ] shortness of breath [ ] dyspnea [ ] wheezing [ ] sputum [ ]hemoptysis  GI:                          [ ] negative [ ] nausea [ ] vomiting [ ] diarrhea [ ] constipation [ ] abd pain [ ] dysphagia   :                        [ ] negative [ ] dysuria [ ] nocturia [ ] hematuria [ ] increased urinary frequency  Musculoskeletal: [ ] negative [ ] back pain [ ] myalgias [ ] arthralgias [ ] fracture  Skin:                       [ ] negative [ ] rash [ ] itch  Neurological:        [ ] negative [ ] headache [ ] dizziness [ ] syncope [ ] weakness [ ] numbness  Psychiatric:           [ ] negative [ ] anxiety [ ] depression  Endocrine:            [ ] negative [ ] diabetes [ ] thyroid problem  Heme/Lymph:      [ ] negative [ ] anemia [ ] bleeding problem  Allergic/Immune: [ ] negative [ ] itchy eyes [ ] nasal discharge [ ] hives [ ] angioedema    [ ] All other systems negative  [ ] Unable to assess ROS due to      Physical Exam:  T(F): 98.4 (), Max: 98.4 ()  HR: 109 () (109 - 120)  BP: 108/85 () (108/85 - 130/84)  RR: 22 ()  SpO2: 97% ()  GENERAL: No acute distress, well-developed  HEAD:  Atraumatic, Normocephalic  ENT: ++ JVD  Lungs: Crackles in lower fields  BACK: No spinal tenderness  HEART: Regular rate and rhythm; No murmurs, rubs, or gallops  ABDOMEN: Soft, Nontender, Nondistended; Bowel sounds present  EXTREMITIES:  No clubbing, cyanosis. Trace edema      Cardiovascular Diagnostic Testing:    ECG: Personally reviewed: sinus rhythm with Q waves in v1-v4    TTE with Doppler (w/Cont) (23 @ 07:20)     Patient name: REGINALD MARK  YOB: 1955   Age: 67 (F)   MR#: [id number]  Study Date: 2023  Location: O/PSonographer: AKI Cueto  Study quality: Technically good  ------------------------------------------------------------------------  Dimensions:    Normal Values:  LA:     3.9    2.0 - 4.0 cm  Ao:     2.7    2.0 - 3.8 cm  SEPTUM: 0.6    0.6 - 1.2 cm  PWT:    0.9    0.6 - 1.1 cm  LVIDd:  4.9    3.0 - 5.6 cm  LVIDs:  4.0    1.8 - 4.0 cm  Derived variables:  LVMI: 67 g/m2  RWT: 0.36  Fractional short: 18 %  EF (Alcala Rule): 41 %Doppler Peak Velocity (m/sec):  AoV=1.4  ------------------------------------------------------------------------  Conclusions:  1. Mild mitral annular calcification. Tethered mitral valve leaflets with normal opening. Mild mitral regurgitation.  2. Calcified trileaflet aortic valve with normal opening. Minimal aortic regurgitation.  3. Endocardial visualization enhanced with intravenous injection of Ultrasonic Enhancing Agent (Definity). Moderate segmental left ventricular systolic dysfunction. The distal septum, distal anterolateral, distal inferior, mid to distal anterior, distal inferolateral, basal to mid anteroseptal, and the apex are akinetic. 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 No left ventricular thrombus.  4. Normal right ventricular size and function.  Results discussed with CICU fellow.  *** No previous Echo exam.  ------------------------------------------------------------------------  Confirmed on  2023 - 12:21:47 by Konrad Fortune M.D.        Cardiac Catheterization (23 @ 11:02)   Study Date:     2023   Name:           REGINALD MARK   :            1955   (67 years)   Gender:         female     Cath Lab Report    Diagnostic Cardiologist:       Bradley Baron MD   Fellow:                        Tami Franklin MD   Referring Physician:           Elvira Sosa MD     Procedures Performed   Procedures:                 1.    Ultrasound Guided Access   2.    Arterial Access - Right Femoral   3.    Diagnostic Coronary Angiography   4.    Left Heart Cath     Indications:                Late presentation STEMI     Diagnostic Conclusions:   Proximal LAD occlusion. Moderate atherosclerosis in mid Cx and OM2.     Recommendations:   Patient with late presentation anterior STEMI. Currently asymptomatic (denies chest pain or SOB). Optimize medical therapy for ischemic cardiomyopathy. Consider viability testing to guide further management of occluded LAD.      Diagnostic Findings:     Coronary Angiography   The coronary circulation is right dominant. Cardiac catheterization was performed urgently.    LM   Left main artery: Normal. Short.      LAD   Proximal left anterior descending: The distal vessel is supplied by faint collaterals from the Right posterior descending artery.  There is a 100 % stenosis. MARTHA Flow 0.      CX   Mid circumflex: There is a 50 % stenosis in the middle third portion of the segment after branch to OM1. MARTHA Flow 3. Second obtuse marginal: There is a 40 % stenosis in the proximal third portion of the segment.    RCA   Mid right coronary artery: There is a 20 % stenosis in the middle third portion of the segment.    Left Heart Cath   Left ventricular function was not assessed.  Imaging:    CXR: Personally reviewed      FINDINGS:  The cardiomediastinal silhouette is not accurately assessed in this AP projection.  Small right pleural effusion. Left basilar subsegmental atelectasis.  No pneumothorax.  No acute bony abnormality.    IMPRESSION:  Small right pleural effusion.      Labs: Personally reviewed                         )-----------( 631      ( 2023 12:23 )             37.9     02-11    142  |  105  |  12  ----------------------------<  106<H>  4.0   |  26  |  0.77    Ca    9.4      2023 12:23    TPro  7.9  /  Alb  3.6  /  TBili  0.5  /  DBili  x   /  AST  11  /  ALT  16  /  AlkPhos  161<H>        Serum Pro-Brain Natriuretic Peptide: [id number] pg/mL ( @ 12:23)            CARDIAC MARKERS ( 2023 12:23 )  303 ng/L / x     / x     / x     / x     / x          Lasix (Rash)      T(F): 98.4 (), Max: 98.4 ()  HR: 109 () (109 - 120)  BP: 108/85 () (108/85 - 130/84)  RR: 22 ()  SpO2: 97% ()

## 2023-02-11 NOTE — H&P ADULT - NSHPREVIEWOFSYSTEMS_GEN_ALL_CORE
General: Denies dizziness, fatigue  Eyes: Denies blurry vision  ENMT: Denies rhinorrhea  Respiratory: Denies cough, SOB  Cardiovascular: DENIES chest pain.   Gastrointestinal: ABDOMINAL DISCOMFORT, NOT PAIN  : Denies dysuria, increased frequent  Musculoskeletal: DENIES PERIPHERAL EDEMA  Endocrine: Denies increased thirst, increased frequency  Allergic/Immunologic: Denies rashes or hives  Neuro: Denies weakness, numbness  Psych: Denies anxiety, depression  All ROS negative unless indicated above

## 2023-02-11 NOTE — H&P ADULT - PROBLEM SELECTOR PLAN 3
- late presentation anterior wall MI (1/21/23)  - [facility]  1/22/23: pLAD 100%, mCx 50%, OM2 40%  - trops, LFTs downtrending since event 2 weeks prior  - denies anginal symptoms since event

## 2023-02-11 NOTE — ED PROVIDER NOTE - PHYSICAL EXAMINATION
General appearance: NAD, conversant, afebrile    Eyes: anicteric sclerae, VINH, EOMI   HENT: Atraumatic; oropharynx clear, MMM and no ulcerations, no pharyngeal erythema or exudate   Neck: Trachea midline; Full range of motion, supple   Pulm: bl rales, intermittent hypoxia to 88 with spontaneous resolution to 96 on RA, normal respiratory effort and no intercostal retractions, normal work of breathing   CV: RRR, No murmurs, rubs, or gallops. 2+ peripheral pulses.   Abdomen: Soft, non-tender, non-distended; no guarding or rebound   Extremities: No peripheral edema or extremity lymphadenopathy. 5/5 strength in all four extremities.   Skin: Dry, normal temperature, turgor and texture; no rash, ulcers or subcutaneous nodules   Psych: Appropriate affect, cooperative; alert and oriented to person, place and time

## 2023-02-11 NOTE — ED ADULT NURSE REASSESSMENT NOTE - NS ED NURSE REASSESS COMMENT FT1
Patient sitting comfortably in bed. Family is present at bedside. Patient and family updated on POC. No acute distress present at this time. Call bell within reach, bed in lowest position.

## 2023-02-11 NOTE — ED PROVIDER NOTE - CLINICAL SUMMARY MEDICAL DECISION MAKING FREE TEXT BOX
67-year-old female with history of STEMI presenting with shortness of breath.  Patient had STEMI 2 weeks ago with 100% occlusion of LAD, not able to stent, discharged on lisinopril, aspirin, Brilinta.  Found to have EF of 36%.  Followed up with Dr. Bradley Baron on Wednesday during which Lasix was stopped due to urticarial reaction.  Today, patient started having acute onset of shortness of breath while sitting, no chest pain.  + Nausea, diaphoresis.  No fever, abdominal pain.  Denies any leg swelling or pain. Exam shows bl rales in bases, intermittently hypoxic to 88 with spontaneous resolution to 96 on RA. c/f CHF exacerbation. Will get labs, cxr, cards consult.

## 2023-02-11 NOTE — H&P ADULT - PROBLEM SELECTOR PLAN 2
- Abdominal discomfort, not pain, constant, not worse with exertion since last admission  - mildly improved after holding brilinta one night  - Ddx: medication induced, anginal component although not worse with exertion, CHF volume may be contributory as well  - switch to plavix as above  - optimize volume status as above  - monitor - Abdominal discomfort, not pain, associated with early satiety, bloating, belching, reflux  - Dyspepsia, mildly improved after holding brilinta   - Ddx: medication induced, less likely anginal component, CHF volume may be contributing although history is slightly inconsistent, some concern for stress ulcer i/s/o recent anterior wall MI   - c/w protonix daily  - switch to plavix as above  - optimize volume status as above  - if not improved with medical optimization as above, discharge with outpatient GI follow up

## 2023-02-11 NOTE — ED PROVIDER NOTE - OBJECTIVE STATEMENT
67-year-old female with history of STEMI presenting with shortness of breath.  Patient had STEMI 2 weeks ago with 100% occlusion of LAD, not able to stent, discharged on lisinopril, aspirin, Brilinta.  Found to have EF of 36%.  Followed up with Dr. Bradley Baron on Wednesday during which Lasix was stopped due to urticarial reaction.  Today, patient started having acute onset of shortness of breath while sitting, no chest pain.  + Nausea, diaphoresis.  No fever, abdominal pain.  Denies any leg swelling or pain.

## 2023-02-11 NOTE — CONSULT NOTE ADULT - ASSESSMENT
67F with history of late presenting STEMI with total occlusion of prox LAD (not revascularized), HFrEF (EF 36%) who comes in for shortness of breath. Exam significant for fluid overload. She was recently seen by Dr. Baron and was started on lasix. She was improving with lasix bu could not tolerate due to allergy. Currently x-ray significant for pulmonary edema. BNP elevated at 10,000 with baseline 2000.     #HFrEF:   - Continue Entresto 24/26 bid if BP tolerates. hold for SBP <85  - Continue toprol 12.5 xl daily   - patient hesitant to start SGLT2i inpatient and would defer this and MRA to outpatient.   - Metolazone 2.5mg now and assess for urine output  - If patient without good urine output by this evening, can try ethacrynic acid 50mg IV daily  - Strict I/O  - Daily weights  - Restrict fluids and sodium uptake    #STEMI: cath and viability study reviewed.   patient is currently chest pain free.   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 Discussed with Dr. Baron- plan for outpatient follow up consideration for revascularization and repeat viability study  - c/w MICHELA Moss and statin    Vasyl Lopes MD  Cardiology fellow 67F with history of late presenting STEMI with total occlusion of prox LAD (not revascularized), HFrEF (EF 36%) who comes in for shortness of breath. Exam significant for fluid overload. She was recently seen by Dr. Baron and was started on lasix. She was improving with lasix bu could not tolerate due to allergy. Currently x-ray significant for pulmonary edema. BNP elevated at 10,000 with baseline 2000.     #HFrEF:   - Continue Entresto 24/26 bid if BP tolerates. hold for SBP <85  - Continue toprol 12.5 xl daily   - patient hesitant to start SGLT2i inpatient and would defer this and MRA to outpatient.   - Metolazone 2.5mg now and assess for urine output  - If patient without good urine output by this evening, can try ethacrynic acid 50mg IV daily  - Strict I/O  - Daily weights  - Restrict fluids and sodium uptake    #STEMI: cath and viability study reviewed.   patient is currently chest pain free.   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 Discussed with Dr. Baron- plan for outpatient follow up consideration for revascularization and repeat viability study  - c/w ASA and statin  -Per Dr. Baron's last outpatient note, switch ticagrelor to plavix 75mg daily    Vasyl Lopes MD  Cardiology fellow 67F with history of late presenting STEMI with total occlusion of prox LAD (not revascularized), HFrEF (EF 36%) who comes in for shortness of breath. Exam significant for fluid overload. She was recently seen by Dr. Baron and was started on lasix. She was improving with lasix bu could not tolerate due to allergy. Currently x-ray significant for pulmonary edema. BNP elevated at 10,000 with baseline 2000.     #HFrEF:    - Continue Entresto 24/26 bid if BP tolerates. hold for SBP <85  - Continue toprol 12.5 xl daily   - patient hesitant to start SGLT2i inpatient and would defer this and MRA to outpatient.   - Metolazone 2.5mg now and assess for urine output  - Ethacrynic Acid - continiue and assess for insurance coverage as outpatient    #STEMI: cath and viability study reviewed.   patient is currently chest pain free.   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 Discussed with Dr. Baron- plan for outpatient follow up consideration for revascularization and repeat viability study  - c/w ASA and statin  -Per Dr. Baron's last outpatient note, switch ticagrelor to plavix 75mg daily    Vasyl Lopes MD  Cardiology fellow

## 2023-02-11 NOTE — H&P ADULT - PROBLEM SELECTOR PLAN 1
- HFrEF 2/2 ICM (late presentation anterior MI 1/21/23 follows w/ Dr. Bradley Baron)  - TTE 1/22/23: EF 41%, distal septum, anterolateral wall akinetic   - dyspnea when lying flat, crackles, BNP 10K from 2K two weeks prior  - switch from brilinta to plavix per Dr. Baron  - appreciate card recs  - had allergic reaction to lasix  - c/w metolazone per cards, consider ethacrynic acid if no response  - s/p metolazone  - strict I&Os, replete lytes K > 4, Mg > 2  - monitor on tele - HFrEF 2/2 ICM (late presentation anterior MI 1/21/23 follows w/ Dr. Bradley Baron)  - TTE 1/22/23: EF 41%, distal septum, anterolateral wall akinetic   - dyspnea when lying flat, crackles, BNP 10K from 2K two weeks prior  - switch from brilinta to plavix per Dr. Baron  - appreciate card recs  - had allergic reaction to lasix  - c/w home entresto 24/26 BID, toprol 12.5 qD  - c/w metolazone per cards, consider ethacrynic acid if no response  - s/p metolazone  - strict I&Os, replete lytes K > 4, Mg > 2  - monitor on tele

## 2023-02-12 DIAGNOSIS — F41.9 ANXIETY DISORDER, UNSPECIFIED: ICD-10-CM

## 2023-02-12 LAB
ALBUMIN SERPL ELPH-MCNC: 3 G/DL — LOW (ref 3.3–5)
ALBUMIN SERPL ELPH-MCNC: 3.3 G/DL — SIGNIFICANT CHANGE UP (ref 3.3–5)
ALP SERPL-CCNC: 131 U/L — HIGH (ref 40–120)
ALP SERPL-CCNC: 145 U/L — HIGH (ref 40–120)
ALT FLD-CCNC: 12 U/L — SIGNIFICANT CHANGE UP (ref 10–45)
ALT FLD-CCNC: 14 U/L — SIGNIFICANT CHANGE UP (ref 10–45)
ANION GAP SERPL CALC-SCNC: 13 MMOL/L — SIGNIFICANT CHANGE UP (ref 5–17)
ANION GAP SERPL CALC-SCNC: 13 MMOL/L — SIGNIFICANT CHANGE UP (ref 5–17)
AST SERPL-CCNC: 13 U/L — SIGNIFICANT CHANGE UP (ref 10–40)
AST SERPL-CCNC: 15 U/L — SIGNIFICANT CHANGE UP (ref 10–40)
BILIRUB SERPL-MCNC: 0.4 MG/DL — SIGNIFICANT CHANGE UP (ref 0.2–1.2)
BILIRUB SERPL-MCNC: 0.7 MG/DL — SIGNIFICANT CHANGE UP (ref 0.2–1.2)
BUN SERPL-MCNC: 12 MG/DL — SIGNIFICANT CHANGE UP (ref 7–23)
BUN SERPL-MCNC: 18 MG/DL — SIGNIFICANT CHANGE UP (ref 7–23)
CALCIUM SERPL-MCNC: 8.8 MG/DL — SIGNIFICANT CHANGE UP (ref 8.4–10.5)
CALCIUM SERPL-MCNC: 9.4 MG/DL — SIGNIFICANT CHANGE UP (ref 8.4–10.5)
CHLORIDE SERPL-SCNC: 103 MMOL/L — SIGNIFICANT CHANGE UP (ref 96–108)
CHLORIDE SERPL-SCNC: 103 MMOL/L — SIGNIFICANT CHANGE UP (ref 96–108)
CO2 SERPL-SCNC: 23 MMOL/L — SIGNIFICANT CHANGE UP (ref 22–31)
CO2 SERPL-SCNC: 25 MMOL/L — SIGNIFICANT CHANGE UP (ref 22–31)
CREAT SERPL-MCNC: 0.82 MG/DL — SIGNIFICANT CHANGE UP (ref 0.5–1.3)
CREAT SERPL-MCNC: 1.1 MG/DL — SIGNIFICANT CHANGE UP (ref 0.5–1.3)
EGFR: 55 ML/MIN/1.73M2 — LOW
EGFR: 79 ML/MIN/1.73M2 — SIGNIFICANT CHANGE UP
GLUCOSE SERPL-MCNC: 118 MG/DL — HIGH (ref 70–99)
GLUCOSE SERPL-MCNC: 121 MG/DL — HIGH (ref 70–99)
HCT VFR BLD CALC: 36.8 % — SIGNIFICANT CHANGE UP (ref 34.5–45)
HCV AB S/CO SERPL IA: 0.1 S/CO — SIGNIFICANT CHANGE UP (ref 0–0.99)
HCV AB SERPL-IMP: SIGNIFICANT CHANGE UP
HGB BLD-MCNC: 11.2 G/DL — LOW (ref 11.5–15.5)
MAGNESIUM SERPL-MCNC: 2 MG/DL — SIGNIFICANT CHANGE UP (ref 1.6–2.6)
MAGNESIUM SERPL-MCNC: 2.3 MG/DL — SIGNIFICANT CHANGE UP (ref 1.6–2.6)
MCHC RBC-ENTMCNC: 28.1 PG — SIGNIFICANT CHANGE UP (ref 27–34)
MCHC RBC-ENTMCNC: 30.4 GM/DL — LOW (ref 32–36)
MCV RBC AUTO: 92.2 FL — SIGNIFICANT CHANGE UP (ref 80–100)
NRBC # BLD: 0 /100 WBCS — SIGNIFICANT CHANGE UP (ref 0–0)
PHOSPHATE SERPL-MCNC: 3.8 MG/DL — SIGNIFICANT CHANGE UP (ref 2.5–4.5)
PHOSPHATE SERPL-MCNC: 4.2 MG/DL — SIGNIFICANT CHANGE UP (ref 2.5–4.5)
PLATELET # BLD AUTO: 592 K/UL — HIGH (ref 150–400)
POTASSIUM SERPL-MCNC: 3.9 MMOL/L — SIGNIFICANT CHANGE UP (ref 3.5–5.3)
POTASSIUM SERPL-MCNC: 3.9 MMOL/L — SIGNIFICANT CHANGE UP (ref 3.5–5.3)
POTASSIUM SERPL-SCNC: 3.9 MMOL/L — SIGNIFICANT CHANGE UP (ref 3.5–5.3)
POTASSIUM SERPL-SCNC: 3.9 MMOL/L — SIGNIFICANT CHANGE UP (ref 3.5–5.3)
PROT SERPL-MCNC: 7.4 G/DL — SIGNIFICANT CHANGE UP (ref 6–8.3)
PROT SERPL-MCNC: 7.9 G/DL — SIGNIFICANT CHANGE UP (ref 6–8.3)
RBC # BLD: 3.99 M/UL — SIGNIFICANT CHANGE UP (ref 3.8–5.2)
RBC # FLD: 14.9 % — HIGH (ref 10.3–14.5)
SODIUM SERPL-SCNC: 139 MMOL/L — SIGNIFICANT CHANGE UP (ref 135–145)
SODIUM SERPL-SCNC: 141 MMOL/L — SIGNIFICANT CHANGE UP (ref 135–145)
TROPONIN T, HIGH SENSITIVITY RESULT: 293 NG/L — HIGH (ref 0–51)
WBC # BLD: 11.24 K/UL — HIGH (ref 3.8–10.5)
WBC # FLD AUTO: 11.24 K/UL — HIGH (ref 3.8–10.5)

## 2023-02-12 PROCEDURE — 99233 SBSQ HOSP IP/OBS HIGH 50: CPT

## 2023-02-12 RX ORDER — ETHACRYNIC ACID 25 MG/1
50 TABLET ORAL DAILY
Refills: 0 | Status: DISCONTINUED | OUTPATIENT
Start: 2023-02-12 | End: 2023-02-14

## 2023-02-12 RX ORDER — MAGNESIUM SULFATE 500 MG/ML
2 VIAL (ML) INJECTION ONCE
Refills: 0 | Status: COMPLETED | OUTPATIENT
Start: 2023-02-12 | End: 2023-02-12

## 2023-02-12 RX ORDER — CLOPIDOGREL BISULFATE 75 MG/1
75 TABLET, FILM COATED ORAL DAILY
Refills: 0 | Status: DISCONTINUED | OUTPATIENT
Start: 2023-02-12 | End: 2023-02-21

## 2023-02-12 RX ORDER — METOPROLOL TARTRATE 50 MG
12.5 TABLET ORAL AT BEDTIME
Refills: 0 | Status: DISCONTINUED | OUTPATIENT
Start: 2023-02-12 | End: 2023-02-12

## 2023-02-12 RX ORDER — METOPROLOL TARTRATE 50 MG
25 TABLET ORAL DAILY
Refills: 0 | Status: DISCONTINUED | OUTPATIENT
Start: 2023-02-12 | End: 2023-02-14

## 2023-02-12 RX ADMIN — Medication 25 GRAM(S): at 14:05

## 2023-02-12 RX ADMIN — ETHACRYNIC ACID 50 MILLIGRAM(S): 25 TABLET ORAL at 14:26

## 2023-02-12 RX ADMIN — CLOPIDOGREL BISULFATE 75 MILLIGRAM(S): 75 TABLET, FILM COATED ORAL at 06:24

## 2023-02-12 RX ADMIN — Medication 30 MILLILITER(S): at 06:20

## 2023-02-12 NOTE — PROGRESS NOTE ADULT - ASSESSMENT
66 yo F with no known PMH presents to hospital with late STEMI and found with 100% occlusion of LAD, s/p cardiac viability study, currently medically managing.

## 2023-02-12 NOTE — PROGRESS NOTE ADULT - PROBLEM SELECTOR PLAN 2
- Abdominal discomfort, not pain, associated with early satiety, bloating, belching, reflux  - Dyspepsia, mildly improved after holding brilinta   - Ddx: medication induced, less likely anginal component, CHF volume may be contributing although history is slightly inconsistent, some concern for stress ulcer i/s/o recent anterior wall MI   - c/w protonix daily  - switch to plavix as above  - optimize volume status as above  - if not improved with medical optimization as above, discharge with outpatient GI follow up Assessment:  - Abdominal discomfort, not pain, associated with early satiety, bloating, belching, reflux  - Dyspepsia, mildly improved after holding brilinta   - Ddx: medication induced, less likely anginal component, CHF volume may be contributing although history is slightly inconsistent, some concern for stress ulcer i/s/o recent anterior wall MI     Plan:  - improved off brilinta, with improved volume status  - c/w protonix daily  - if not improved with medical optimization as above, discharge with outpatient GI follow up

## 2023-02-12 NOTE — PROGRESS NOTE ADULT - ATTENDING COMMENTS
patient is a 67 year old female with past medical history of STEMI 2 weeks prior (admitted 1/21) when she was found to have 100% occlusion of LAD, s/p viability study, med treated, with plan for outpatient follow up appointment with interventional cards, started on lasix, which Lasix was stopped due to urticarial reaction, presented with dyspnea, nausea, diaphoresis. labs indicating BNP 10k, chest xray with small right pleural effusion. s/p metolazone in the ED. patient still with dyspnea, abdominal discomfort, heart burn, poor appetite, early satiety.     chart reviewed for past admissions, outpatient notes, labs, imaging, consult notes.    #acute HFrEF, EF 41% per Echo 1/22, 36% per cath 1/23  #CAD  #dyspepsia     - diurese further with ethacrynic acid 50mg IV daily   monitor bmp, keep K>4, Mg>2. strict Is/Os. daily standing weights.   - Entresto held 2/2 patient's agreement with Dr. Baron, c/w beta blocker with hold parameter SBP <90.  - continue DAPT: ASA. start plavix (switched from Brilinta per cardiology), statin. f/u cardiology regarding plan for revascularization and repeat viability study.  - telemonitoring. incentive spirometry, encourage activity as tolerated. fall precautions.   - re-asses abdominal symptoms after improvement in volume status/stopping brilinta. would consider GI eval inpatient vs outpatient, EGD. PPI.    #mild leukocytosis, mild thrombocytosis - unclear significance, ?2/2 stress. trend wbc. monitor for s/s of infection.    # Dizziness + SOB - BP and SaO2 on room air acceptable; will repeat EKG

## 2023-02-12 NOTE — PROGRESS NOTE ADULT - SUBJECTIVE AND OBJECTIVE BOX
Larry Valencia PGY1  pager [phone] or check resident tab for coverage    Patient is a 67y old  Female who presents with a chief complaint of Shortness of breath (11 Feb 2023 16:23)    SUBJECTIVE / OVERNIGHT EVENTS:    Events overnight per night float resident:  - Daughter and Patient reportedly screaming at nursing staff members regarding bed situation in ED  - Situation was not able to be de-escalated by staff in ED, security called, one daughter is permanently banned from the hospital  - Night float resident called to evaluate, patient demanding to leave AMA, night float able to de-escalate situation, patient agreeable to stay in ED for medical optimization    Patient this morning is,    Brief Daily Plan:    MEDICATIONS  MEDICATIONS  (STANDING):  atorvastatin 80 milliGRAM(s) Oral at bedtime  clopidogrel Tablet 75 milliGRAM(s) Oral daily  enoxaparin Injectable 40 milliGRAM(s) SubCutaneous every 24 hours  metoprolol succinate ER 12.5 milliGRAM(s) Oral daily  pantoprazole    Tablet 40 milliGRAM(s) Oral before breakfast  sacubitril 24 mG/valsartan 26 mG 1 Tablet(s) Oral two times a day    MEDICATIONS  (PRN):  acetaminophen     Tablet .. 650 milliGRAM(s) Oral every 6 hours PRN Temp greater or equal to 38C (100.4F), Mild Pain (1 - 3)  aluminum hydroxide/magnesium hydroxide/simethicone Suspension 30 milliLiter(s) Oral every 4 hours PRN Dyspepsia  melatonin 3 milliGRAM(s) Oral at bedtime PRN Insomnia      VITALS  Vital Signs Last 24 Hrs  T(C): 37.3 (12 Feb 2023 05:10), Max: 37.3 (12 Feb 2023 05:10)  T(F): 99.1 (12 Feb 2023 05:10), Max: 99.1 (12 Feb 2023 05:10)  HR: 109 (12 Feb 2023 05:10) (109 - 120)  BP: 113/69 (12 Feb 2023 05:10) (107/85 - 130/84)  BP(mean): --  RR: 18 (12 Feb 2023 05:10) (18 - 24)  SpO2: 95% (12 Feb 2023 05:10) (95% - 100%)    Parameters below as of 12 Feb 2023 05:10  Patient On (Oxygen Delivery Method): room air        PHYSICAL EXAM:  GENERAL: no distress  PSYCH: A&O x3  HEAD: Atraumatic, Normocephalic  NECK: Supple, No JVD  CHEST/LUNG: clear to auscultation bilaterally  HEART: regular rate and rhythm, no murmurs  ABDOMEN: nontender to palpation, no rebound tenderness/guarding  EXTREMITIES: no edema on bilateral LE  NEUROLOGY: no focal neurologic deficit  SKIN: No rashes or lesions    LABS:  All labs personally Reviewed.    RADIOLOGY & ADDITIONAL TESTS:  All imaging personally Reviewed.  Larry Valencia PGY1  pager [phone] or check resident tab for coverage    Patient is a 67y old  Female who presents with a chief complaint of Shortness of breath (11 Feb 2023 16:23)    SUBJECTIVE / OVERNIGHT EVENTS:    Events overnight per night float resident:  - Daughter and Patient reportedly screaming at nursing staff members regarding bed situation in ED  - Situation was not able to be de-escalated by staff in ED, security called, one daughter is permanently banned from the hospital  - Night float resident called to evaluate, patient demanding to leave AMA, night float able to de-escalate situation, patient agreeable to stay in ED for medical optimization    Patient this morning is doing well. Upset that she's in ED, but understands this will take time    Brief Daily Plan:    MEDICATIONS  MEDICATIONS  (STANDING):  atorvastatin 80 milliGRAM(s) Oral at bedtime  clopidogrel Tablet 75 milliGRAM(s) Oral daily  enoxaparin Injectable 40 milliGRAM(s) SubCutaneous every 24 hours  metoprolol succinate ER 12.5 milliGRAM(s) Oral daily  pantoprazole    Tablet 40 milliGRAM(s) Oral before breakfast  sacubitril 24 mG/valsartan 26 mG 1 Tablet(s) Oral two times a day    MEDICATIONS  (PRN):  acetaminophen     Tablet .. 650 milliGRAM(s) Oral every 6 hours PRN Temp greater or equal to 38C (100.4F), Mild Pain (1 - 3)  aluminum hydroxide/magnesium hydroxide/simethicone Suspension 30 milliLiter(s) Oral every 4 hours PRN Dyspepsia  melatonin 3 milliGRAM(s) Oral at bedtime PRN Insomnia      VITALS  Vital Signs Last 24 Hrs  T(C): 37.3 (12 Feb 2023 05:10), Max: 37.3 (12 Feb 2023 05:10)  T(F): 99.1 (12 Feb 2023 05:10), Max: 99.1 (12 Feb 2023 05:10)  HR: 109 (12 Feb 2023 05:10) (109 - 120)  BP: 113/69 (12 Feb 2023 05:10) (107/85 - 130/84)  BP(mean): --  RR: 18 (12 Feb 2023 05:10) (18 - 24)  SpO2: 95% (12 Feb 2023 05:10) (95% - 100%)    Parameters below as of 12 Feb 2023 05:10  Patient On (Oxygen Delivery Method): room air        PHYSICAL EXAM:  GENERAL: no distress  PSYCH: A&O x3  HEAD: Atraumatic, Normocephalic  NECK: Supple, No JVD  CHEST/LUNG: clear to auscultation bilaterally  HEART: regular rate and rhythm, no murmurs  ABDOMEN: nontender to palpation, no rebound tenderness/guarding  EXTREMITIES: no edema on bilateral LE  NEUROLOGY: no focal neurologic deficit  SKIN: No rashes or lesions    LABS:  All labs personally Reviewed.    RADIOLOGY & ADDITIONAL TESTS:  All imaging personally Reviewed.

## 2023-02-12 NOTE — PROGRESS NOTE ADULT - PROBLEM SELECTOR PLAN 3
DVT ppx -   lovenox qd   -PT/ambulate/oob to chair.   Dispo: d/c home today  D/W daughter at bedside, all questions answered at length

## 2023-02-12 NOTE — PROGRESS NOTE ADULT - PROBLEM SELECTOR PLAN 1
late presentation of MI. [facility] with 100% stenosis of LAD w/ distal collaterals S/P viability study  EF 36% per imaging  - c/w entresto low dose, metoprolol xl 12.5 mg po qday. TITRATING as BP allows given hypotension SBP 80s-90s.  - Holding lasix currently, likely will need 20 mg every other day on discharge  - Continue with ASA 81 mg PO Qd and brilinta 90 mg PO BID for DAPT, atorvastatin 80 mg PO Qd

## 2023-02-12 NOTE — PROGRESS NOTE ADULT - PROBLEM SELECTOR PLAN 3
- late presentation anterior wall MI (1/21/23)  - [facility]  1/22/23: pLAD 100%, mCx 50%, OM2 40%  - trops, LFTs downtrending since event 2 weeks prior  - denies anginal symptoms since event

## 2023-02-12 NOTE — PROGRESS NOTE ADULT - PROBLEM SELECTOR PLAN 1
- HFrEF 2/2 ICM (late presentation anterior MI 1/21/23 follows w/ Dr. Bradley Baron)  - TTE 1/22/23: EF 41%, distal septum, anterolateral wall akinetic   - dyspnea when lying flat, crackles, BNP 10K from 2K two weeks prior  - switch from brilinta to plavix per Dr. Baron  - appreciate card recs  - had allergic reaction to lasix  - c/w home entresto 24/26 BID, toprol 12.5 qD  - c/w metolazone per cards, consider ethacrynic acid if no response  - s/p metolazone  - strict I&Os, replete lytes K > 4, Mg > 2  - monitor on tele Assessment:  - HFrEF 2/2 ICM (late presentation anterior MI 1/21/23 follows w/ Dr. Bradley Baron)  - TTE 1/22/23: EF 41%, distal septum, anterolateral wall akinetic   - dyspnea when lying flat, crackles, BNP 10K from 2K two weeks prior  - switch from brilinta to plavix per Dr. Baron  - appreciate card recs  - had allergic reaction to lasix    Plan:  - orthopnea improved with diuresis  - holding entresto per patient agreement w/ Dr. Baron  - c/w home toprol 12.5 QHS per patient request   - dose diuretic per cards  - strict I&Os, standing daily weights, replete lytes K > 4, Mg > 2  - monitor on tele Assessment:  - HFrEF 2/2 ICM (late presentation anterior MI 1/21/23 follows w/ Dr. Bradley Baron)  - TTE 1/22/23: EF 41%, distal septum, anterolateral wall akinetic   - dyspnea when lying flat, crackles, BNP 10K from 2K two weeks prior  - switch from brilinta to plavix per Dr. Baron  - appreciate card recs  - had allergic reaction to lasix  - suspect HFrEF exacerbation but likely significant contribution from anxiety as well    Plan:  - orthopnea improved with diuresis  - holding entresto per patient agreement w/ Dr. Baron  - c/w home toprol 12.5 QHS per patient request   - dose diuretic per cards  - strict I&Os, standing daily weights, replete lytes K > 4, Mg > 2  - monitor on tele

## 2023-02-12 NOTE — PROGRESS NOTE ADULT - ASSESSMENT
67F recently admitted to [facility] with late presentation anterior MI (Jan 2022), CAD w/ 100% pLAD, HFrEF (EF 40%) likely ICM presenting with persistent abdominal discomfort and shortness of breath at night.

## 2023-02-12 NOTE — PROGRESS NOTE ADULT - SUBJECTIVE AND OBJECTIVE BOX
HPI:    67F with history of late presenting STEMI with total occlusion of prox LAD (not revascularized), HFrEF (EF 36%) who comes in for shortness of breath. The patient was recently discharged after she presented with anterior wall stemi with 100% occulsion of pLAD which was not revascularized. She underwent cardiac MRI at the time and decision was made to treat medically. She was recently seen by Dr. Baron in cardiology clinic and was fluid overloaded and so she was started on lasix. She was improving with lasix but developped hives throughout her body without evidence of angioedema and so she was instructed to stop. She ntoes that she has been short of breath on exertion and has had some orthopnea noting that she is not able to get comfortable while flat. She had one episode of PND today. She has not ahd any chest pain.  She denies any recent illnesses or ill contacts.     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     CT/MRI:   23 (MRI viability) - image quality markedly degraded due to motion artifact, in particular,   the late gadolinium enhancement sequences are limited due to motion; transmural scarring involving the mid anterior wall of the left ventricle; there is likely microvascular obstruction involving the left ventricular apical anterior wall and majority of the septal wall, LVEF 36%     Cardiac Cath/PCI:   23 (CATH) - pLAD 100% (faint collaterals from RPDA), mCx 50%, pOM2 40%, mRCA 20%     PMHx:   No pertinent past medical history        PSHx:       Allergies:  Lasix (Rash)      Home Meds:    · 	furosemide 40 mg oral tablet: 1 tab(s) orally every other day   · 	metoprolol succinate 25 mg oral capsule, extended release: 0.5 cap(s) orally once a day   · 	aspirin 81 mg oral tablet, chewable: 1 tab(s) orally once a day  · 	atorvastatin 80 mg oral tablet: 1 tab(s) orally once a day (at bedtime)  · 	sacubitril-valsartan 24 mg-26 mg oral tablet: 1 tab(s) orally 2 times a day  · 	ticagrelor 90 mg oral tablet: 1 tab(s) orally every 12 hours    Current Medications:       FAMILY HISTORY:      Social History:  Not reviewed    REVIEW OF SYSTEMS:  Constitutional:     [ ] negative [ ] fevers [ ] chills [ ] weight loss [ ] weight gain  HEENT:                  [ ] negative [ ] dry eyes [ ] eye irritation [ ] postnasal drip [ ] nasal congestion  CV:                         [ ] negative  [ ] chest pain [ ] orthopnea [ ] palpitations [ ] murmur  Resp:                     [ ] negative [ ] cough [ ] shortness of breath [ ] dyspnea [ ] wheezing [ ] sputum [ ]hemoptysis  GI:                          [ ] negative [ ] nausea [ ] vomiting [ ] diarrhea [ ] constipation [ ] abd pain [ ] dysphagia   :                        [ ] negative [ ] dysuria [ ] nocturia [ ] hematuria [ ] increased urinary frequency  Musculoskeletal: [ ] negative [ ] back pain [ ] myalgias [ ] arthralgias [ ] fracture  Skin:                       [ ] negative [ ] rash [ ] itch  Neurological:        [ ] negative [ ] headache [ ] dizziness [ ] syncope [ ] weakness [ ] numbness  Psychiatric:           [ ] negative [ ] anxiety [ ] depression  Endocrine:            [ ] negative [ ] diabetes [ ] thyroid problem  Heme/Lymph:      [ ] negative [ ] anemia [ ] bleeding problem  Allergic/Immune: [ ] negative [ ] itchy eyes [ ] nasal discharge [ ] hives [ ] angioedema    [ ] All other systems negative  [ ] Unable to assess ROS due to      Physical Exam:  T(F): 98.4 (), Max: 98.4 ()  HR: 109 () (109 - 120)  BP: 108/85 () (108/85 - 130/84)  RR: 22 ()  SpO2: 97% ()  GENERAL: No acute distress, well-developed  HEAD:  Atraumatic, Normocephalic  ENT: ++ JVD  Lungs: Crackles in lower fields  BACK: No spinal tenderness  HEART: Regular rate and rhythm; No murmurs, rubs, or gallops  ABDOMEN: Soft, Nontender, Nondistended; Bowel sounds present  EXTREMITIES:  No clubbing, cyanosis. Trace edema      Cardiovascular Diagnostic Testing:    ECG: Personally reviewed: sinus rhythm with Q waves in v1-v4    TTE with Doppler (w/Cont) (23 @ 07:20)     Patient name: REGINALD MARK  YOB: 1955   Age: 67 (F)   MR#: [id number]  Study Date: 2023  Location: O/PSonographer: AKI Cueto  Study quality: Technically good  ------------------------------------------------------------------------  Dimensions:    Normal Values:  LA:     3.9    2.0 - 4.0 cm  Ao:     2.7    2.0 - 3.8 cm  SEPTUM: 0.6    0.6 - 1.2 cm  PWT:    0.9    0.6 - 1.1 cm  LVIDd:  4.9    3.0 - 5.6 cm  LVIDs:  4.0    1.8 - 4.0 cm  Derived variables:  LVMI: 67 g/m2  RWT: 0.36  Fractional short: 18 %  EF (Alcala Rule): 41 %Doppler Peak Velocity (m/sec):  AoV=1.4  ------------------------------------------------------------------------  Conclusions:  1. Mild mitral annular calcification. Tethered mitral valve leaflets with normal opening. Mild mitral regurgitation.  2. Calcified trileaflet aortic valve with normal opening. Minimal aortic regurgitation.  3. Endocardial visualization enhanced with intravenous injection of Ultrasonic Enhancing Agent (Definity). Moderate segmental left ventricular systolic dysfunction. The distal septum, distal anterolateral, distal inferior, mid to distal anterior, distal inferolateral, basal to mid anteroseptal, and the apex are akinetic. 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 No left ventricular thrombus.  4. Normal right ventricular size and function.  Results [facility] with [facility] fellow.  *** No previous Echo exam.  ------------------------------------------------------------------------  Confirmed on  2023 - 12:21:47 by Konrad Fortune M.D.        Cardiac Catheterization (23 @ 11:02)   Study Date:     2023   Name:           REGINALD MARK   :            1955   (67 years)   Gender:         female     Cath Lab Report    Diagnostic Cardiologist:       Bradley Baron MD   Fellow:                        Tami Franklin MD   Referring Physician:           Elvira Sosa MD     Procedures Performed   Procedures:                 1.    Ultrasound Guided Access   2.    Arterial Access - Right Femoral   3.    Diagnostic Coronary Angiography   4.    Left Heart Cath     Indications:                Late presentation STEMI     Diagnostic Conclusions:   Proximal LAD occlusion. Moderate atherosclerosis in mid Cx and OM2.     Recommendations:   Patient with late presentation anterior STEMI. Currently asymptomatic (denies chest pain or SOB). Optimize medical therapy for ischemic cardiomyopathy. Consider viability testing to guide further management of occluded LAD.      Diagnostic Findings:     Coronary Angiography   The coronary circulation is right dominant. Cardiac catheterization was performed urgently.    LM   Left main artery: Normal. Short.      LAD   Proximal left anterior descending: The distal vessel is supplied by faint collaterals from the Right posterior descending artery.  There is a 100 % stenosis. MARTHA Flow 0.      CX   Mid circumflex: There is a 50 % stenosis in the middle third portion of the segment after branch to OM1. MARTHA Flow 3. Second obtuse marginal: There is a 40 % stenosis in the proximal third portion of the segment.    RCA   Mid right coronary artery: There is a 20 % stenosis in the middle third portion of the segment.    Left Heart Cath   Left ventricular function was not assessed.  Imaging:    CXR: Personally reviewed      FINDINGS:  The cardiomediastinal silhouette is not accurately assessed in this AP projection.  Small right pleural effusion. Left basilar subsegmental atelectasis.  No pneumothorax.  No acute bony abnormality.    IMPRESSION:  Small right pleural effusion.      Labs: Personally reviewed                         )-----------( 631      ( 2023 12:23 )             37.9     02-    142  |  105  |  12  ----------------------------<  106<H>  4.0   |  26  |  0.77    Ca    9.4      2023 12:23    TPro  7.9  /  Alb  3.6  /  TBili  0.5  /  DBili  x   /  AST  11  /  ALT  16  /  AlkPhos  161<H>        Serum Pro-Brain Natriuretic Peptide: 30834 pg/mL ( @ 12:23)            CARDIAC MARKERS ( 2023 12:23 )  303 ng/L / x     / x     / x     / x     / x          Lasix (Rash)      T(F): 98.4 (), Max: 98.4 ()  HR: 109 () (109 - 120)  BP: 108/85 () (108/85 - 130/84)  RR: 22 ()  SpO2: 97% ()    Assessment and Recommendation:   · Assessment      67F with history of late presenting STEMI with total occlusion of prox LAD (not revascularized), HFrEF (EF 36%) who comes in for shortness of breath. Exam significant for fluid overload. She was recently seen by Dr. Baron and was started on lasix. She was improving with lasix bu could not tolerate due to allergy. Currently x-ray significant for pulmonary edema. BNP elevated at 10,000 with baseline .     #HFrEF:    - Continue Entresto 24/ bid if BP tolerates. hold for SBP <85  - Continue toprol 12.5 xl daily   - patient hesitant to start SGLT2i inpatient and would defer this and MRA to outpatient.   - Re-start ethacrynic acid - continiue and assess for insurance coverage as outpatient            - Consider D/c on spironolactone       #Prior STEMI: cath and viability study reviewed.   patient is currently chest pain free.   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 Discussed with Dr. Baron- plan for outpatient follow up consideration for revascularization and repeat viability study  - c/w ASA and statin  -Per Dr. Baron's last outpatient note, switch ticagrelor to plavix 75mg daily      TIMOTHY Pa                                                                                                                                                                                                                                Forty-1 minutes spent in patient care management

## 2023-02-12 NOTE — PATIENT PROFILE ADULT - FALL HARM RISK - HARM RISK INTERVENTIONS

## 2023-02-13 ENCOUNTER — TRANSCRIPTION ENCOUNTER (OUTPATIENT)
Age: 68
End: 2023-02-13

## 2023-02-13 LAB
ANION GAP SERPL CALC-SCNC: 14 MMOL/L — SIGNIFICANT CHANGE UP (ref 5–17)
BLD GP AB SCN SERPL QL: NEGATIVE — SIGNIFICANT CHANGE UP
BUN SERPL-MCNC: 18 MG/DL — SIGNIFICANT CHANGE UP (ref 7–23)
CALCIUM SERPL-MCNC: 9.1 MG/DL — SIGNIFICANT CHANGE UP (ref 8.4–10.5)
CHLORIDE SERPL-SCNC: 100 MMOL/L — SIGNIFICANT CHANGE UP (ref 96–108)
CO2 SERPL-SCNC: 25 MMOL/L — SIGNIFICANT CHANGE UP (ref 22–31)
CREAT SERPL-MCNC: 0.86 MG/DL — SIGNIFICANT CHANGE UP (ref 0.5–1.3)
EGFR: 74 ML/MIN/1.73M2 — SIGNIFICANT CHANGE UP
GLUCOSE SERPL-MCNC: 121 MG/DL — HIGH (ref 70–99)
HCT VFR BLD CALC: 31.4 % — LOW (ref 34.5–45)
HGB BLD-MCNC: 9.9 G/DL — LOW (ref 11.5–15.5)
MAGNESIUM SERPL-MCNC: 2.2 MG/DL — SIGNIFICANT CHANGE UP (ref 1.6–2.6)
MCHC RBC-ENTMCNC: 28.1 PG — SIGNIFICANT CHANGE UP (ref 27–34)
MCHC RBC-ENTMCNC: 31.5 GM/DL — LOW (ref 32–36)
MCV RBC AUTO: 89.2 FL — SIGNIFICANT CHANGE UP (ref 80–100)
MRSA PCR RESULT.: SIGNIFICANT CHANGE UP
NRBC # BLD: 0 /100 WBCS — SIGNIFICANT CHANGE UP (ref 0–0)
PLATELET # BLD AUTO: 519 K/UL — HIGH (ref 150–400)
POTASSIUM SERPL-MCNC: 3.6 MMOL/L — SIGNIFICANT CHANGE UP (ref 3.5–5.3)
POTASSIUM SERPL-SCNC: 3.6 MMOL/L — SIGNIFICANT CHANGE UP (ref 3.5–5.3)
RBC # BLD: 3.52 M/UL — LOW (ref 3.8–5.2)
RBC # FLD: 14.9 % — HIGH (ref 10.3–14.5)
RH IG SCN BLD-IMP: POSITIVE — SIGNIFICANT CHANGE UP
S AUREUS DNA NOSE QL NAA+PROBE: SIGNIFICANT CHANGE UP
SODIUM SERPL-SCNC: 139 MMOL/L — SIGNIFICANT CHANGE UP (ref 135–145)
WBC # BLD: 8.18 K/UL — SIGNIFICANT CHANGE UP (ref 3.8–10.5)
WBC # FLD AUTO: 8.18 K/UL — SIGNIFICANT CHANGE UP (ref 3.8–10.5)

## 2023-02-13 PROCEDURE — 93306 TTE W/DOPPLER COMPLETE: CPT | Mod: 26

## 2023-02-13 PROCEDURE — 99233 SBSQ HOSP IP/OBS HIGH 50: CPT | Mod: GC

## 2023-02-13 PROCEDURE — 99233 SBSQ HOSP IP/OBS HIGH 50: CPT

## 2023-02-13 PROCEDURE — 93010 ELECTROCARDIOGRAM REPORT: CPT

## 2023-02-13 RX ORDER — ATORVASTATIN CALCIUM 80 MG/1
40 TABLET, FILM COATED ORAL ONCE
Refills: 0 | Status: COMPLETED | OUTPATIENT
Start: 2023-02-13 | End: 2023-02-13

## 2023-02-13 RX ORDER — POTASSIUM CHLORIDE 20 MEQ
40 PACKET (EA) ORAL ONCE
Refills: 0 | Status: COMPLETED | OUTPATIENT
Start: 2023-02-13 | End: 2023-02-13

## 2023-02-13 RX ORDER — ETHACRYNIC ACID 25 MG/1
2 TABLET ORAL
Qty: 60 | Refills: 0
Start: 2023-02-13 | End: 2023-03-14

## 2023-02-13 RX ORDER — CHLORHEXIDINE GLUCONATE 213 G/1000ML
1 SOLUTION TOPICAL
Refills: 0 | Status: DISCONTINUED | OUTPATIENT
Start: 2023-02-13 | End: 2023-02-21

## 2023-02-13 RX ORDER — ASPIRIN/CALCIUM CARB/MAGNESIUM 324 MG
81 TABLET ORAL DAILY
Refills: 0 | Status: DISCONTINUED | OUTPATIENT
Start: 2023-02-13 | End: 2023-02-21

## 2023-02-13 RX ORDER — ATORVASTATIN CALCIUM 80 MG/1
80 TABLET, FILM COATED ORAL DAILY
Refills: 0 | Status: DISCONTINUED | OUTPATIENT
Start: 2023-02-14 | End: 2023-02-14

## 2023-02-13 RX ORDER — POTASSIUM CHLORIDE 20 MEQ
10 PACKET (EA) ORAL ONCE
Refills: 0 | Status: COMPLETED | OUTPATIENT
Start: 2023-02-13 | End: 2023-02-13

## 2023-02-13 RX ORDER — ASPIRIN/CALCIUM CARB/MAGNESIUM 324 MG
81 TABLET ORAL DAILY
Refills: 0 | Status: DISCONTINUED | OUTPATIENT
Start: 2023-02-13 | End: 2023-02-13

## 2023-02-13 RX ADMIN — Medication 100 MILLIEQUIVALENT(S): at 18:10

## 2023-02-13 RX ADMIN — CLOPIDOGREL BISULFATE 75 MILLIGRAM(S): 75 TABLET, FILM COATED ORAL at 06:09

## 2023-02-13 RX ADMIN — ATORVASTATIN CALCIUM 40 MILLIGRAM(S): 80 TABLET, FILM COATED ORAL at 06:08

## 2023-02-13 RX ADMIN — Medication 30 MILLILITER(S): at 05:40

## 2023-02-13 RX ADMIN — Medication 81 MILLIGRAM(S): at 14:44

## 2023-02-13 RX ADMIN — Medication 30 MILLILITER(S): at 20:54

## 2023-02-13 RX ADMIN — ETHACRYNIC ACID 50 MILLIGRAM(S): 25 TABLET ORAL at 11:37

## 2023-02-13 RX ADMIN — Medication 25 MILLIGRAM(S): at 11:34

## 2023-02-13 NOTE — DISCHARGE NOTE PROVIDER - PROVIDER TOKENS
PROVIDER:[TOKEN:[[id number]],FOLLOWUP:[2 weeks],ESTABLISHEDPATIENT:[T]] PROVIDER:[TOKEN:[[id number]],FOLLOWUP:[2 weeks],ESTABLISHEDPATIENT:[T]],PROVIDER:[TOKEN:[[id number]],FOLLOWUP:[1 week],ESTABLISHEDPATIENT:[T]],PROVIDER:[TOKEN:[[id number]],FOLLOWUP:[Routine]]

## 2023-02-13 NOTE — DIETITIAN INITIAL EVALUATION ADULT - PERTINENT MEDS FT
MEDICATIONS  (STANDING):  aspirin  chewable 81 milliGRAM(s) Oral daily  atorvastatin 80 milliGRAM(s) Oral at bedtime  chlorhexidine 2% Cloths 1 Application(s) Topical <User Schedule>  clopidogrel Tablet 75 milliGRAM(s) Oral daily  ethacrynic acid 50 milliGRAM(s) Oral daily  metoprolol succinate ER 25 milliGRAM(s) Oral daily  pantoprazole    Tablet 40 milliGRAM(s) Oral before breakfast  potassium chloride   Powder 40 milliEquivalent(s) Oral once    MEDICATIONS  (PRN):  acetaminophen     Tablet .. 650 milliGRAM(s) Oral every 6 hours PRN Temp greater or equal to 38C (100.4F), Mild Pain (1 - 3)  aluminum hydroxide/magnesium hydroxide/simethicone Suspension 30 milliLiter(s) Oral every 4 hours PRN Dyspepsia  melatonin 3 milliGRAM(s) Oral at bedtime PRN Insomnia

## 2023-02-13 NOTE — DIETITIAN INITIAL EVALUATION ADULT - REASON INDICATOR FOR ASSESSMENT
Nutrition Consult for assessment/education.   Source: Pt, Pt's daughter at bedside, Electronic Medical Record.   Chart reviewed, events noted.

## 2023-02-13 NOTE — PROGRESS NOTE ADULT - SUBJECTIVE AND OBJECTIVE BOX
Cardiology Progress Note  ------------------------------------------------------------------------------------------  SUBJECTIVE:   No events overnight. Denies CP, SOB or Palpitations.   -------------------------------------------------------------------------------------------  ROS:  CV: chest pain (-), palpitation (-), orthopnea (-), PND (-), edema (-)  PULM: SOB (-), cough (-), wheezing (-), hemoptysis (-).   CONST: fever (-), chills (-) or fatigue (-)  GI: abdominal distension (-), abdominal pain (-) , nausea/vomiting (-), hematemesis, (-), melena (-), hematochezia (-)  : dysuria (-), frequency (-), hematuria (-).   NEURO: numbness (-), weakness (-), dizziness (-)  SKIN: itching (-), rash (-)  HEENT:  visual changes (-); vertigo or throat pain (-);  neck stiffness (-)     All other review of systems is negative unless indicated above.   -------------------------------------------------------------------------------------------  VS:  T(F): 97.9 (02-13), Max: 98.2 (02-12)  HR: 112 (02-13) (100 - 120)  BP: 104/62 (02-13) (91/57 - 106/73)  RR: 18 (02-13)  SpO2: 96% (02-13)  I&O's Summary    13 Feb 2023 07:01  -  13 Feb 2023 12:49  --------------------------------------------------------  IN: 310 mL / OUT: 0 mL / NET: 310 mL      ------------------------------------------------------------------------------------------  PHYSICAL EXAM:  GENERAL: NAD  HEAD:  Atraumatic, Normocephalic.  EYES: EOMI, PERRLA, conjunctiva and sclera clear.  ENT: Moist mucous membranes.  NECK: Supple, No JVD.  CHEST/LUNG: Clear to auscultation bilaterally; No rales, rhonchi, wheezing, or rubs. Unlabored respirations.  HEART: Regular rate and rhythm; No murmurs, rubs, or gallops.  ABDOMEN: Bowel sounds present; Soft, Nontender, Nondistended.   EXTREMITIES:  2+ Peripheral Pulses, brisk capillary refill. No clubbing, cyanosis, or edema.  PSYCH: Normal affect.  SKIN: No rashes or lesions.  -------------------------------------------------------------------------------------------  LABS:                       9.9    8.18  )-----------( 519      ( 13 Feb 2023 07:00 )             31.4     02-13    139  |  100  |  18  ----------------------------<  121<H>  3.6   |  25  |  0.86    Ca    9.1      13 Feb 2023 07:00  Phos  3.8     02-12  Mg     2.2     02-13    TPro  7.4  /  Alb  3.0<L>  /  TBili  0.4  /  DBili  x   /  AST  15  /  ALT  12  /  AlkPhos  131<H>  02-12    CARDIAC MARKERS ( 12 Feb 2023 20:33 )  293 ng/L / x     / x     / x     / x     / x      CARDIAC MARKERS ( 11 Feb 2023 12:23 )  303 ng/L / x     / x     / x     / x     / x        -------------------------------------------------------------------------------------------  Meds:  acetaminophen     Tablet .. 650 milliGRAM(s) Oral every 6 hours PRN  aluminum hydroxide/magnesium hydroxide/simethicone Suspension 30 milliLiter(s) Oral every 4 hours PRN  aspirin  chewable 81 milliGRAM(s) Oral daily  atorvastatin 80 milliGRAM(s) Oral at bedtime  chlorhexidine 2% Cloths 1 Application(s) Topical <User Schedule>  clopidogrel Tablet 75 milliGRAM(s) Oral daily  ethacrynic acid 50 milliGRAM(s) Oral daily  melatonin 3 milliGRAM(s) Oral at bedtime PRN  metoprolol succinate ER 25 milliGRAM(s) Oral daily  pantoprazole    Tablet 40 milliGRAM(s) Oral before breakfast  potassium chloride   Powder 40 milliEquivalent(s) Oral once    -------------------------------------------------------------------------------------------    TTE with Doppler (w/Cont) (01.22.23 @ 07:20)   EF (Alcala Rule): 41 %Doppler Peak Velocity (m/sec):  AoV=1.4  ------------------------------------------------------------------------  Conclusions:  1. Mild mitral annular calcification. Tethered mitral valve leaflets with normal opening. Mild mitral regurgitation.  2. Calcified trileaflet aortic valve with normal opening. Minimal aortic regurgitation.  3. Endocardial visualization enhanced with intravenous injection of Ultrasonic Enhancing Agent (Definity). Moderate segmental left ventricular systolic dysfunction. The distal septum, distal anterolateral, distal inferior, mid to distal anterior, distal inferolateral, basal to mid anteroseptal, and the apex are akinetic. 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 No left ventricular thrombus.  4. Normal right ventricular size and function.  [facility].  *** No previous Echo exam.  ------------------------------------------------------------------------    Cardiac Catheterization (01.22.23 @ 11:02)     Cath Lab Report    Diagnostic Cardiologist:       Bradley Baron MD   Fellow:                        Tami Franklin MD   Referring Physician:           Elvira Sosa MD       Indications:                Late presentation STEMI     Diagnostic Conclusions:   Proximal LAD occlusion. Moderate atherosclerosis in mid Cx and OM2.     Recommendations:   Patient with late presentation anterior STEMI. Currently asymptomatic (denies chest pain or SOB). Optimize medical therapy for ischemic cardiomyopathy. Consider viability testing to guide further management of occluded LAD.    Diagnostic Findings:     Coronary Angiography   The coronary circulation is right dominant. Cardiac catheterization was performed urgently.    LM   Left main artery: Normal. Short.      LAD   Proximal left anterior descending: The distal vessel is supplied by faint collaterals from the Right posterior descending artery.  There is a 100 % stenosis. MARTHA Flow 0.      CX   Mid circumflex: There is a 50 % stenosis in the middle third portion of the segment after branch to OM1. MARTHA Flow 3. Second obtuse marginal: There is a 40 % stenosis in the proximal third portion of the segment.    RCA   Mid right coronary artery: There is a 20 % stenosis in the middle third portion of the segment.    Left Heart Cath   Left ventricular function was not assessed.  Imaging:    CXR  FINDINGS:  The cardiomediastinal silhouette is not accurately assessed in this AP projection.  Small right pleural effusion. Left basilar subsegmental atelectasis.  No pneumothorax.  No acute bony abnormality.    IMPRESSION:  Small right pleural effusion.

## 2023-02-13 NOTE — PROGRESS NOTE ADULT - ASSESSMENT
67F with history of late presenting STEMI with total occlusion of prox LAD (not revascularized), HFrEF (EF 36%) who comes in for shortness of breath. Exam significant for fluid overload. She was recently seen by Dr. Baron and was started on lasix. She was improving with lasix bu could not tolerate due to allergy. Currently x-ray significant for pulmonary edema. BNP elevated at 10,000 with baseline 2000.     #HFrEF:    - Continue Entresto 24/26 bid if BP tolerates. hold for SBP <85  - Continue toprol 12.5 xl daily   - Patient is relatively tachycardic with Hgb drop from 11.2 to 9.9, closely monitor CBC and anemia workup  - patient hesitant to start SGLT2i inpatient and would defer this and MRA to outpatient.   - Continue ethacrynic acid 50mg daily  - Strict I/Os and daily weights    #Prior STEMI: cath and viability study reviewed.   patient is currently chest pain free.   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 Discussed with Dr. Baron- plan for outpatient follow up consideration for revascularization and repeat viability study  - c/w ASA  - Clopidogrel 75mg daily  - Lipitor 80mg daily  - Per Dr. Baron's last outpatient note, switch ticagrelor to plavix 75mg daily    Recommendations are preliminary until attending attestation.    Clayton Gross MD  Cardiology Fellow- PGY 4

## 2023-02-13 NOTE — PROGRESS NOTE ADULT - ASSESSMENT
67F recently admitted to [facility] with late presentation anterior MI (Jan 2022), CAD w/ 100% pLAD, HFrEF (EF 40%) likely ICM presenting with persistent abdominal discomfort and shortness of breath at night.   67F recently admitted to [facility] with late presentation anterior MI (Jan 2022), CAD w/ 100% pLAD, HFrEF (EF 40%) likely ICM presenting with persistent abdominal discomfort and shortness of breath at night, is on ethacrynic acid with improvement in dyspnea, but continued orthopnea.

## 2023-02-13 NOTE — DIETITIAN INITIAL EVALUATION ADULT - PERTINENT LABORATORY DATA
02-13    139  |  100  |  18  ----------------------------<  121<H>  3.6   |  25  |  0.86    Ca    9.1      13 Feb 2023 07:00  Phos  3.8     02-12  Mg     2.2     02-13    TPro  7.4  /  Alb  3.0<L>  /  TBili  0.4  /  DBili  x   /  AST  15  /  ALT  12  /  AlkPhos  131<H>  02-12  A1C with Estimated Average Glucose Result: 5.4 % (01-22-23 @ 01:04)

## 2023-02-13 NOTE — DIETITIAN INITIAL EVALUATION ADULT - PERSON TAUGHT/METHOD
Discussed DASH diet education per pt/pt's daughter request/inquiry. Reviewed ways to decrease Na in your diet, discussed meal and snack options, tips for eating out, tips for cooking, discussed reading food labels for Sodium. Good comprehension noted. RD addressed all questions and Pt/pt's daughter made aware RD remains available PRN./verbal instruction/patient instructed/daughter instructed

## 2023-02-13 NOTE — DISCHARGE NOTE PROVIDER - NSDCFUSCHEDAPPT_GEN_ALL_CORE_FT
Bradley Baron  [facility] Physician [facility]  CARDIOLOGY [id number]  Scheduled Appointment: 03/01/2023

## 2023-02-13 NOTE — DISCHARGE NOTE PROVIDER - CARE PROVIDER_API CALL
Bradley Baron)  Cardiovascular Disease; Internal Medicine; Interventional Cardiology  [address] [place] [address], [address]  [place] [address]  Phone: [phone]  Fax: [phone]  Established Patient  Follow Up Time: 2 weeks   Bradley Baron)  Cardiovascular Disease; Internal Medicine; Interventional Cardiology  [address] [place] [address], [address]  [place] [address]  Phone: [phone]  Fax: [phone]  Established Patient  Follow Up Time: 2 weeks    Konrad Aviles)  Internal Medicine  [address] [place] [address]  Phone: [phone]  Fax: [phone]  Established Patient  Follow Up Time: 1 week    Lester Ward)  Adv Heart Fail Trnsplnt Cardio; Cardiovascular Disease; Internal Medicine  [address] [place] [address]  Phone: [phone]  Fax: [phone]  Follow Up Time: Routine

## 2023-02-13 NOTE — DISCHARGE NOTE PROVIDER - NSDCCPTREATMENT_GEN_ALL_CORE_FT
PRINCIPAL PROCEDURE  Procedure: Transthoracic echo  Findings and Treatment: Fractional short: 16 %  EF (Alcala Rule): 19 %Doppler Peak Velocity (m/sec):  AoV=1.5  Conclusions:  1. Severe segmental left ventricular systolic dysfunction.  No left ventricular thrombus. Endocardial visualization  enhanced with intravenous injection of Ultrasonic Enhancing  Agent (Definity).The mid-apical anterior wall, mid to  apical anterolateral wall, all apical segments and septum  are severely hypokinetic to akinetic.  The inferior wal is  hypokinetic. 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 No thrombus  is seen.  Alcala's done on 119 and 126; EF 19%  2. Moderate diastolic dysfunction (Stage II). Increased  E/e'  is consistent with elevated left ventricular filling  pressure.  3. Normal right ventricular size and borderline RV  function. RV S' 9.57cm/s  4. Normal tricuspid valve. Mild-moderate tricuspid  regurgitation.  5. Estimated pulmonary artery systolic pressure equals 48  mm Hg, assuming right atrial pressure equals 8 mm Hg,  consistent with mild pulmonary pressures.  *** Compared with echocardiogram of 1/22/2023, The ejection  fraction had decreased, the wall motion abnormalities have  worsened and the filling pressures are now elevated.

## 2023-02-13 NOTE — DIETITIAN INITIAL EVALUATION ADULT - REASON
Pt with no overt signs of muscle wasting or fat loss. Pt with reported adequate PO intake and denies significant weight changes.

## 2023-02-13 NOTE — DISCHARGE NOTE PROVIDER - NSDCHC_MEDRECSTATUS_GEN_ALL_CORE
"Angelo Pierson  (28 y.o. Female)     Date of Birth Social Security Number Address Home Phone MRN    1989  [address] [facility] [phone] [id number]    Islam Marital Status          Faith Single       Admission Date Admission Type Admitting Provider Attending Provider Department, Room/Bed    7/8/18 Richy Cooper MD  [facility] 3A, 344/1    Discharge Date Discharge Disposition Discharge Destination        7/11/2018 Home or Self Care              Attending Provider:  (none)   Allergies:  Aspirin, Nsaids, Erythromycin, Hydrocodone    Isolation:  None   Infection:  None   Code Status:  CPR    Ht:  160 cm (63\")   Wt:  98.2 kg (216 lb 8 oz)    Admission Cmt:  None   Principal Problem:  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 [id number]     Payor Plan Address Payor Plan Phone Number Effective From Effective To    [address] [phone] 1/1/2018     [facility] [id number]       Subscriber Name Subscriber Birth Date Member ID       ANGELO PIERSON 1989 [id number]                 Emergency Contacts      (Rel.) Home Phone Work Phone Mobile Phone    Zina John (Mother) [phone] -- --               Discharge Summary      Richy Espinoza MD at 7/11/2018  6:51 AM            Date of Discharge:  7/11/2018    Discharge Diagnosis: McArdle's with mild rhabdo    Presenting Problem/History of Present Illness  McArdle disease (CMS/HCC) [E74.04]  McArdle disease (CMS/HCC) [E74.04]       Hospital Course  Patient is a 28 y.o. female presented with exacerbation of her McArdle's disease.  She presented with generalized muscular pain, elevated CK level, intractable nausea and vomiting, along with abdominal pain.  She had difficulty keeping things down.  " She had no fever.  Because of the excessive hypovolemia due to the nausea and vomiting she began to have mild rhabdomyolysis in part due to her glycogen storage disease.  She was admitted for aggressive hydration, control of her nausea and vomiting, and pain control.  Saw a peak in her CK level of 8000 with slow defervesced to 2000 and discharge.  She remained a little sore but was tolerating oral medications and fluids.  It is felt at this time she is maximized acute inpatient stay and plan is made for discharge home.      Procedures Performed         Consults:   Consults     No orders found from 6/9/2018 to 7/9/2018.          Pertinent Test Results: labs: CK levels max at 8400 and now at 2400    Condition on Discharge:  Stable    Vital Signs  Temp:  [97.7 °F (36.5 °C)-98.5 °F (36.9 °C)] 98 °F (36.7 °C)  Heart Rate:  [66-98] 98  Resp:  [1-20] 20  BP: ()/(55-96) 111/61    Physical Exam:   General Appearance: alert and appears stated age  Eyes: conjunctivae and sclerae normal and no icterus  Throat: gums healthy  Neck: suppple and trachea midline  Lungs: clear to auscultation, respirations regular, respirations even and respirations unlabored  Heart: regular rhythm & normal rate and normal S1, S2  Abdomen: normal bowel sounds, soft non-tender and no guarding  Extremities: moves extremities well, no edema, no cyanosis and no redness  Skin: no bleeding, bruising or rash  Neurologic: Mental Status orientated to person, place, time and situation    Discharge Disposition  Home or Self Care    Discharge Medications     Discharge Medications      New Medications      Instructions Start Date   metaxalone 800 MG tablet  Commonly known as:  SKELAXIN   800 mg, Oral, 3 Times Daily PRN         Changes to Medications      Instructions Start Date   promethazine 25 MG tablet  Commonly known as:  PHENERGAN  What changed:  Another medication with the same name was added. Make sure you understand how and when to take each.   25  mg, Oral, Every 6 Hours PRN      promethazine 25 MG tablet  Commonly known as:  PHENERGAN  What changed:  You were already taking a medication with the same name, and this prescription was added. Make sure you understand how and when to take each.   25 mg, Oral, Every 4 Hours PRN         Continue These Medications      Instructions Start Date   escitalopram 10 MG tablet  Commonly known as:  LEXAPRO   10 mg, Oral, Daily      MIRENA (52 MG) 20 MCG/24HR IUD  Generic drug:  levonorgestrel   1 each, Intrauterine, Once, Inserted 3-12-18       NON FORMULARY   1 mL, Oral, Daily, Hemp oil, 1 ml for muscle fatique.       ondansetron 4 MG tablet  Commonly known as:  ZOFRAN   4 mg, Oral, Every 8 Hours PRN      tiZANidine 4 MG tablet  Commonly known as:  ZANAFLEX   4 mg, Oral, Nightly PRN      vitamin D3 5000 units capsule capsule   5,000 Units, Oral, Daily             Discharge Diet: regular    Activity at Discharge: ad nader, but do not return to work until 7/13/2018    Follow-up Appointments  No future appointments. In one week in my office      Test Results Pending at Discharge       Richy Espinoza MD  07/11/18  6:51 AM    Time: Discharge 20 min          Electronically signed by Richy Espinoza MD at 7/11/2018  6:56 AM        Admission Reconciliation is Not Complete  Discharge Reconciliation is Not Complete Admission Reconciliation is Completed  Discharge Reconciliation is Completed

## 2023-02-13 NOTE — PROGRESS NOTE ADULT - PROBLEM SELECTOR PLAN 1
Assessment:  - HFrEF 2/2 ICM (late presentation anterior MI 1/21/23 follows w/ Dr. Bradley Baron)  - TTE 1/22/23: EF 41%, distal septum, anterolateral wall akinetic   - dyspnea when lying flat, crackles, BNP 10K from 2K two weeks prior  - switch from brilinta to plavix per Dr. Baron  - appreciate card recs  - had allergic reaction to lasix  - suspect HFrEF exacerbation but likely significant contribution from anxiety as well    Plan:  - orthopnea improved with diuresis  - holding entresto per patient agreement w/ Dr. Baron  - c/w home toprol 12.5 QHS per patient request   - dose diuretic per cards  - strict I&Os, standing daily weights, replete lytes K > 4, Mg > 2  - monitor on tele Assessment:  - HFrEF 2/2 ICM (late presentation anterior MI 1/21/23 follows w/ Dr. Bradley Baron)  - TTE 1/22/23: EF 41%, distal septum, anterolateral wall akinetic   - dyspnea when lying flat, crackles, BNP 10K from 2K two weeks prior  - switch from brilinta to plavix per Dr. Baron  - appreciate card recs  - had allergic reaction to lasix  - suspect HFrEF exacerbation but likely significant contribution from anxiety as well    Plan:  - orthopnea improved with diuresis  - holding entresto per patient agreement w/ Dr. Baron  - c/w toprol 25 QHS per patient request   - dose diuretic per cards  - strict I&Os, standing daily weights, replete lytes K > 4, Mg > 2  - monitor on tele  - Monitor QTC

## 2023-02-13 NOTE — PROGRESS NOTE ADULT - ATTENDING COMMENTS
Patient seen and examined at bedside. No acute events overnight. Slowly improving, but still has some abdominal bloating/discomfort. Cardiology recommendations appreciated and currently on Ethacrynic acid. Per patient request restart aspirin and see how she tolerates. On Plavix per outpatient cards. Holding Entresto until outpatient. On telemetry 90-100s sinus rhythm. Hg is fluctuating. Monitoring QTc which is prolonged and monitoring electrolytes as well. Likely discharge soon as she is approaching as optimized as we can get her. Daughter who is an NP at bedside.

## 2023-02-13 NOTE — DIETITIAN INITIAL EVALUATION ADULT - OTHER INFO
Per pt, UBW: fluctuates between ~170-175 pounds   Dosing wt: 150 pounds - ? accuracy   Wt hx per chart: 172.6 pounds (2/13, standing)  Weight fluctuations possible partially in setting of fluid shifts (pt admitted with edema and currently prescribed loop diuretic); RD to continue to monitor weight trends as able.   Nutritionally Pertinent Meds in-house: edecrin, atorvastatin, protonix.   Nutritionally Pertinent Labs: high BG; A1c 5.4% (1/22).   - Cardio: pt with HFrEF; Prior STEMI.

## 2023-02-13 NOTE — PROGRESS NOTE ADULT - PROBLEM SELECTOR PLAN 3
- late presentation anterior wall MI (1/21/23)  - [facility]  1/22/23: pLAD 100%, mCx 50%, OM2 40%  - trops, LFTs downtrending since event 2 weeks prior  - denies anginal symptoms since event

## 2023-02-13 NOTE — DIETITIAN INITIAL EVALUATION ADULT - NSFNSADHERENCEPTAFT_GEN_A_CORE
Pt reports her doctor recently advised her to limit salt and fluid intake in setting of fluid retention and prescribed diuretics; pt and pt's daughter endorse limiting salt when cooking, reading food labels when shopping, and being more conscious of salt intake daily.

## 2023-02-13 NOTE — DIETITIAN INITIAL EVALUATION ADULT - ENERGY INTAKE
Adequate (%) In house, pt reports fair PO intake partially secondary to dislike of institutional foods; pt reports her sister brings home-cooked food from home and she consumes 100% of those meals. Flowsheets indicate adequate (%) PO intake.

## 2023-02-13 NOTE — DISCHARGE NOTE PROVIDER - HOSPITAL COURSE
67F recently admitted to [facility] with late presentation anterior MI (Jan 2022), CAD w/ 100% pLAD, HFrEF (EF 40%) likely ICM presenting with persistent abdominal discomfort and shortness of breath at night.     After discharge from hospital for anterior wall STEMI, has had persistent abdominal discomfort and increasing dyspnea when lying flat. Dyspnea is not present when upright or with exertion but has been unable to lie flat at night. Denies peripheral edema or weight gain. Denies chest pain or diaphoresis since the initial STEMI. Was seen by outpatient cardiologist Dr. Baron and was started on Lasix however developed a diffuse rash < 1 day and therefore discontinued the medication. She presents today because she woke up in the middle of the night gasping for air with worsening abdominal discomfort.    Abdominal discomfort, she describes not as pain, but with belching, early satiety, some acidic taste in the morning. Protonix has not provided her relief although holding the brilinta one night did help. Was asked to switch brilinta to plavix for this reason by Dr. Baron. 67F recently admitted to [facility] with late presentation anterior MI (Jan 2022), CAD w/ 100% pLAD, HFrEF (EF 40%) likely ICM presenting with persistent abdominal discomfort and shortness of breath at night.     After discharge from hospital for anterior wall STEMI, has had persistent abdominal discomfort and increasing dyspnea when lying flat. Dyspnea is not present when upright or with exertion but has been unable to lie flat at night. Denies peripheral edema or weight gain. Denies chest pain or diaphoresis since the initial STEMI. Was seen by outpatient cardiologist Dr. Baron and was started on Lasix however developed a diffuse rash < 1 day and therefore discontinued the medication. She presents today because she woke up in the middle of the night gasping for air with worsening abdominal discomfort.    Patient was on the floor and was diuresed. She was then transferred to the CICU for bumetanide desensitization and toleration. She tolerated the bumetanide well. While in the CICU, her blood pressures were low, therefore Entresto was stopped and she was started on Losartan. She was transferred back to telemetry floor where she remained hemodynamically stable. She was able to lay down flat without issue, continued to have apropriate saturations on Room air, and ambulated without becoming short of breath.     She had a repeat Transthoracic echocardiogram which revealed a decrease in EF to 19%. She will be medically managed as of now per HF and Cardiology.     She is to follow up with her outpatient Cardiologist and HF. 67F recently admitted to [facility] with late presentation anterior MI (Jan 2022), CAD w/ 100% pLAD, HFrEF (EF 40%) likely ICM presenting with persistent abdominal discomfort and shortness of breath at night.     After discharge from hospital for anterior wall STEMI, has had persistent abdominal discomfort and increasing dyspnea when lying flat. Dyspnea is not present when upright or with exertion but has been unable to lie flat at night. Denies peripheral edema or weight gain. Denies chest pain or diaphoresis since the initial STEMI. Was seen by outpatient cardiologist Dr. Baron and was started on Lasix however developed a diffuse rash < 1 day and therefore discontinued the medication. She presents today because she woke up in the middle of the night gasping for air with worsening abdominal discomfort.    Patient was on the floor and was diuresed. She was then transferred to the CICU for bumetanide desensitization and toleration. She tolerated the bumetanide well. While in the CICU, her blood pressures were low, therefore Entresto was stopped and she was started on Losartan. She was transferred back to telemetry floor where she remained hemodynamically stable. She was able to lay down flat without issue, continued to have apropriate saturations on Room air, and ambulated without becoming short of breath.     She had a repeat Transthoracic echocardiogram which revealed a decrease in EF to 19%. She will be medically managed as of now per HF and Cardiology.       Major Medical Changes  - Follow up with Dr. Baron for Interventional Followup in 2 weeks  - Follow up with Dr. Lester Ward for HF Followup in 1-2 weeks  - Follow up with Dr. Konrad Aviles in 1 week for general health followup  - Take the following medications  - Losartan 12.5mg qd (patient will likely refuse to take the medication)  - Aldactone 25mg qd  - Bumex 2mg PO daily  - Metoprolol Succinate 12.5 mg BID  - ASA 81mg daily  - Plavix 75mg Daily  - Protonix 40mg PO Daily 67F recently admitted to [facility] with late presentation anterior MI (Jan 2022), CAD w/ 100% pLAD, HFrEF (EF 40%) likely ICM presenting with persistent abdominal discomfort and shortness of breath at night.     After discharge from hospital for anterior wall STEMI, has had persistent abdominal discomfort and increasing dyspnea when lying flat. Dyspnea is not present when upright or with exertion but has been unable to lie flat at night. Denies peripheral edema or weight gain. Denies chest pain or diaphoresis since the initial STEMI. Was seen by outpatient cardiologist Dr. Baron and was started on Lasix however developed a diffuse pruritic rash (without blistering/mucosal involvement)< 1 day and therefore discontinued the medication and rash resolved on its own. She presented for worsening dyspnea and orthopnea.    Patient was on the floor and was diuresed initially with aldactone with insufficient response. TTE showed worsening EF from 46% to 19% as well as progression of her prior WMA. She was then transferred to the CICU for bumetanide desensitization and toleration. She tolerated the bumetanide well. While in the CICU, she had SBP in 60s and Entresto was discontinued and she was started on Losartan. She was transferred back to telemetry floor where she remained hemodynamically stable. Patient reports she felt dizziness/general discomfort while on Losartan and refused to take it. Since downgrade from the ICU, she had signficant improvement of Dyspnea and orthopnea and was able to ambulate without difficulty.    At time of discharge, patient was deemed medically stable and ready for outpatient followup.      Major Medical Changes  - Follow up with Dr. Baron for Interventional Followup in 2 weeks  - Follow up with Dr. Lester Ward for HF Followup in 1-2 weeks  - Follow up with Dr. Konrad Aviles in 1 week for general health followup  - Take the following medications  - Losartan 12.5mg qd (patient will likely refuse to take the medication)  - Aldactone 25mg qd  - Bumex 2mg PO daily  - Metoprolol Succinate 12.5 mg BID  - ASA 81mg daily  - Plavix 75mg Daily  - Protonix 40mg PO Daily

## 2023-02-13 NOTE — PROGRESS NOTE ADULT - PROBLEM SELECTOR PLAN 2
Assessment:  - Abdominal discomfort, not pain, associated with early satiety, bloating, belching, reflux  - Dyspepsia, mildly improved after holding brilinta   - Ddx: medication induced, less likely anginal component, CHF volume may be contributing although history is slightly inconsistent, some concern for stress ulcer i/s/o recent anterior wall MI     Plan:  - improved off brilinta, with improved volume status  - c/w protonix daily  - if not improved with medical optimization as above, discharge with outpatient GI follow up

## 2023-02-13 NOTE — PROGRESS NOTE ADULT - ATTENDING COMMENTS
67F w late presenting STEMI JAn 2023 (pLAD 100%, mCx 50%, OM2 40%) managed medically p/w sob. Pt reports she has had no cp on this admission but reports abd discomfort. She has been maintained of cardiac meds and diuresed. Repeat TTE shows further decr in LV systolic function and aneurysmal apex. Labs are notable for anemia. Vitals notable for borderline BP and tachycardia. On exam she is warm, mildly decr breath sounds at bases. BNP is elevated.   - HF consult for advanced therapies and optimization of GDMT. On entresto and bblocker - ? has been stopped  - ethacrynic acid for diuresis - will consider RHC   - anemia mgmt per primary team  - asa/plavix per  - d/w  re plans for revasc   - cont statin   - daily weights, strict I/Os.   - PPI   - recheck lactate - ?abd discomfort related to gut hypoperfusion

## 2023-02-13 NOTE — PROGRESS NOTE ADULT - SUBJECTIVE AND OBJECTIVE BOX
INCOMPLETE NOTE    Bulmaro Coats | PGY1| Pager: [phone]  Interval Events:    REVIEW OF SYSTEMS:  CONSTITUTIONAL: No weakness, fevers or chills  EYES/ENT: No visual changes;  No vertigo or throat pain   NECK: No pain or stiffness  RESPIRATORY: No cough, wheezing, hemoptysis; No shortness of breath  CARDIOVASCULAR: No chest pain or palpitations  GASTROINTESTINAL: No abdominal or epigastric pain. No nausea, vomiting, or hematemesis; No diarrhea or constipation. No melena or hematochezia.  GENITOURINARY: No dysuria, frequency or hematuria  NEUROLOGICAL: No numbness or weakness  SKIN: No itching, burning, rashes, or lesions   All other review of systems is negative unless indicated above.    OBJECTIVE:  ICU Vital Signs Last 24 Hrs  T(C): 36.4 (13 Feb 2023 04:25), Max: 36.8 (12 Feb 2023 20:33)  T(F): 97.6 (13 Feb 2023 04:25), Max: 98.2 (12 Feb 2023 20:33)  HR: 100 (13 Feb 2023 04:25) (100 - 120)  BP: 94/60 (13 Feb 2023 04:25) (91/57 - 106/71)  BP(mean): --  ABP: --  ABP(mean): --  RR: 18 (13 Feb 2023 04:25) (18 - 18)  SpO2: 96% (13 Feb 2023 04:25) (94% - 98%)    O2 Parameters below as of 13 Feb 2023 04:25  Patient On (Oxygen Delivery Method): room air              CAPILLARY BLOOD GLUCOSE          PHYSICAL EXAM:  General: WN/WD NAD  Neurology: A&Ox3, nonfocal, MONREAL x 4  Eyes: PERRLA/ EOMI, Gross vision intact  ENT/Neck: Neck supple, trachea midline, No JVD, Gross hearing intact  Respiratory: CTA B/L, No wheezing, rales, rhonchi  CV: RRR, +S1/S2, -S3/S4, no murmurs, rubs or gallops  Abdominal: Soft, NT, ND +BS, No HSM  MSK: 5/5 strength UE/LE bilaterally  Extremities: No edema, 2+ peripheral pulses  Skin: No Rashes, Hematoma, Ecchymosis  Incisions:   Tubes:    HOSPITAL MEDICATIONS:  MEDICATIONS  (STANDING):  aspirin  chewable 81 milliGRAM(s) Oral daily  atorvastatin 80 milliGRAM(s) Oral at bedtime  clopidogrel Tablet 75 milliGRAM(s) Oral daily  enoxaparin Injectable 40 milliGRAM(s) SubCutaneous every 24 hours  ethacrynic acid 50 milliGRAM(s) Oral daily  metoprolol succinate ER 25 milliGRAM(s) Oral daily  pantoprazole    Tablet 40 milliGRAM(s) Oral before breakfast    MEDICATIONS  (PRN):  acetaminophen     Tablet .. 650 milliGRAM(s) Oral every 6 hours PRN Temp greater or equal to 38C (100.4F), Mild Pain (1 - 3)  aluminum hydroxide/magnesium hydroxide/simethicone Suspension 30 milliLiter(s) Oral every 4 hours PRN Dyspepsia  melatonin 3 milliGRAM(s) Oral at bedtime PRN Insomnia      LABS:                        11.2   11.24 )-----------( 592      ( 12 Feb 2023 07:21 )             36.8     Hgb Trend: 11.2<--, 11.5<--  02-12    141  |  103  |  18  ----------------------------<  121<H>  3.9   |  25  |  1.10    Ca    8.8      12 Feb 2023 20:33  Phos  3.8     02-12  Mg     2.3     02-12    TPro  7.4  /  Alb  3.0<L>  /  TBili  0.4  /  DBili  x   /  AST  15  /  ALT  12  /  AlkPhos  131<H>  02-12    Creatinine Trend: 1.10<--, 0.82<--, 0.77<--, 0.73<--, 0.71<--, 0.72<--        Venous Blood Gas:  02-11 @ 12:30  7.35/47/19/26/19.0  VBG Lactate: 2.3      MICROBIOLOGY:          Bulmaro Pauly | PGY1| Pager: [phone]  Interval Events: NAEON; patient reports she feels moderately improved from yesterday. Endorses mild abdominal discomfort and reports continued good urine output.   Yesterday, patient had QTC >650; was given 2g Mg, with TTE ordered;     REVIEW OF SYSTEMS:  GASTROINTESTINAL: mild abdominal discomfort  All other review of systems is negative unless indicated above.    OBJECTIVE:  ICU Vital Signs Last 24 Hrs  T(C): 36.4 (13 Feb 2023 04:25), Max: 36.8 (12 Feb 2023 20:33)  T(F): 97.6 (13 Feb 2023 04:25), Max: 98.2 (12 Feb 2023 20:33)  HR: 100 (13 Feb 2023 04:25) (100 - 120)  BP: 94/60 (13 Feb 2023 04:25) (91/57 - 106/71)  BP(mean): --  ABP: --  ABP(mean): --  RR: 18 (13 Feb 2023 04:25) (18 - 18)  SpO2: 96% (13 Feb 2023 04:25) (94% - 98%)    O2 Parameters below as of 13 Feb 2023 04:25  Patient On (Oxygen Delivery Method): room air              CAPILLARY BLOOD GLUCOSE          PHYSICAL EXAM:  General: NAD  Neurology: A&Ox3, nonfocal, MONREAL x 4  Respiratory: CTA B/L, No wheezing, rales, rhonchi  CV: tachycardic with regular rhythm, +S1/S2, -S3/S4, no murmurs, rubs or gallops  Abdominal: Soft, NT, ND +BS, No HSM; no tenderness/pain appreciated  Extremities: Trace Edema on RLE, no edema noted on E      HOSPITAL MEDICATIONS:  MEDICATIONS  (STANDING):  aspirin  chewable 81 milliGRAM(s) Oral daily  atorvastatin 80 milliGRAM(s) Oral at bedtime  clopidogrel Tablet 75 milliGRAM(s) Oral daily  enoxaparin Injectable 40 milliGRAM(s) SubCutaneous every 24 hours  ethacrynic acid 50 milliGRAM(s) Oral daily  metoprolol succinate ER 25 milliGRAM(s) Oral daily  pantoprazole    Tablet 40 milliGRAM(s) Oral before breakfast    MEDICATIONS  (PRN):  acetaminophen     Tablet .. 650 milliGRAM(s) Oral every 6 hours PRN Temp greater or equal to 38C (100.4F), Mild Pain (1 - 3)  aluminum hydroxide/magnesium hydroxide/simethicone Suspension 30 milliLiter(s) Oral every 4 hours PRN Dyspepsia  melatonin 3 milliGRAM(s) Oral at bedtime PRN Insomnia      LABS:                        11.2   11.24 )-----------( 592      ( 12 Feb 2023 07:21 )             36.8     Hgb Trend: 11.2<--, 11.5<--  02-12    141  |  103  |  18  ----------------------------<  121<H>  3.9   |  25  |  1.10    Ca    8.8      12 Feb 2023 20:33  Phos  3.8     02-12  Mg     2.3     02-12    TPro  7.4  /  Alb  3.0<L>  /  TBili  0.4  /  DBili  x   /  AST  15  /  ALT  12  /  AlkPhos  131<H>  02-12    Creatinine Trend: 1.10<--, 0.82<--, 0.77<--, 0.73<--, 0.71<--, 0.72<--        Venous Blood Gas:  02-11 @ 12:30  7.35/47/19/26/19.0  VBG Lactate: 2.3      MICROBIOLOGY:

## 2023-02-13 NOTE — DISCHARGE NOTE PROVIDER - NSDCCPCAREPLAN_GEN_ALL_CORE_FT
PRINCIPAL DISCHARGE DIAGNOSIS  Diagnosis: CHF exacerbation  Assessment and Plan of Treatment:        PRINCIPAL DISCHARGE DIAGNOSIS  Diagnosis: CHF exacerbation  Assessment and Plan of Treatment: You came to the hospital and were found to be fluid overloaded with heart failure exacerbation. You were given a new medication called Bumetanide. Continue to take your medications as prescribed. Follow up with your Cardiologist and outpatient doctor. Return to the hospital if you experience worsening shortness of breath, chest pain, swelling in your legs.       PRINCIPAL DISCHARGE DIAGNOSIS  Diagnosis: CHF exacerbation  Assessment and Plan of Treatment: You came to the hospital and were found to be fluid overloaded with heart failure exacerbation. Your heart was found to have worsening function on echo. There was concern for an allergic reaction to lasix due to the rash that developed so you were taken to the CICU for a bumex desnsitization which you tolerated. Please continue to take Bumex and Aldactone as they will help prevent your body from retaining excess fluid and help your heart to pump efficiently. Please continue to take your Aspirin and Plavix to prevent progression of your known coronary artery disease.   Please contact your cardiologist if you have worsening swelling in your legs or dyspnea  Please return to the ED if you begin to experience severe chest pain or have any fainting event.   Please follow up with Dr. Baron 1-2 weeks after you are discharged  Please follow up with Dr. Ward for management of your heart failure

## 2023-02-13 NOTE — DISCHARGE NOTE PROVIDER - CARE PROVIDERS DIRECT ADDRESSES
[email or web address]

## 2023-02-13 NOTE — DIETITIAN INITIAL EVALUATION ADULT - ADD RECOMMEND
1) Continue DASH diet as tolerated. Defer texture/consistency to SLP/team.   2) Continue to monitor PO intake, weight, labs, skin, GI status, and diet.  3) Honor food preferences as able. Provide encouragement with meals PRN to promote adequate PO intake.   4) RD remains available for diet education reinforcement PRN.

## 2023-02-13 NOTE — DISCHARGE NOTE PROVIDER - NSDCMRMEDTOKEN_GEN_ALL_CORE_FT
aspirin 81 mg oral tablet, chewable: 1 tab(s) orally once a day  atorvastatin 80 mg oral tablet: 1 tab(s) orally once a day (at bedtime)  Edecrin 25 mg oral tablet: 2 tab(s) orally once a day   metoprolol succinate 25 mg oral capsule, extended release: 0.5 cap(s) orally once a day   sacubitril-valsartan 24 mg-26 mg oral tablet: 1 tab(s) orally 2 times a day  ticagrelor 90 mg oral tablet: 1 tab(s) orally every 12 hours   aspirin 81 mg oral tablet, chewable: 1 tab(s) orally once a day  atorvastatin 80 mg oral tablet: 1 tab(s) orally once a day (at bedtime)  bumetanide 2 mg oral tablet: 1 tab(s) orally once a day  clopidogrel 75 mg oral tablet: 1 tab(s) orally once a day  losartan 25 mg oral tablet: 0.5 tab(s) orally once a day   spironolactone 25 mg oral tablet: 1 tab(s) orally once a day   aspirin 81 mg oral tablet, chewable: 1 tab(s) orally once a day  atorvastatin 80 mg oral tablet: 1 tab(s) orally once a day (at bedtime)  bumetanide 2 mg oral tablet: 1 tab(s) orally once a day  clopidogrel 75 mg oral tablet: 1 tab(s) orally once a day  losartan 25 mg oral tablet: 0.5 tab(s) orally once a day   Metoprolol Succinate ER 25 mg oral tablet, extended release: 0.5 tab(s) orally once a day   spironolactone 25 mg oral tablet: 1 tab(s) orally once a day   aluminum hydroxide/magnesium hydroxide/simethicone 200 mg-200 mg-25 mg oral tablet, chewable: 2 tab(s) chewed every 6 hours, As Needed -for heartburn - for indigestion   aspirin 81 mg oral tablet, chewable: 1 tab(s) orally once a day  atorvastatin 80 mg oral tablet: 1 tab(s) orally once a day (at bedtime)  bumetanide 2 mg oral tablet: 1 tab(s) orally once a day  clopidogrel 75 mg oral tablet: 1 tab(s) orally once a day  losartan 25 mg oral tablet: 0.5 tab(s) orally once a day   Metoprolol Succinate ER 25 mg oral tablet, extended release: 0.5 tab(s) orally once a day   spironolactone 25 mg oral tablet: 1 tab(s) orally once a day

## 2023-02-13 NOTE — DIETITIAN INITIAL EVALUATION ADULT - ORAL NUTRITION SUPPLEMENTS
Pt not amenable to oral nutrition supplementation at this time. Encouraged adequate consumption of meals to optimize protein-energy intake as able; encouraged small/frequent meals, nutrient dense snacks, prioritizing protein foods at meal time.

## 2023-02-13 NOTE — DIETITIAN INITIAL EVALUATION ADULT - ORAL INTAKE PTA/DIET HISTORY
Pt reports altered appetite and PO intake x 2 weeks PTA secondary to abdominal discomfort; reports her appetite "comes and goes" depending on the day, but overall she tries to consume adequate PO intake daily. Pt reports her doctor recently advised her to limit salt and fluid intake in setting of fluid retention and prescribed diuretics; pt and pt's daughter endorse limiting salt when cooking, reading food labels when shopping, and being more conscious of salt intake daily. Pt confirms NKFA.

## 2023-02-13 NOTE — DIETITIAN INITIAL EVALUATION ADULT - REASON FOR ADMISSION
Heart failure    Per chart: 67F recently admitted to [facility] with late presentation anterior MI (Jan 2022), CAD w/ 100% pLAD, HFrEF (EF 40%) likely ICM presenting with persistent abdominal discomfort and shortness of breath at night.

## 2023-02-14 DIAGNOSIS — T50.905A ADVERSE EFFECT OF UNSPECIFIED DRUGS, MEDICAMENTS AND BIOLOGICAL SUBSTANCES, INITIAL ENCOUNTER: ICD-10-CM

## 2023-02-14 DIAGNOSIS — Z88.2 ALLERGY STATUS TO SULFONAMIDES: ICD-10-CM

## 2023-02-14 LAB
ANION GAP SERPL CALC-SCNC: 11 MMOL/L — SIGNIFICANT CHANGE UP (ref 5–17)
BUN SERPL-MCNC: 17 MG/DL — SIGNIFICANT CHANGE UP (ref 7–23)
CALCIUM SERPL-MCNC: 9.2 MG/DL — SIGNIFICANT CHANGE UP (ref 8.4–10.5)
CHLORIDE SERPL-SCNC: 99 MMOL/L — SIGNIFICANT CHANGE UP (ref 96–108)
CO2 SERPL-SCNC: 28 MMOL/L — SIGNIFICANT CHANGE UP (ref 22–31)
CREAT SERPL-MCNC: 0.8 MG/DL — SIGNIFICANT CHANGE UP (ref 0.5–1.3)
EGFR: 81 ML/MIN/1.73M2 — SIGNIFICANT CHANGE UP
GLUCOSE SERPL-MCNC: 117 MG/DL — HIGH (ref 70–99)
HCT VFR BLD CALC: 33.3 % — LOW (ref 34.5–45)
HGB BLD-MCNC: 10.4 G/DL — LOW (ref 11.5–15.5)
LACTATE SERPL-SCNC: 1.1 MMOL/L — SIGNIFICANT CHANGE UP (ref 0.5–2)
MAGNESIUM SERPL-MCNC: 2.2 MG/DL — SIGNIFICANT CHANGE UP (ref 1.6–2.6)
MCHC RBC-ENTMCNC: 28 PG — SIGNIFICANT CHANGE UP (ref 27–34)
MCHC RBC-ENTMCNC: 31.2 GM/DL — LOW (ref 32–36)
MCV RBC AUTO: 89.8 FL — SIGNIFICANT CHANGE UP (ref 80–100)
NRBC # BLD: 0 /100 WBCS — SIGNIFICANT CHANGE UP (ref 0–0)
PHOSPHATE SERPL-MCNC: 3.9 MG/DL — SIGNIFICANT CHANGE UP (ref 2.5–4.5)
PLATELET # BLD AUTO: 507 K/UL — HIGH (ref 150–400)
POTASSIUM SERPL-MCNC: 3.2 MMOL/L — LOW (ref 3.5–5.3)
POTASSIUM SERPL-SCNC: 3.2 MMOL/L — LOW (ref 3.5–5.3)
RBC # BLD: 3.71 M/UL — LOW (ref 3.8–5.2)
RBC # FLD: 14.7 % — HIGH (ref 10.3–14.5)
SODIUM SERPL-SCNC: 138 MMOL/L — SIGNIFICANT CHANGE UP (ref 135–145)
WBC # BLD: 8.02 K/UL — SIGNIFICANT CHANGE UP (ref 3.8–10.5)
WBC # FLD AUTO: 8.02 K/UL — SIGNIFICANT CHANGE UP (ref 3.8–10.5)

## 2023-02-14 PROCEDURE — 99233 SBSQ HOSP IP/OBS HIGH 50: CPT

## 2023-02-14 PROCEDURE — 99222 1ST HOSP IP/OBS MODERATE 55: CPT

## 2023-02-14 PROCEDURE — 99233 SBSQ HOSP IP/OBS HIGH 50: CPT | Mod: GC

## 2023-02-14 RX ORDER — ETHACRYNIC ACID 25 MG/1
50 TABLET ORAL DAILY
Refills: 0 | Status: DISCONTINUED | OUTPATIENT
Start: 2023-02-14 | End: 2023-02-16

## 2023-02-14 RX ORDER — SACUBITRIL AND VALSARTAN 24; 26 MG/1; MG/1
1 TABLET, FILM COATED ORAL
Refills: 0 | Status: DISCONTINUED | OUTPATIENT
Start: 2023-02-14 | End: 2023-02-14

## 2023-02-14 RX ORDER — POTASSIUM CHLORIDE 20 MEQ
40 PACKET (EA) ORAL ONCE
Refills: 0 | Status: COMPLETED | OUTPATIENT
Start: 2023-02-14 | End: 2023-02-14

## 2023-02-14 RX ORDER — SPIRONOLACTONE 25 MG/1
25 TABLET, FILM COATED ORAL DAILY
Refills: 0 | Status: DISCONTINUED | OUTPATIENT
Start: 2023-02-14 | End: 2023-02-14

## 2023-02-14 RX ORDER — ATORVASTATIN CALCIUM 80 MG/1
80 TABLET, FILM COATED ORAL DAILY
Refills: 0 | Status: DISCONTINUED | OUTPATIENT
Start: 2023-02-14 | End: 2023-02-14

## 2023-02-14 RX ORDER — POTASSIUM CHLORIDE 20 MEQ
20 PACKET (EA) ORAL
Refills: 0 | Status: COMPLETED | OUTPATIENT
Start: 2023-02-14 | End: 2023-02-14

## 2023-02-14 RX ORDER — SACUBITRIL AND VALSARTAN 24; 26 MG/1; MG/1
1 TABLET, FILM COATED ORAL
Refills: 0 | Status: DISCONTINUED | OUTPATIENT
Start: 2023-02-14 | End: 2023-02-16

## 2023-02-14 RX ORDER — SPIRONOLACTONE 25 MG/1
25 TABLET, FILM COATED ORAL DAILY
Refills: 0 | Status: DISCONTINUED | OUTPATIENT
Start: 2023-02-14 | End: 2023-02-21

## 2023-02-14 RX ORDER — ATORVASTATIN CALCIUM 80 MG/1
40 TABLET, FILM COATED ORAL DAILY
Refills: 0 | Status: DISCONTINUED | OUTPATIENT
Start: 2023-02-14 | End: 2023-02-21

## 2023-02-14 RX ADMIN — CHLORHEXIDINE GLUCONATE 1 APPLICATION(S): 213 SOLUTION TOPICAL at 06:47

## 2023-02-14 RX ADMIN — CLOPIDOGREL BISULFATE 75 MILLIGRAM(S): 75 TABLET, FILM COATED ORAL at 08:30

## 2023-02-14 RX ADMIN — SPIRONOLACTONE 25 MILLIGRAM(S): 25 TABLET, FILM COATED ORAL at 15:28

## 2023-02-14 RX ADMIN — ATORVASTATIN CALCIUM 40 MILLIGRAM(S): 80 TABLET, FILM COATED ORAL at 15:19

## 2023-02-14 RX ADMIN — Medication 30 MILLILITER(S): at 03:25

## 2023-02-14 RX ADMIN — Medication 40 MILLIEQUIVALENT(S): at 08:31

## 2023-02-14 RX ADMIN — SACUBITRIL AND VALSARTAN 1 TABLET(S): 24; 26 TABLET, FILM COATED ORAL at 13:14

## 2023-02-14 RX ADMIN — Medication 81 MILLIGRAM(S): at 11:37

## 2023-02-14 RX ADMIN — Medication 25 MILLIGRAM(S): at 10:15

## 2023-02-14 RX ADMIN — ETHACRYNIC ACID 50 MILLIGRAM(S): 25 TABLET ORAL at 11:39

## 2023-02-14 RX ADMIN — Medication 30 MILLILITER(S): at 07:50

## 2023-02-14 NOTE — CONSULT NOTE ADULT - ATTENDING COMMENTS
66 y/o female with recent late presenting STEMI w/o prox LAD (pLAD 100%, mCx 50%, OM2 40%), HFrEF 35% now 19%.   Pt started on furosemide and developed a puristic rash approx 5-6 hrs after taking Furosamide, and lasting 2-3 days without any treatment.  At that time furosemide was d/c'ed due to possible allergy.  No mucosal involvement. No skin sluffing. No angioedema.  Presently pt presented with volume overload and in light of low EF (19%), Cardiology is recommending Lasix as per verbal sign out.    If Lasix is recommended by cardiology, then please document in chart, and then we can proceed with a cautious challenge in the ICU as discussed above.  Discussed option of challenge with the pt, and she wanted more information and to discuss with her cardiology team.  Hold beta blocker if acceptable to cardiology due to its impact on treatment with Epinephrine in the event of an acute IgE mediated reaction.  I reached out to Dr. Baron and updated him.     See plan as outlined above.  Contact us with any questions.    Colt Christie DO, PhD  Division of Allergy/Immunology  [phone]
66 yo woman with recently diagnosed ICMP and stage C HFrEF. LHC found occluded LAD (seemed to be missed MI).   Was discharged home on GDMT but no diuretics, developed worsening fluid retention and was started on Lasix by outpatient cardiologist. Improved but then developed skin rash and stopped Lasix which resolved rash.   Had then worsening orthopnea, PND and LE edema. Was admitted to [facility] and treated with Ethacrinic acid with improvement.     HF consulted due to difficulty introducing GDMT limited by hypotension with dizziness.   LVEF on discharge was 40% now it is <20% with remodeled LV and decreased RV function     Exam shows warm extremities with 1-2+ edema, narrow pulse pressure and tachycardia   Labs show normal sodium, Cr and LFTs    - Hold BB   - Start Entresto 24-26 mg bid   - Start Spironolactone 25 mg daily   - Continue Ethacrinic acid. Consult allergy to re-challenge with loop diuretics given that they will be a cornerstone of her therapy at home and reaction was not serious.   - We will follow with you     Dustin Cabrera MD
Agree with plan as outlined above.  dCHF  - Ethacrynic Acid - continiue and assess for insurance coverage as outpatient    B Ember                                                                                            Eighty minutes spent in direct patient care and communication

## 2023-02-14 NOTE — PROGRESS NOTE ADULT - PROBLEM SELECTOR PLAN 4
- unclear etiology, patient missed Toprol dose today  - low suspicion for PE  - patient currently with significant abdominal discomfort, and anxiety  - continue to monitor - late presentation anterior wall MI (1/21/23)  - [facility]  1/22/23: pLAD 100%, mCx 50%, OM2 40%  - trops, LFTs downtrending since event 2 weeks prior  - denies anginal symptoms since event

## 2023-02-14 NOTE — PROGRESS NOTE ADULT - ATTENDING COMMENTS
Patient seen and examined at bedside. No acute events overnight. No events on telemetry. Still feels bloated. TTE showing reduced EF and elevated filling pressures. HF consulted and optimizing on GDMT. Allergy consulted given Lasix reaction. Continue with standing daily weight for now. Will need to continue to touch base with HF. Possibly we will do a graded challenge if cardiology deems necessary.

## 2023-02-14 NOTE — CONSULT NOTE ADULT - SUBJECTIVE AND OBJECTIVE BOX
Vidhya Jean-Baptiste MD  Cardiology Fellow  [phone]  All Cardiology service information can be found 24/7 on [email or web address], password: [email or web address]    Patient seen and evaluated at bedside    Chief Complaint:    HPI:  67F recently admitted to [facility] with late presentation anterior MI (Jan 2022), CAD w/ 100% pLAD, HFrEF (EF 40%) likely ICM presenting with persistent abdominal discomfort and shortness of breath at night.     After discharge from hospital for anterior wall STEMI, has had persistent abdominal discomfort and increasing dyspnea when lying flat. Dyspnea is not present when upright or with exertion but has been unable to lie flat at night. Denies peripheral edema or weight gain. Denies chest pain or diaphoresis since the initial STEMI. Was seen by outpatient cardiologist Dr. Baron and was started on Lasix however developed a diffuse rash < 1 day and therefore discontinued the medication. She presents today because she woke up in the middle of the night gasping for air with worsening abdominal discomfort.    Abdominal discomfort, she describes not as pain, but with belching, early satiety, some acidic taste in the morning. Protonix has not provided her relief although holding the brilinta one night did help. Was asked to switch brilinta to plavix for this reason by Dr. Baron.     In ED, vitals were normal, EKG was unchanged from prior, Labs were overall unremarkable, CXR with small R PLEF. Cardiology was consulted, and patient was started on metolazone for diuresis.  (11 Feb 2023 16:23)      PMHx:   No pertinent past medical history    History of acute anterior wall MI        PSHx:   No significant past surgical history        Allergies:  Lasix (Rash (Mod to Severe))      Home Meds:    Current Medications:   acetaminophen     Tablet .. 650 milliGRAM(s) Oral every 6 hours PRN  aluminum hydroxide/magnesium hydroxide/simethicone Suspension 30 milliLiter(s) Oral every 4 hours PRN  aspirin  chewable 81 milliGRAM(s) Oral daily  atorvastatin 80 milliGRAM(s) Oral daily  chlorhexidine 2% Cloths 1 Application(s) Topical <User Schedule>  clopidogrel Tablet 75 milliGRAM(s) Oral daily  ethacrynic acid 50 milliGRAM(s) Oral daily  melatonin 3 milliGRAM(s) Oral at bedtime PRN  pantoprazole    Tablet 40 milliGRAM(s) Oral before breakfast  potassium chloride    Tablet ER 20 milliEquivalent(s) Oral every 2 hours  sacubitril 24 mG/valsartan 26 mG 1 Tablet(s) Oral two times a day  spironolactone 25 milliGRAM(s) Oral daily      FAMILY HISTORY:  No pertinent family history in first degree relatives        Physical Exam:  T(F): 98.1 (02-14), Max: 98.1 (02-14)  HR: 109 (02-14) (99 - 109)  BP: 98/64 (02-14) (92/62 - 109/78)  RR: 18 (02-14)  SpO2: 97% (02-14)  GENERAL: No acute distress, well-developed  HEAD:  Atraumatic, Normocephalic  ENT: elevated JVP   CHEST/LUNG: decreased breath sounds at bases   BACK: No spinal tenderness  HEART: Regular rate and rhythm; No murmurs, rubs, or gallops  ABDOMEN: Soft, Nontender, Nondistended; Bowel sounds present  EXTREMITIES:  trace edema   PSYCH: Nl behavior, nl affect  NEUROLOGY: AAOx3, non-focal, cranial nerves intact  SKIN: Normal color, No rashes or lesions  LINES:          Labs: Personally reviewed                        10.4   8.02  )-----------( 507      ( 14 Feb 2023 06:46 )             33.3     02-14    138  |  99  |  17  ----------------------------<  117<H>  3.2<L>   |  28  |  0.80    Ca    9.2      14 Feb 2023 06:46  Phos  3.9     02-14  Mg     2.2     02-14    TPro  7.4  /  Alb  3.0<L>  /  TBili  0.4  /  DBili  x   /  AST  15  /  ALT  12  /  AlkPhos  131<H>  02-12        CARDIAC MARKERS ( 12 Feb 2023 20:33 )  293 ng/L / x     / x     / x     / x     / x      CARDIAC MARKERS ( 11 Feb 2023 12:23 )  303 ng/L / x     / x     / x     / x     / x            Serum Pro-Brain Natriuretic Peptide: 16705 pg/mL (02-11 @ 12:23)

## 2023-02-14 NOTE — PROGRESS NOTE ADULT - SUBJECTIVE AND OBJECTIVE BOX
Cardiology Progress Note  ------------------------------------------------------------------------------------------  SUBJECTIVE:   No events overnight. Patient reports having poor sleep due to persistent abdominal pain. Continues to urinate well.   -------------------------------------------------------------------------------------------  ROS:  CV: chest pain (-), palpitation (-), orthopnea (-), PND (-), edema (-)  PULM: SOB (-), cough (-), wheezing (-), hemoptysis (-).   CONST: fever (-), chills (-) or fatigue (-)  GI: abdominal distension (-), abdominal pain (-) , nausea/vomiting (-), hematemesis, (-), melena (-), hematochezia (-)  : dysuria (-), frequency (-), hematuria (-).   NEURO: numbness (-), weakness (-), dizziness (-)  SKIN: itching (-), rash (-)  HEENT:  visual changes (-); vertigo or throat pain (-);  neck stiffness (-)     All other review of systems is negative unless indicated above.   -------------------------------------------------------------------------------------------  VS:  T(F): 98.1 (02-14), Max: 98.1 (02-14)  HR: 94 (02-14) (94 - 109)  BP: 92/58 (02-14) (92/58 - 109/78)  RR: 18 (02-14)  SpO2: 97% (02-14)  I&O's Summary    13 Feb 2023 07:01  -  14 Feb 2023 07:00  --------------------------------------------------------  IN: 630 mL / OUT: 400 mL / NET: 230 mL    14 Feb 2023 07:01  -  14 Feb 2023 16:34  --------------------------------------------------------  IN: 662 mL / OUT: 0 mL / NET: 662 mL      ------------------------------------------------------------------------------------------  PHYSICAL EXAM:  GENERAL: NAD  HEAD:  Atraumatic, Normocephalic.  EYES: EOMI, PERRLA, conjunctiva and sclera clear.  ENT: Moist mucous membranes.  NECK: Supple, No JVD.  CHEST/LUNG: Clear to auscultation bilaterally; No rales, rhonchi, wheezing, or rubs. Unlabored respirations.  HEART: Regular rate and rhythm; No murmurs, rubs, or gallops.  ABDOMEN: Bowel sounds present; Soft, Nontender, Nondistended.   EXTREMITIES:  2+ Peripheral Pulses, brisk capillary refill. No clubbing, cyanosis, or edema.  PSYCH: Normal affect.  SKIN: No rashes or lesions.  -------------------------------------------------------------------------------------------  LABS:                          10.4   8.02  )-----------( 507      ( 14 Feb 2023 06:46 )             33.3     02-14    138  |  99  |  17  ----------------------------<  117<H>  3.2<L>   |  28  |  0.80    Ca    9.2      14 Feb 2023 06:46  Phos  3.9     02-14  Mg     2.2     02-14    TPro  7.4  /  Alb  3.0<L>  /  TBili  0.4  /  DBili  x   /  AST  15  /  ALT  12  /  AlkPhos  131<H>  02-12      CARDIAC MARKERS ( 12 Feb 2023 20:33 )  293 ng/L / x     / x     / x     / x     / x      CARDIAC MARKERS ( 11 Feb 2023 12:23 )  303 ng/L / x     / x     / x     / x     / x                -------------------------------------------------------------------------------------------  Meds:  acetaminophen     Tablet .. 650 milliGRAM(s) Oral every 6 hours PRN  aluminum hydroxide/magnesium hydroxide/simethicone Suspension 30 milliLiter(s) Oral every 4 hours PRN  aspirin  chewable 81 milliGRAM(s) Oral daily  atorvastatin 40 milliGRAM(s) Oral daily  chlorhexidine 2% Cloths 1 Application(s) Topical <User Schedule>  clopidogrel Tablet 75 milliGRAM(s) Oral daily  ethacrynic acid 50 milliGRAM(s) Oral daily  melatonin 3 milliGRAM(s) Oral at bedtime PRN  pantoprazole    Tablet 40 milliGRAM(s) Oral before breakfast  sacubitril 24 mG/valsartan 26 mG 1 Tablet(s) Oral two times a day  spironolactone 25 milliGRAM(s) Oral daily    -------------------------------------------------------------------------------------------

## 2023-02-14 NOTE — PROGRESS NOTE ADULT - ATTENDING COMMENTS
Please see modifications/additions to fellow's note  67F w late presenting STEMI JAn 2023 (pLAD 100%, mCx 50%, OM2 40%) managed medically p/w sob. Pt reports she has had no cp on this admission but reports abd discomfort. She has been maintained of cardiac meds and diuresed. Repeat TTE shows further decr in LV systolic function and aneurysmal apex. Vitals notable for borderline BP and tachycardia. On exam she is warm, mildly decr breath sounds at bases. Labs noted. Feels overall ok - no cp/sob, still w occ abd pain - lactate was wnl thus less concern fo hypoperfusion.   - HF consult for advanced therapies and optimization of GDMT.   - ethacrynic acid 50mg po qday for diuresis   - anemia mgmt per primary team  - asa/plavix per  - d/w  re plans for revasc as outpt once pt ready for d/c   - cont statin   - daily weights, strict I/Os.   - PPI

## 2023-02-14 NOTE — PROGRESS NOTE ADULT - ASSESSMENT
67F recently admitted to [facility] with late presentation anterior MI (Jan 2022), CAD w/ 100% pLAD, HFrEF (EF 40%) likely ICM presenting with persistent abdominal discomfort and shortness of breath at night, is on ethacrynic acid with improvement in dyspnea, but continued orthopnea.  67F recently admitted to [facility] with late presentation anterior MI (Jan 2022), CAD w/ 100% pLAD, HFrEF (EF 40%) likely ICM presenting with persistent abdominal discomfort and orthopnea is found to be in acute on chronic systolic heart failure with new EF 19% on TTE, likely in the setting of ischemic cardiomyopathy, on ethacrynic acid, spironolactone given recent possibly sulfa reaction and entresto, has mild improvement in orthopnea; pending further recs from Heart Failure. Allergy consulted today for evaluation of Lasix reaction.

## 2023-02-14 NOTE — PROGRESS NOTE ADULT - ASSESSMENT
67F with history of late presenting STEMI with total occlusion of prox LAD (not revascularized), HFrEF (EF 36%) who comes in for shortness of breath. Exam significant for fluid overload. She was recently seen by Dr. Baron and was started on lasix. She was improving with lasix bu could not tolerate due to allergy. Currently x-ray significant for pulmonary edema. BNP elevated at 10,000 with baseline 2000.     #HFrEF:    - Continue Entresto 24/26 bid if BP tolerates. hold for SBP <85  - Hold toprol 12.5 xl for now  - Start aldactone 25mg daily  - Continue ethacrynic acid 50mg daily  - Strict I/Os and daily weights    #Prior STEMI: cath and viability study reviewed.   patient is currently chest pain free.   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 Discussed with Dr. Baron- plan for outpatient follow up consideration for revascularization and repeat viability study  - c/w ASA  - Clopidogrel 75mg daily  - Lipitor 80mg daily  - Per Dr. Baron's last outpatient note, switch ticagrelor to plavix 75mg daily    Recommendations are preliminary until attending attestation.    Clayton Gross MD  Cardiology Fellow- PGY 4

## 2023-02-14 NOTE — CONSULT NOTE ADULT - PROBLEM SELECTOR RECOMMENDATION 9
-Daily weights, I/Os, Mg >2, K > 4  -C/w ethacrynic acid 50 mg QD  -Entresto 24/26  -Start spironolactone 25 qd  -stop BB  -Would consider starting farxiga

## 2023-02-14 NOTE — CONSULT NOTE ADULT - ASSESSMENT
67F with recent late presenting STEMI without prox LAD (pLAD 100%, mCx 50%, OM2 40%), HFrEF 35% now 19%, presenting with dyspnea, abdominal pain, with recent outpatient visit with Dr. Baron found to be volume overloaded. Allergy/Immunology consulted for allergy to lasix and need to resume lasix.     Adverse reaction to drug (Lasix)  Her reaction (hives) was limited to the skin and was delayed, occurring 6 hours after the dose. She is apprehensive about resuming Lasix and is unwilling to do so without full support from her outpatient cardiologist Dr. Bradley Baron. Additionally, cardiology should explicitly state whether or not the benefits of Lasix (as oppose to an alternative diuretic) outweigh the risks of avoidance.     If pt is agreeable and if Lasix is determined to be the only suitable agent then we would recommend a graded challenge over 3 days.   Would perform challenge as follows: administer 10% of the desired dose of Lasix. Monitor for 24 hours and if no reaction then can give 25% the desired dose. Monitor for 24 hours, if no reaction can give full desired dose of Lasix.   If she passes challenge then Lasix can be safely administered and should be removed from her allergy list.   Notably, the protocol does not include premedication. If she develops hives, an antihistamine such as cetirizine or loratadine can be given, and we may need to premedicate her with antihistamine for subsequent doses. In this event, then for subsequent Lasix doses she should take the medication with accompanying anthistamine.     Special Instructions:   Procedure must be done in a monitored setting; strongly advise that this should be done during the day with full staffing available.   0.3 cc Epinephrine (1:1000) IM, Benadryl 50 mg IV/PO and Albuterol INH must be kept at bedside at all times during procedure.   Record vital signs and exam prior to each dose to be given every 20 minutes   In case of mild allergic reaction (hives, mild swelling or itching), immediately give 50 mg Benadryl IM/IV and contact Allergy/Immunology.   In case of anaphylaxis, patient should be treated with 0.3 cc 1:1000 Epinephrine IM immediately.   Refractory cases and/or those on beta blockers may require glucagon (1-2mg IV over 5 minutes); followed by 5-15mcg/min infusion if needed.   (Symptoms of anaphylaxis include respiratory distress, hypotension, and/or involvement of 2 organ systems)     On discharge, please have the patient schedule follow up at our office:  [address] [place] [address], [address]  [place] [address]  Tel: [phone]  Fax: [phone]    Please feel free to message on MS Teams with any questions or concerns. If after 5PM please page fellow on call.     Nikki Kimura, MD   Fellow, Division of Allergy and Immunology  [facility] [employer] at [facility]   67F with recent late presenting STEMI without prox LAD (pLAD 100%, mCx 50%, OM2 40%), HFrEF 35% now 19%, presenting with dyspnea, abdominal pain, with recent outpatient visit with Dr. Baron found to be volume overloaded. Allergy/Immunology consulted for allergy to lasix and need to resume lasix.     Adverse reaction to drug (Lasix)  Her reaction (hives) was limited to the skin and was delayed, occurring 6 hours after the dose. She is apprehensive about resuming Lasix and is unwilling to do so without full support from her outpatient cardiologist Dr. Bradley Baron. Additionally, cardiology should explicitly state whether or not the benefits of Lasix (as oppose to an alternative diuretic) outweigh the risks of avoidance.     If pt is agreeable and if Lasix is determined to be the only suitable agent then we would recommend a graded challenge over 3 days. Of note, she should be off beta blocker if challenge is pursued as beta blocker may make epinephrine less effective should she require epi for a reaction. Given her comorbidities she should have close observation during challenge.   Would perform challenge as follows: administer 10% of the desired dose of Lasix. Monitor for 24 hours and if no reaction then can give 25% the desired dose. Monitor for 24 hours, if no reaction can give full desired dose of Lasix.   If she passes challenge then Lasix can be safely administered and should be removed from her allergy list.   Notably, the protocol does not include premedication. If she develops hives, an antihistamine such as cetirizine or loratadine can be given, and we may need to premedicate her with antihistamine for subsequent doses. In this event, then for subsequent Lasix doses she should take the medication with accompanying anthistamine.     Special Instructions:   Procedure must be done in a monitored setting; strongly advise that this should be done during the day with full staffing available.   0.3 cc Epinephrine (1:1000) IM, Benadryl 50 mg IV/PO and Albuterol INH must be kept at bedside at all times during procedure.   Record vital signs and exam prior to each dose to be given every 20 minutes   In case of mild allergic reaction (hives, mild swelling or itching), immediately give 50 mg Benadryl IM/IV and contact Allergy/Immunology.   In case of anaphylaxis, patient should be treated with 0.3 cc 1:1000 Epinephrine IM immediately.   Refractory cases and/or those on beta blockers may require glucagon (1-2mg IV over 5 minutes); followed by 5-15mcg/min infusion if needed.   (Symptoms of anaphylaxis include respiratory distress, hypotension, and/or involvement of 2 organ systems)     On discharge, please have the patient schedule follow up at our office:  [address] [place] [address], [address]  [place] [address]  Tel: [phone]  Fax: [phone]    Please feel free to message on MS Teams with any questions or concerns. If after 5PM please page fellow on call.     Nikki Kimura, MD   Fellow, Division of Allergy and Immunology  [facility] [employer] [facility] at [facility]   67F with recent late presenting STEMI without prox LAD (pLAD 100%, mCx 50%, OM2 40%), HFrEF 35% now 19%, presenting with dyspnea, abdominal pain, with recent outpatient visit with Dr. Baron found to be volume overloaded. Allergy/Immunology consulted for allergy to lasix and need to resume lasix.     Adverse reaction to drug (Lasix)  Her reaction (hives) was limited to the skin and was delayed, occurring 6 hours after the dose. She is apprehensive about resuming Lasix and is unwilling to do so without full support from her outpatient cardiologist Dr. Bradley Baron. Additionally, cardiology should explicitly state whether or not the benefits of Lasix (as oppose to an alternative diuretic) outweigh the risks of avoidance.     If pt is agreeable and if Lasix is determined to be the only suitable agent then we would recommend a graded challenge over 3 days. Of note, she should be off beta blocker if challenge is pursued as beta blocker may make epinephrine less effective should she require epi for a reaction. Given her comorbidities she should have close observation during challenge.   Would perform challenge as follows: administer 10% of the desired dose of Lasix. Monitor for 24 hours and if no reaction then can give 25% the desired dose. Monitor for 24 hours, if no reaction can give full desired dose of Lasix.   If she passes challenge then Lasix can be safely administered and should be removed from her allergy list.   Notably, the protocol does not include premedication. If she develops hives, an antihistamine such as cetirizine or loratadine can be given, and we may need to premedicate her with antihistamine for subsequent doses. In this event, then for subsequent Lasix doses she should take the medication with accompanying anthistamine.     Special Instructions:   Procedure must be done in an ICU setting given her comorbidities.  0.3 cc Epinephrine (1:1000) IM, Benadryl 50 mg IV/PO and Albuterol INH must be kept at bedside at all times during procedure.   Record vital signs and exam prior to each dose to be given every 20 minutes   In case of mild allergic reaction (hives, mild swelling or itching), immediately give 50 mg Benadryl IM/IV and contact Allergy/Immunology.   In case of anaphylaxis, patient should be treated with 0.3 cc 1:1000 Epinephrine IM immediately.   Refractory cases and/or those on beta blockers may require glucagon (1-2mg IV over 5 minutes); followed by 5-15mcg/min infusion if needed.   (Symptoms of anaphylaxis include respiratory distress, hypotension, and/or involvement of 2 organ systems)     On discharge, please have the patient schedule follow up at our office:  [address] [place] [address], [address]  [place] [address]  Tel: [phone]  Fax: [phone]    Please feel free to message on MS Teams with any questions or concerns. If after 5PM please page fellow on call.     Nikki Kimura, MD   Fellow, Division of Allergy and Immunology  [facility] [employer] [facility] at [facility]

## 2023-02-14 NOTE — CONSULT NOTE ADULT - ASSESSMENT
67F with recent late presenting STEMI without prox LAD (pLAD 100%, mCx 50%, OM2 40%), HFrEF 35% now 19%, presenting with dyspnea, abdominal pain, with recent outpatient visit with Dr. Baron found to be volume overloaded, started on lasix but with allergy.     Cards tests:  1/22/23 LHC: Short LM, pLAD with 100% stenosis with faint collaterals from RPDA, Cx there is 50% stenosis in middle third portion of segment after branch to OM1, 40% stenosis in proximal third, mRCA 20% stenosis   1/22/23 TTE: LVEF 41%, MAC, tethered MV, mild MR, calcified AV with normal opening, Mod segmental LV dysfunction, apex akinetic, normal RV  2/13/23 TTE: LVEF 19%, LVIDd 5.5, No thrombus, Moderate diastolic dysfunction, Borderline decrease RV s', PASP 48

## 2023-02-14 NOTE — PROGRESS NOTE ADULT - SUBJECTIVE AND OBJECTIVE BOX
INCOMPLETE NOTE    Bulmaro Coats | PGY1| Pager: [phone]  Interval Events:    REVIEW OF SYSTEMS:  CONSTITUTIONAL: No weakness, fevers or chills  EYES/ENT: No visual changes;  No vertigo or throat pain   NECK: No pain or stiffness  RESPIRATORY: No cough, wheezing, hemoptysis; No shortness of breath  CARDIOVASCULAR: No chest pain or palpitations  GASTROINTESTINAL: No abdominal or epigastric pain. No nausea, vomiting, or hematemesis; No diarrhea or constipation. No melena or hematochezia.  GENITOURINARY: No dysuria, frequency or hematuria  NEUROLOGICAL: No numbness or weakness  SKIN: No itching, burning, rashes, or lesions   All other review of systems is negative unless indicated above.    OBJECTIVE:  ICU Vital Signs Last 24 Hrs  T(C): 36.6 (14 Feb 2023 05:50), Max: 36.6 (13 Feb 2023 11:26)  T(F): 97.8 (14 Feb 2023 05:50), Max: 97.9 (13 Feb 2023 11:26)  HR: 99 (14 Feb 2023 05:50) (99 - 112)  BP: 92/62 (14 Feb 2023 05:50) (92/62 - 106/73)  BP(mean): --  ABP: --  ABP(mean): --  RR: 18 (14 Feb 2023 05:50) (18 - 18)  SpO2: 95% (14 Feb 2023 05:50) (95% - 99%)    O2 Parameters below as of 14 Feb 2023 05:50  Patient On (Oxygen Delivery Method): room air              02-13 @ 07:01  -  02-14 @ 06:27  --------------------------------------------------------  IN: 630 mL / OUT: 400 mL / NET: 230 mL      CAPILLARY BLOOD GLUCOSE          PHYSICAL EXAM:  General: WN/WD NAD  Neurology: A&Ox3, nonfocal, MONREAL x 4  Eyes: PERRLA/ EOMI, Gross vision intact  ENT/Neck: Neck supple, trachea midline, No JVD, Gross hearing intact  Respiratory: CTA B/L, No wheezing, rales, rhonchi  CV: RRR, +S1/S2, -S3/S4, no murmurs, rubs or gallops  Abdominal: Soft, NT, ND +BS, No HSM  MSK: 5/5 strength UE/LE bilaterally  Extremities: No edema, 2+ peripheral pulses  Skin: No Rashes, Hematoma, Ecchymosis  Incisions:   Tubes:    HOSPITAL MEDICATIONS:  MEDICATIONS  (STANDING):  aspirin  chewable 81 milliGRAM(s) Oral daily  atorvastatin 80 milliGRAM(s) Oral daily  chlorhexidine 2% Cloths 1 Application(s) Topical <User Schedule>  clopidogrel Tablet 75 milliGRAM(s) Oral daily  ethacrynic acid 50 milliGRAM(s) Oral daily  metoprolol succinate ER 25 milliGRAM(s) Oral daily  pantoprazole    Tablet 40 milliGRAM(s) Oral before breakfast    MEDICATIONS  (PRN):  acetaminophen     Tablet .. 650 milliGRAM(s) Oral every 6 hours PRN Temp greater or equal to 38C (100.4F), Mild Pain (1 - 3)  aluminum hydroxide/magnesium hydroxide/simethicone Suspension 30 milliLiter(s) Oral every 4 hours PRN Dyspepsia  melatonin 3 milliGRAM(s) Oral at bedtime PRN Insomnia      LABS:                        9.9    8.18  )-----------( 519      ( 13 Feb 2023 07:00 )             31.4     Hgb Trend: 9.9<--, 11.2<--, 11.5<--  02-13    139  |  100  |  18  ----------------------------<  121<H>  3.6   |  25  |  0.86    Ca    9.1      13 Feb 2023 07:00  Phos  3.8     02-12  Mg     2.2     02-13    TPro  7.4  /  Alb  3.0<L>  /  TBili  0.4  /  DBili  x   /  AST  15  /  ALT  12  /  AlkPhos  131<H>  02-12    Creatinine Trend: 0.86<--, 1.10<--, 0.82<--, 0.77<--, 0.73<--, 0.71<--            MICROBIOLOGY:          Bulmaro Coats | PGY1| Pager: [phone]  Interval Events: NAEON    REVIEW OF SYSTEMS:  RESPIRATORY: Continued orthopnea; although with mild improvement  CARDIOVASCULAR: No chest pain or palpitations  GASTROINTESTINAL: General Abd discomfort; no GERD/Dyspepsia sx  GENITOURINARY: No dysuria, frequency or hematuria  All other review of systems is negative unless indicated above.    OBJECTIVE:  ICU Vital Signs Last 24 Hrs  T(C): 36.6 (14 Feb 2023 05:50), Max: 36.6 (13 Feb 2023 11:26)  T(F): 97.8 (14 Feb 2023 05:50), Max: 97.9 (13 Feb 2023 11:26)  HR: 99 (14 Feb 2023 05:50) (99 - 112)  BP: 92/62 (14 Feb 2023 05:50) (92/62 - 106/73)  BP(mean): --  ABP: --  ABP(mean): --  RR: 18 (14 Feb 2023 05:50) (18 - 18)  SpO2: 95% (14 Feb 2023 05:50) (95% - 99%)    O2 Parameters below as of 14 Feb 2023 05:50  Patient On (Oxygen Delivery Method): room air              02-13 @ 07:01  -  02-14 @ 06:27  --------------------------------------------------------  IN: 630 mL / OUT: 400 mL / NET: 230 mL      CAPILLARY BLOOD GLUCOSE          PHYSICAL EXAM:  General: NAD  Neurology: A&Ox3, nonfocal, MONREAL x 4  ENT/Neck: No appreciable JVD  Respiratory: CTA B/L, No wheezing, rales, rhonchi  CV: RRR, +S1/S2, -S3/S4, no murmurs, rubs or gallops; Telemetry with sinus tachy   Extremities: 1+ pitting edema, 2+ peripheral pulses  Skin: No Rashes, Hematoma, Ecchymosis    HOSPITAL MEDICATIONS:  MEDICATIONS  (STANDING):  aspirin  chewable 81 milliGRAM(s) Oral daily  atorvastatin 80 milliGRAM(s) Oral daily  chlorhexidine 2% Cloths 1 Application(s) Topical <User Schedule>  clopidogrel Tablet 75 milliGRAM(s) Oral daily  ethacrynic acid 50 milliGRAM(s) Oral daily  metoprolol succinate ER 25 milliGRAM(s) Oral daily  pantoprazole    Tablet 40 milliGRAM(s) Oral before breakfast    MEDICATIONS  (PRN):  acetaminophen     Tablet .. 650 milliGRAM(s) Oral every 6 hours PRN Temp greater or equal to 38C (100.4F), Mild Pain (1 - 3)  aluminum hydroxide/magnesium hydroxide/simethicone Suspension 30 milliLiter(s) Oral every 4 hours PRN Dyspepsia  melatonin 3 milliGRAM(s) Oral at bedtime PRN Insomnia      LABS:                        9.9    8.18  )-----------( 519      ( 13 Feb 2023 07:00 )             31.4     Hgb Trend: 9.9<--, 11.2<--, 11.5<--  02-13    139  |  100  |  18  ----------------------------<  121<H>  3.6   |  25  |  0.86    Ca    9.1      13 Feb 2023 07:00  Phos  3.8     02-12  Mg     2.2     02-13    TPro  7.4  /  Alb  3.0<L>  /  TBili  0.4  /  DBili  x   /  AST  15  /  ALT  12  /  AlkPhos  131<H>  02-12    Creatinine Trend: 0.86<--, 1.10<--, 0.82<--, 0.77<--, 0.73<--, 0.71<--            MICROBIOLOGY:

## 2023-02-14 NOTE — PROGRESS NOTE ADULT - PROBLEM SELECTOR PLAN 2
Assessment:  - Abdominal discomfort, not pain, associated with early satiety, bloating, belching, reflux  - Dyspepsia, mildly improved after holding brilinta   - Ddx: medication induced, less likely anginal component, CHF volume may be contributing although history is slightly inconsistent, some concern for stress ulcer i/s/o recent anterior wall MI     Plan:  - improved off brilinta, with improved volume status  - c/w protonix daily  - if not improved with medical optimization as above, discharge with outpatient GI follow up No hx of skin conditions prior to episode  Initiated on Lasix by Dr. Martínez Baron after discharge from hospital for fluid overload; after 2-3 doses, had new generalized erythematous pruritic rash with facial sparing. Denied any mucosal involvement/blisters. Contacted Dr. Baron who stopped Lasix. Rash resolved in 2 days without intervention.    Allergy Consulted to evaluate if true sulfa allergy    F/U Allergy Recs: No hx of skin conditions prior to episode  Initiated on Lasix by Dr. Martínez Baron after discharge from hospital for fluid overload; after 2-3 doses, had new generalized erythematous pruritic rash with facial sparing. Denied any mucosal involvement/blisters. Contacted Dr. Baron who stopped Lasix. Rash resolved in 2 days without intervention.    Allergy Consulted to evaluate if true sulfa allergy    Appreciate Allergy Recs  - Discussed with patient regarding Lasix  - Will need input from Dr. Baron regarding Lasix re-initiation; patient hesitant to restart

## 2023-02-14 NOTE — CONSULT NOTE ADULT - SUBJECTIVE AND OBJECTIVE BOX
Patient is a 67y old  Female who presents with a chief complaint of Shortness of breath (14 Feb 2023 13:59)    HPI:  67F with recent late presenting STEMI without prox LAD (pLAD 100%, mCx 50%, OM2 40%), HFrEF 35% now 19%, presenting with dyspnea, abdominal pain, with recent outpatient visit with Dr. Baron found to be volume overloaded. Allergy/Immunology consulted for allergy to lasix and need to resume lasix. Beta blocker was stopped today 2/14.   She reports that around 9AM on 2/2 she took Lasix 40 mg PO and around 3 or 4PM she developed generalized hives which lasted 2-3 days and self-resolved without intervention. She denied any angioedema, GI or respiratory symptoms. No mucosal involvement. She denies a history of random hives. Hives have not recurred since discontinuing lasix. She has no other exposures to sulfa, no other known drug allergies.       Allergies    Lasix (Rash (Mod to Severe))    Intolerances      MEDICATIONS  (STANDING):  aspirin  chewable 81 milliGRAM(s) Oral daily  atorvastatin 40 milliGRAM(s) Oral daily  chlorhexidine 2% Cloths 1 Application(s) Topical <User Schedule>  clopidogrel Tablet 75 milliGRAM(s) Oral daily  ethacrynic acid 50 milliGRAM(s) Oral daily  pantoprazole    Tablet 40 milliGRAM(s) Oral before breakfast  sacubitril 24 mG/valsartan 26 mG 1 Tablet(s) Oral two times a day  spironolactone 25 milliGRAM(s) Oral daily    MEDICATIONS  (PRN):  acetaminophen     Tablet .. 650 milliGRAM(s) Oral every 6 hours PRN Temp greater or equal to 38C (100.4F), Mild Pain (1 - 3)  aluminum hydroxide/magnesium hydroxide/simethicone Suspension 30 milliLiter(s) Oral every 4 hours PRN Dyspepsia  melatonin 3 milliGRAM(s) Oral at bedtime PRN Insomnia      PAST MEDICAL & SURGICAL HISTORY:  History of acute anterior wall MI      No significant past surgical history          REVIEW OF SYSTEMS  All review of systems negative, except for those marked:  Constitutional: [ ] negative [ ] fevers [ ] chills [ ] weight loss [ ] weight gain  HEENT: [ ] negative [ ] dry eyes [ ] eye irritation [ ] postnasal drip [ ] nasal congestion  CV: [ ] negative  [ ] chest pain [ ] orthopnea [ ] palpitations [ ] murmur  Resp: [ ] negative [ ] cough [ ] shortness of breath [ ] dyspnea [ ] wheezing [ ] sputum [ ] hemoptysis  GI: [ ] negative [ ] nausea [ ] vomiting [ ] diarrhea [ ] constipation [ ] abd pain [ ] dysphagia   : [ ] negative [ ] dysuria [ ] nocturia [ ] hematuria [ ] increased urinary frequency  Musculoskeletal: [ ] negative [ ] back pain [ ] myalgias [ ] arthralgias [ ] fracture  Skin: [ ] negative [ ] rash [ ] itch  Neurological: [ ] negative [ ] headache [ ] dizziness [ ] syncope [ ] weakness [ ] numbness  Psychiatric: [ ] negative [ ] anxiety [ ] depression  Endocrine: [ ] negative [ ] diabetes [ ] thyroid problem  Hematologic/Lymphatic: [ ] negative [ ] anemia [ ] bleeding problem  Allergic/Immunologic: [ ] negative [ ] itchy eyes [ ] nasal discharge [ ] hives [ ] angioedema  [ ] All other systems negative  [ ] Unable to assess ROS because ________    SOCIAL/ENVIRONMENTAL HISTORY:  Family Lives:  Education Level:  Tobacco	[] No	[] Yes:  Alcohol		[] No	[] Yes:    FAMILY HISTORY:  No pertinent family history in first degree relatives      Allergies		[] No	[] Yes:   Miscarriages		[] No	[] Yes:   Autoimmune		[] No	[] Yes:   Asthma			[] No	[] Yes:  Still Births		[] No	[] Yes:  Sinusitis		        [] No	[] Yes:  Fetal Demise		[] No	[] Yes:   Immune Deficiency	[] No	[] Yes:   Other:			[] No	[] Yes:    Vital Signs Last 24 Hrs  T(C): 36.7 (14 Feb 2023 11:45), Max: 36.7 (14 Feb 2023 11:45)  T(F): 98.1 (14 Feb 2023 11:45), Max: 98.1 (14 Feb 2023 11:45)  HR: 94 (14 Feb 2023 15:14) (94 - 109)  BP: 92/58 (14 Feb 2023 15:14) (92/58 - 109/78)  BP(mean): --  RR: 18 (14 Feb 2023 15:14) (18 - 18)  SpO2: 97% (14 Feb 2023 11:45) (95% - 99%)    Parameters below as of 14 Feb 2023 11:45  Patient On (Oxygen Delivery Method): room air        PHYSICAL EXAM  All physical exam findings normal, except for those marked:  General: alert, well developed/well nourished, no acute distress  Eyes: no conjunctival injection, no discharge, no photophobia, intact extraocular movements, sclera not icteric, no suborbital bogginess  ENT: normal tongue and lips  Neck: supple, full range of motion  Lymph Nodes: normal size and consistency, non-tender  Cardiovascular: regular rate and rhythm; Normal S1, S2; No murmur  Respiratory: good air movement bilaterally, no wheezing or crackles,  no retractions  Skin: skin intact and not indurated; no rash, no desquamation  Neurologic: alert, appropriate for age, cranial nerves II-XII grossly normal  Musculoskeletal: +edema to bilateral ankles     Lab Results:                        10.4   8.02  )-----------( 507      ( 14 Feb 2023 06:46 )             33.3     02-14    138  |  99  |  17  ----------------------------<  117<H>  3.2<L>   |  28  |  0.80    Ca    9.2      14 Feb 2023 06:46  Phos  3.9     02-14  Mg     2.2     02-14    TPro  7.4  /  Alb  3.0<L>  /  TBili  0.4  /  DBili  x   /  AST  15  /  ALT  12  /  AlkPhos  131<H>  02-12    LIVER FUNCTIONS - ( 12 Feb 2023 20:33 )  Alb: 3.0 g/dL / Pro: 7.4 g/dL / ALK PHOS: 131 U/L / ALT: 12 U/L / AST: 15 U/L / GGT: x                 IMAGING STUDIES:   Patient is a 67y old  Female who presents with a chief complaint of Shortness of breath (14 Feb 2023 13:59)    HPI:  67F with recent late presenting STEMI without prox LAD (pLAD 100%, mCx 50%, OM2 40%), HFrEF 35% now 19%, presenting with dyspnea, abdominal pain, with recent outpatient visit with Dr. Baron found to be volume overloaded. Allergy/Immunology consulted for allergy to lasix and need to resume lasix. Beta blocker was stopped today 2/14.   She reports that around 9AM on 2/2 she took Lasix 40 mg PO and around 3 or 4PM she developed generalized hives which lasted 2-3 days and self-resolved without intervention. She denied any angioedema, GI or respiratory symptoms. No mucosal involvement. She denies a history of random hives. Hives have not recurred since discontinuing lasix. She has no other exposures to sulfa, no other known drug allergies.       Allergies    Lasix (Rash (Mod to Severe))    Intolerances      MEDICATIONS  (STANDING):  aspirin  chewable 81 milliGRAM(s) Oral daily  atorvastatin 40 milliGRAM(s) Oral daily  chlorhexidine 2% Cloths 1 Application(s) Topical <User Schedule>  clopidogrel Tablet 75 milliGRAM(s) Oral daily  ethacrynic acid 50 milliGRAM(s) Oral daily  pantoprazole    Tablet 40 milliGRAM(s) Oral before breakfast  sacubitril 24 mG/valsartan 26 mG 1 Tablet(s) Oral two times a day  spironolactone 25 milliGRAM(s) Oral daily    MEDICATIONS  (PRN):  acetaminophen     Tablet .. 650 milliGRAM(s) Oral every 6 hours PRN Temp greater or equal to 38C (100.4F), Mild Pain (1 - 3)  aluminum hydroxide/magnesium hydroxide/simethicone Suspension 30 milliLiter(s) Oral every 4 hours PRN Dyspepsia  melatonin 3 milliGRAM(s) Oral at bedtime PRN Insomnia      PAST MEDICAL & SURGICAL HISTORY:  History of acute anterior wall MI      No significant past surgical history          REVIEW OF SYSTEMS  All review of systems negative, except for those marked:  Constitutional: [ ] negative [ ] fevers [ ] chills [ ] weight loss [ ] weight gain  HEENT: [ ] negative [ ] dry eyes [ ] eye irritation [ ] postnasal drip [ ] nasal congestion  CV: [ ] negative  [ ] chest pain [ ] orthopnea [ ] palpitations [ ] murmur  Resp: [ ] negative [ ] cough [ ] shortness of breath [ ] dyspnea [ ] wheezing [ ] sputum [ ] hemoptysis  GI: [ ] negative [ ] nausea [ ] vomiting [ ] diarrhea [ ] constipation [ ] abd pain [ ] dysphagia   : [ ] negative [ ] dysuria [ ] nocturia [ ] hematuria [ ] increased urinary frequency  Musculoskeletal: [ ] negative [ ] back pain [ ] myalgias [ ] arthralgias [ ] fracture  Skin: [ ] negative [ ] rash [ ] itch  Neurological: [ ] negative [ ] headache [ ] dizziness [ ] syncope [ ] weakness [ ] numbness  Psychiatric: [ ] negative [ ] anxiety [ ] depression  Endocrine: [ ] negative [ ] diabetes [ ] thyroid problem  Hematologic/Lymphatic: [ ] negative [ ] anemia [ ] bleeding problem  Allergic/Immunologic: [ ] negative [ ] itchy eyes [ ] nasal discharge [ ] hives [ ] angioedema  [ ] All other systems negative  [ ] Unable to assess ROS because ________    SOCIAL/ENVIRONMENTAL HISTORY:  Family Lives:  Education Level:  Tobacco	X No	[] Yes:  Alcohol		X No	[] Yes:    FAMILY HISTORY:  No pertinent family history in first degree relatives      Vital Signs Last 24 Hrs  T(C): 36.7 (14 Feb 2023 11:45), Max: 36.7 (14 Feb 2023 11:45)  T(F): 98.1 (14 Feb 2023 11:45), Max: 98.1 (14 Feb 2023 11:45)  HR: 94 (14 Feb 2023 15:14) (94 - 109)  BP: 92/58 (14 Feb 2023 15:14) (92/58 - 109/78)  BP(mean): --  RR: 18 (14 Feb 2023 15:14) (18 - 18)  SpO2: 97% (14 Feb 2023 11:45) (95% - 99%)    Parameters below as of 14 Feb 2023 11:45  Patient On (Oxygen Delivery Method): room air        PHYSICAL EXAM  All physical exam findings normal, except for those marked:  General: alert, well developed/well nourished, no acute distress  Eyes: no conjunctival injection, no discharge, no photophobia, intact extraocular movements, sclera not icteric, no suborbital bogginess  ENT: normal tongue and lips  Neck: supple, full range of motion  Lymph Nodes: normal size and consistency, non-tender  Cardiovascular: regular rate and rhythm; Normal S1, S2; No murmur  Respiratory: good air movement bilaterally, no wheezing or crackles,  no retractions  Skin: skin intact and not indurated; no rash, no desquamation  Neurologic: alert, appropriate for age, cranial nerves II-XII grossly normal  Musculoskeletal: +edema to bilateral ankles     Lab Results:                        10.4   8.02  )-----------( 507      ( 14 Feb 2023 06:46 )             33.3     02-14    138  |  99  |  17  ----------------------------<  117<H>  3.2<L>   |  28  |  0.80    Ca    9.2      14 Feb 2023 06:46  Phos  3.9     02-14  Mg     2.2     02-14    TPro  7.4  /  Alb  3.0<L>  /  TBili  0.4  /  DBili  x   /  AST  15  /  ALT  12  /  AlkPhos  131<H>  02-12    LIVER FUNCTIONS - ( 12 Feb 2023 20:33 )  Alb: 3.0 g/dL / Pro: 7.4 g/dL / ALK PHOS: 131 U/L / ALT: 12 U/L / AST: 15 U/L / GGT: x

## 2023-02-14 NOTE — PROGRESS NOTE ADULT - PROBLEM SELECTOR PLAN 1
Assessment:  - HFrEF 2/2 ICM (late presentation anterior MI 1/21/23 follows w/ Dr. Bradley Baron)  - TTE 1/22/23: EF 41%, distal septum, anterolateral wall akinetic   - dyspnea when lying flat, crackles, BNP 10K from 2K two weeks prior  - switch from brilinta to plavix per Dr. Baron  - appreciate card recs  - had allergic reaction to lasix  - suspect HFrEF exacerbation but likely significant contribution from anxiety as well    Plan:  - orthopnea improved with diuresis  - holding entresto per patient agreement w/ Dr. Baron  - c/w toprol 25 QHS per patient request   - dose diuretic per cards  - strict I&Os, standing daily weights, replete lytes K > 4, Mg > 2  - monitor on tele  - Monitor QTC Assessment:  - HFrEF 2/2 ICM (late presentation anterior MI 1/21/23 follows w/ Dr. Bradley Baron)  - TTE 1/22/23: EF 41%, distal septum, anterolateral wall akinetic   - dyspnea when lying flat, crackles, BNP 10K from 2K two weeks prior  - switch from brilinta to plavix per Dr. Baron  - had allergic reaction to lasix  - suspect HFrEF exacerbation but likely significant contribution from anxiety as well  - Appreciate Cards and HF Recs  Plan:  - orthopnea improved with diuresis  - strict I&Os, standing daily weights, replete lytes K > 4, Mg > 2  - monitor on tele  - Monitor QTC  - Holding Metoprolol  - C/W Entresto with Parameters SBP < 85  - Ethacrynic Acid 50mg qd  - SPironolactone 25mg qd

## 2023-02-14 NOTE — PROGRESS NOTE ADULT - PROBLEM SELECTOR PLAN 5
- DVT ppx: lovenox  - Diet: Dash  - Dispo: Home - unclear etiology, patient missed Toprol dose today  - low suspicion for PE  - patient currently with significant abdominal discomfort, and anxiety  - continue to monitor

## 2023-02-14 NOTE — PROGRESS NOTE ADULT - PROBLEM SELECTOR PLAN 3
- late presentation anterior wall MI (1/21/23)  - [facility]  1/22/23: pLAD 100%, mCx 50%, OM2 40%  - trops, LFTs downtrending since event 2 weeks prior  - denies anginal symptoms since event Assessment:  - Abdominal discomfort, not pain, associated with early satiety, bloating, belching, reflux  - Dyspepsia, mildly improved after holding brilinta   - Ddx: medication induced, less likely anginal component, CHF volume may be contributing although history is slightly inconsistent, some concern for stress ulcer i/s/o recent anterior wall MI     Plan:  - improved off brilinta, with improved volume status  - c/w protonix daily  - if not improved with medical optimization as above, discharge with outpatient GI follow up

## 2023-02-15 ENCOUNTER — NON-APPOINTMENT (OUTPATIENT)
Age: 68
End: 2023-02-15

## 2023-02-15 LAB
ANION GAP SERPL CALC-SCNC: 11 MMOL/L — SIGNIFICANT CHANGE UP (ref 5–17)
BUN SERPL-MCNC: 20 MG/DL — SIGNIFICANT CHANGE UP (ref 7–23)
CALCIUM SERPL-MCNC: 9.1 MG/DL — SIGNIFICANT CHANGE UP (ref 8.4–10.5)
CHLORIDE SERPL-SCNC: 101 MMOL/L — SIGNIFICANT CHANGE UP (ref 96–108)
CO2 SERPL-SCNC: 24 MMOL/L — SIGNIFICANT CHANGE UP (ref 22–31)
CREAT SERPL-MCNC: 0.88 MG/DL — SIGNIFICANT CHANGE UP (ref 0.5–1.3)
EGFR: 72 ML/MIN/1.73M2 — SIGNIFICANT CHANGE UP
GLUCOSE SERPL-MCNC: 114 MG/DL — HIGH (ref 70–99)
HCT VFR BLD CALC: 31.5 % — LOW (ref 34.5–45)
HGB BLD-MCNC: 10 G/DL — LOW (ref 11.5–15.5)
MAGNESIUM SERPL-MCNC: 2.2 MG/DL — SIGNIFICANT CHANGE UP (ref 1.6–2.6)
MCHC RBC-ENTMCNC: 28.1 PG — SIGNIFICANT CHANGE UP (ref 27–34)
MCHC RBC-ENTMCNC: 31.7 GM/DL — LOW (ref 32–36)
MCV RBC AUTO: 88.5 FL — SIGNIFICANT CHANGE UP (ref 80–100)
NRBC # BLD: 0 /100 WBCS — SIGNIFICANT CHANGE UP (ref 0–0)
PHOSPHATE SERPL-MCNC: 3.9 MG/DL — SIGNIFICANT CHANGE UP (ref 2.5–4.5)
PLATELET # BLD AUTO: 472 K/UL — HIGH (ref 150–400)
POTASSIUM SERPL-MCNC: 3.6 MMOL/L — SIGNIFICANT CHANGE UP (ref 3.5–5.3)
POTASSIUM SERPL-SCNC: 3.6 MMOL/L — SIGNIFICANT CHANGE UP (ref 3.5–5.3)
RBC # BLD: 3.56 M/UL — LOW (ref 3.8–5.2)
RBC # FLD: 14.8 % — HIGH (ref 10.3–14.5)
SODIUM SERPL-SCNC: 136 MMOL/L — SIGNIFICANT CHANGE UP (ref 135–145)
WBC # BLD: 9.06 K/UL — SIGNIFICANT CHANGE UP (ref 3.8–10.5)
WBC # FLD AUTO: 9.06 K/UL — SIGNIFICANT CHANGE UP (ref 3.8–10.5)

## 2023-02-15 PROCEDURE — 99233 SBSQ HOSP IP/OBS HIGH 50: CPT

## 2023-02-15 PROCEDURE — 99292 CRITICAL CARE ADDL 30 MIN: CPT

## 2023-02-15 PROCEDURE — 99291 CRITICAL CARE FIRST HOUR: CPT

## 2023-02-15 PROCEDURE — 93010 ELECTROCARDIOGRAM REPORT: CPT

## 2023-02-15 PROCEDURE — 99233 SBSQ HOSP IP/OBS HIGH 50: CPT | Mod: GC

## 2023-02-15 RX ORDER — POTASSIUM CHLORIDE 20 MEQ
40 PACKET (EA) ORAL ONCE
Refills: 0 | Status: COMPLETED | OUTPATIENT
Start: 2023-02-15 | End: 2023-02-15

## 2023-02-15 RX ORDER — EPINEPHRINE 0.3 MG/.3ML
0.3 INJECTION INTRAMUSCULAR; SUBCUTANEOUS ONCE
Refills: 0 | Status: DISCONTINUED | OUTPATIENT
Start: 2023-02-15 | End: 2023-02-17

## 2023-02-15 RX ORDER — BUMETANIDE 0.25 MG/ML
0.2 INJECTION INTRAMUSCULAR; INTRAVENOUS ONCE
Refills: 0 | Status: COMPLETED | OUTPATIENT
Start: 2023-02-15 | End: 2023-02-15

## 2023-02-15 RX ORDER — BENZOCAINE AND MENTHOL 5; 1 G/100ML; G/100ML
1 LIQUID ORAL EVERY 6 HOURS
Refills: 0 | Status: DISCONTINUED | OUTPATIENT
Start: 2023-02-15 | End: 2023-02-20

## 2023-02-15 RX ADMIN — CLOPIDOGREL BISULFATE 75 MILLIGRAM(S): 75 TABLET, FILM COATED ORAL at 12:05

## 2023-02-15 RX ADMIN — ATORVASTATIN CALCIUM 40 MILLIGRAM(S): 80 TABLET, FILM COATED ORAL at 12:05

## 2023-02-15 RX ADMIN — SACUBITRIL AND VALSARTAN 1 TABLET(S): 24; 26 TABLET, FILM COATED ORAL at 17:34

## 2023-02-15 RX ADMIN — Medication 81 MILLIGRAM(S): at 12:05

## 2023-02-15 RX ADMIN — BENZOCAINE AND MENTHOL 1 LOZENGE: 5; 1 LIQUID ORAL at 11:30

## 2023-02-15 RX ADMIN — SACUBITRIL AND VALSARTAN 1 TABLET(S): 24; 26 TABLET, FILM COATED ORAL at 05:29

## 2023-02-15 RX ADMIN — CHLORHEXIDINE GLUCONATE 1 APPLICATION(S): 213 SOLUTION TOPICAL at 05:31

## 2023-02-15 RX ADMIN — Medication 40 MILLIEQUIVALENT(S): at 10:18

## 2023-02-15 RX ADMIN — ETHACRYNIC ACID 50 MILLIGRAM(S): 25 TABLET ORAL at 05:30

## 2023-02-15 RX ADMIN — SPIRONOLACTONE 25 MILLIGRAM(S): 25 TABLET, FILM COATED ORAL at 05:30

## 2023-02-15 RX ADMIN — BUMETANIDE 0.2 MILLIGRAM(S): 0.25 INJECTION INTRAMUSCULAR; INTRAVENOUS at 16:17

## 2023-02-15 RX ADMIN — Medication 30 MILLILITER(S): at 05:31

## 2023-02-15 NOTE — PROGRESS NOTE ADULT - ASSESSMENT
67F with recent late presenting STEMI without prox LAD (pLAD 100%, mCx 50%, OM2 40%), HFrEF 35% now 19%, presenting with dyspnea, abdominal pain, with recent outpatient visit with Dr. Baron found to be volume overloaded, started on lasix but reportedly developed allergic reaction (full body hives). She has been diuresed with ethacrynic acid.     Cards tests:  1/22/23 LHC: Short LM, pLAD with 100% stenosis with faint collaterals from RPDA, Cx there is 50% stenosis in middle third portion of segment after branch to OM1, 40% stenosis in proximal third, mRCA 20% stenosis   1/22/23 TTE: LVEF 41%, MAC, tethered MV, mild MR, calcified AV with normal opening, Mod segmental LV dysfunction, apex akinetic, normal RV  2/13/23 TTE: LVEF 19%, LVIDd 5.5, No thrombus, Moderate diastolic dysfunction, Borderline decrease RV s', PASP 48        Problem/Recommendation - 1:  ·  Problem: CHF exacerbation.   ·  Recommendation: -Daily weights, I/Os, Mg >2, K > 4  - Currently on ethacrynic acid 50 mg QD  - Plan for bumex challenge as per allergy protocol, and patient will need to be in an ICU. Discussed with CCU team for ICU bed  - Last beta-blocker dose :Toprolol 25mg o n 2/14 at 1000, continue to hold betablockers  - Entresto 24/26  - Start spironolactone 25mg daily    Recommendations are preliminary until attending attestation.    Clayton Gross MD  Cardiology Fellow- PGY 4

## 2023-02-15 NOTE — PROGRESS NOTE ADULT - SUBJECTIVE AND OBJECTIVE BOX
Cardiology Progress Note  ------------------------------------------------------------------------------------------  SUBJECTIVE:   No events overnight. Denies CP, SOB or Palpitations.   -------------------------------------------------------------------------------------------  ROS:  CV: chest pain (-), palpitation (-), orthopnea (-), PND (-), edema (-)  PULM: SOB (-), cough (-), wheezing (-), hemoptysis (-).   CONST: fever (-), chills (-) or fatigue (-)  GI: abdominal distension (-), abdominal pain (-) , nausea/vomiting (-), hematemesis, (-), melena (-), hematochezia (-)  : dysuria (-), frequency (-), hematuria (-).   NEURO: numbness (-), weakness (-), dizziness (-)  SKIN: itching (-), rash (-)  HEENT:  visual changes (-); vertigo or throat pain (-);  neck stiffness (-)     All other review of systems is negative unless indicated above.   -------------------------------------------------------------------------------------------  VS:  T(F): 98.3 (02-15), Max: 98.3 (02-15)  HR: 93 (02-15) (90 - 102)  BP: 90/59 (02-15) (90/59 - 96/64)  RR: 18 (02-15)  SpO2: 95% (02-15)  I&O's Summary    14 Feb 2023 07:01  -  15 Feb 2023 07:00  --------------------------------------------------------  IN: 962 mL / OUT: 0 mL / NET: 962 mL    15 Feb 2023 07:01  -  15 Feb 2023 13:52  --------------------------------------------------------  IN: 480 mL / OUT: 240 mL / NET: 240 mL      ------------------------------------------------------------------------------------------  PHYSICAL EXAM:  GENERAL: NAD  HEAD:  Atraumatic, Normocephalic.  EYES: EOMI, PERRLA, conjunctiva and sclera clear.  ENT: Moist mucous membranes.  NECK: Supple, No JVD.  CHEST/LUNG: Clear to auscultation bilaterally; No rales, rhonchi, wheezing, or rubs. Unlabored respirations.  HEART: Regular rate and rhythm; No murmurs, rubs, or gallops.  ABDOMEN: Bowel sounds present; Soft, Nontender, Nondistended.   EXTREMITIES:  Trace edema  PSYCH: Normal affect.  SKIN: No rashes or lesions.  -------------------------------------------------------------------------------------------  LABS:                          10.0   9.06  )-----------( 472      ( 15 Feb 2023 06:27 )             31.5     02-15    136  |  101  |  20  ----------------------------<  114<H>  3.6   |  24  |  0.88    Ca    9.1      15 Feb 2023 06:27  Phos  3.9     02-15  Mg     2.2     02-15    CARDIAC MARKERS ( 12 Feb 2023 20:33 )  293 ng/L / x     / x     / x     / x     / x      CARDIAC MARKERS ( 11 Feb 2023 12:23 )  303 ng/L / x     / x     / x     / x     / x        -------------------------------------------------------------------------------------------  Meds:  acetaminophen     Tablet .. 650 milliGRAM(s) Oral every 6 hours PRN  aluminum hydroxide/magnesium hydroxide/simethicone Suspension 30 milliLiter(s) Oral every 4 hours PRN  aspirin  chewable 81 milliGRAM(s) Oral daily  atorvastatin 40 milliGRAM(s) Oral daily  benzocaine/menthol Lozenge 1 Lozenge Oral every 6 hours  chlorhexidine 2% Cloths 1 Application(s) Topical <User Schedule>  clopidogrel Tablet 75 milliGRAM(s) Oral daily  ethacrynic acid 50 milliGRAM(s) Oral daily  melatonin 3 milliGRAM(s) Oral at bedtime PRN  pantoprazole    Tablet 40 milliGRAM(s) Oral before breakfast  sacubitril 24 mG/valsartan 26 mG 1 Tablet(s) Oral two times a day  spironolactone 25 milliGRAM(s) Oral daily    -------------------------------------------------------------------------------------------

## 2023-02-15 NOTE — PROGRESS NOTE ADULT - PROBLEM SELECTOR PLAN 4
- late presentation anterior wall MI (1/21/23)  - [facility]  1/22/23: pLAD 100%, mCx 50%, OM2 40%  - trops, LFTs downtrending since event 2 weeks prior  - denies anginal symptoms since event - late presentation anterior wall MI (1/21/23)  - [facility]  1/22/23: pLAD 100%, mCx 50%, OM2 40%  - trops, LFTs downtrending since event 2 weeks prior  - denies anginal symptoms since event  - C/W DAPT (ASA, Plavix)

## 2023-02-15 NOTE — PROGRESS NOTE ADULT - PROBLEM SELECTOR PLAN 6
- DVT ppx: lovenox  - Diet: Dash  - Dispo: Home - DVT ppx: lovenox  - Diet: Dash  - Dispo: Pending CICU Bumex Desensitization

## 2023-02-15 NOTE — CHART NOTE - NSCHARTNOTEFT_GEN_A_CORE
HF:  Brief chart note,    Loop diuretics are an integral part of her HF medication. We will use bumex but we are still concerned as the allergy may be related to sulfa. We discussed with her outpatient cardiologist as well. Please give 2 mg of IV bumex today with the allergy protocol.

## 2023-02-15 NOTE — PROGRESS NOTE ADULT - PROBLEM SELECTOR PLAN 2
No hx of skin conditions prior to episode  Initiated on Lasix by Dr. Martínez Baron after discharge from hospital for fluid overload; after 2-3 doses, had new generalized erythematous pruritic rash with facial sparing. Denied any mucosal involvement/blisters. Contacted Dr. Baron who stopped Lasix. Rash resolved in 2 days without intervention.    Allergy Consulted to evaluate if true sulfa allergy    Appreciate Allergy Recs  - Discussed with patient regarding Lasix  - Will need input from Dr. Baron regarding Lasix re-initiation; patient hesitant to restart No hx of skin conditions prior to episode  Initiated on Lasix by Dr. Martínez Baron after discharge from hospital for fluid overload; after 2-3 doses, had new generalized erythematous pruritic rash with facial sparing. Denied any mucosal involvement/blisters. Contacted Dr. Baron who stopped Lasix. Rash resolved in 2 days without intervention.    Allergy Consulted to evaluate if true sulfa allergy    Appreciate Allergy Recs  - Discussed with patient regarding bumex/lasix  - Dr. Baron is in agreement with plan  - Patient agreeable to bumex desensitization

## 2023-02-15 NOTE — PROGRESS NOTE ADULT - SUBJECTIVE AND OBJECTIVE BOX
Interval History:  Plan to start allergy desensitization for bumex     Medications:  acetaminophen     Tablet .. 650 milliGRAM(s) Oral every 6 hours PRN  aluminum hydroxide/magnesium hydroxide/simethicone Suspension 30 milliLiter(s) Oral every 4 hours PRN  aspirin  chewable 81 milliGRAM(s) Oral daily  atorvastatin 40 milliGRAM(s) Oral daily  benzocaine/menthol Lozenge 1 Lozenge Oral every 6 hours  chlorhexidine 2% Cloths 1 Application(s) Topical <User Schedule>  clopidogrel Tablet 75 milliGRAM(s) Oral daily  EPINEPHrine     1 mG/mL Injectable 0.3 milliGRAM(s) IntraMuscular once  ethacrynic acid 50 milliGRAM(s) Oral daily  melatonin 3 milliGRAM(s) Oral at bedtime PRN  pantoprazole    Tablet 40 milliGRAM(s) Oral before breakfast  sacubitril 24 mG/valsartan 26 mG 1 Tablet(s) Oral two times a day  spironolactone 25 milliGRAM(s) Oral daily      Vitals:  T(C): 36.7 (02-15-23 @ 14:43), Max: 36.8 (02-15-23 @ 04:10)  HR: 98 (02-15-23 @ 16:00) (90 - 102)  BP: 103/56 (02-15-23 @ 16:00) (90/59 - 109/63)  BP(mean): 73 (02-15-23 @ 16:00) (73 - 78)  RR: 20 (02-15-23 @ 16:00) (18 - 20)  SpO2: 95% (02-15-23 @ 16:00) (95% - 96%)    Daily     Daily Weight in k.2 (15 Feb 2023 07:33)        I&O's Summary    2023 07:01  -  15 Feb 2023 07:00  --------------------------------------------------------  IN: 962 mL / OUT: 0 mL / NET: 962 mL    15 Feb 2023 07:01  -  15 Feb 2023 16:50  --------------------------------------------------------  IN: 480 mL / OUT: 240 mL / NET: 240 mL        Physical Exam:  GENERAL: No acute distress, well-developed  HEAD:  Atraumatic, Normocephalic  ENT: elevated JVP   CHEST/LUNG: decreased breath sounds at bases   BACK: No spinal tenderness  HEART: Regular rate and rhythm; No murmurs, rubs, or gallops  ABDOMEN: Soft, Nontender, Nondistended; Bowel sounds present  EXTREMITIES:  trace edema   PSYCH: Nl behavior, nl affect  NEUROLOGY: AAOx3, non-focal, cranial nerves intact  SKIN: Normal color, No rashes or lesions    Labs:                        10.0   9.06  )-----------( 472      ( 15 Feb 2023 06:27 )             31.5     02-15    136  |  101  |  20  ----------------------------<  114<H>  3.6   |  24  |  0.88    Ca    9.1      15 Feb 2023 06:27  Phos  3.9     02-15  Mg     2.2     02-15              TELEMETRY:    Echocardiogram:

## 2023-02-15 NOTE — PROGRESS NOTE ADULT - PROBLEM SELECTOR PLAN 1
Assessment:  - HFrEF 2/2 ICM (late presentation anterior MI 1/21/23 follows w/ Dr. Bradley Baron)  - TTE 1/22/23: EF 41%, distal septum, anterolateral wall akinetic   - dyspnea when lying flat, crackles, BNP 10K from 2K two weeks prior  - switch from brilinta to plavix per Dr. Baron  - had allergic reaction to lasix  - suspect HFrEF exacerbation but likely significant contribution from anxiety as well  - Appreciate Cards and HF Recs  Plan:  - orthopnea improved with diuresis  - strict I&Os, standing daily weights, replete lytes K > 4, Mg > 2  - monitor on tele  - Monitor QTC  - Holding Metoprolol  - C/W Entresto with Parameters SBP < 85  - Ethacrynic Acid 50mg qd  - SPironolactone 25mg qd Assessment:  - HFrEF 2/2 ICM (late presentation anterior MI 1/21/23 follows w/ Dr. Bradley Baron)  - TTE 1/22/23: EF 41%, distal septum, anterolateral wall akinetic   - dyspnea when lying flat, crackles, BNP 10K from 2K two weeks prior  - switch from brilinta to plavix per Dr. Baron  - had allergic reaction to lasix  - suspect HFrEF exacerbation but likely significant contribution from anxiety as well  - Appreciate Cards and HF Recs  Plan:  - orthopnea improved slightly  - strict I&Os, standing daily weights, replete lytes K > 4, Mg > 2  - monitor on tele  - Monitor QTC  - Holding Metoprolol  - C/W Entresto with Parameters SBP < 85  - Ethacrynic Acid 50mg qd SBP < 85  - SPironolactone 25mg qd SBP < 85  - Plan for Bumex Desensitization in the CICU

## 2023-02-15 NOTE — PROGRESS NOTE ADULT - SUBJECTIVE AND OBJECTIVE BOX
REGINALD MARK  MRN-[id number]  Patient is a 67y old  Female who presents with a chief complaint of Shortness of breath (15 Feb 2023 16:50)    HPI:  67F recently admitted to [facility] with late presentation anterior MI (Jan 2022), CAD w/ 100% pLAD, HFrEF (EF 40%) likely ICM presenting with persistent abdominal discomfort and shortness of breath at night.     After discharge from hospital for anterior wall STEMI, has had persistent abdominal discomfort and increasing dyspnea when lying flat. Dyspnea is not present when upright or with exertion but has been unable to lie flat at night. Denies peripheral edema or weight gain. Denies chest pain or diaphoresis since the initial STEMI. Was seen by outpatient cardiologist Dr. Baron and was started on Lasix however developed a diffuse rash < 1 day and therefore discontinued the medication. She presents today because she woke up in the middle of the night gasping for air with worsening abdominal discomfort.    Abdominal discomfort, she describes not as pain, but with belching, early satiety, some acidic taste in the morning. Protonix has not provided her relief although holding the brilinta one night did help. Was asked to switch brilinta to plavix for this reason by Dr. Baron.     In ED, vitals were normal, EKG was unchanged from prior, Labs were overall unremarkable, CXR with small R PLEF. Cardiology was consulted, and patient was started on metolazone for diuresis.  (11 Feb 2023 16:23)      Surgery/Hospital course:    Overnight events:    REVIEW OF SYSTEMS:    CONSTITUTIONAL: No weakness, fevers or chills  EYES/ENT: No visual changes;  No vertigo or throat pain   NECK: No pain or stiffness  RESPIRATORY: No cough, wheezing, hemoptysis; No shortness of breath  CARDIOVASCULAR: No chest pain or palpitations  GASTROINTESTINAL: No abdominal or epigastric pain. No nausea, vomiting, or hematemesis; No diarrhea or constipation. No melena or hematochezia.  GENITOURINARY: No dysuria, frequency or hematuria  NEUROLOGICAL: No numbness or weakness  SKIN: No itching, rashes      Physical Exam:  Vital Signs Last 24 Hrs  T(C): 36.5 (15 Feb 2023 19:00), Max: 36.8 (15 Feb 2023 04:10)  T(F): 97.7 (15 Feb 2023 19:00), Max: 98.3 (15 Feb 2023 04:10)  HR: 100 (15 Feb 2023 22:00) (90 - 107)  BP: 102/65 (15 Feb 2023 22:00) (85/49 - 109/63)  BP(mean): 78 (15 Feb 2023 22:00) (62 - 83)  RR: 28 (15 Feb 2023 22:00) (18 - 30)  SpO2: 96% (15 Feb 2023 22:00) (95% - 99%)    Parameters below as of 15 Feb 2023 22:00  Patient On (Oxygen Delivery Method): room air      Physical Exam:   Constitutional: NAD, well-groomed, well-developed  HEENT: PERRLA, EOMI, no drainage or redness  Neck: supple,  No JVD  Respiratory: Breath Sounds equal & clear bilaterally to auscultation, no rales/rhonchi/wheezing, no accessory muscle use noted  Cardiovascular: Regular rate, regular rhythm, normal S1, S2; no murmurs or rub  Gastrointestinal: Soft, non-tender, non distended, + bowel sounds  Extremities: MONREAL x 4, no peripheral edema, no cyanosis, no clubbing   Neurological: A+O x 3; speech clear and intact; no sensory, motor  deficits, normal reflexes  Skin: warm, dry, well perfused  Incisions:    ============================I/O===========================   I&O's Detail    14 Feb 2023 07:01  -  15 Feb 2023 07:00  --------------------------------------------------------  IN:    Oral Fluid: 962 mL  Total IN: 962 mL    OUT:  Total OUT: 0 mL    Total NET: 962 mL      15 Feb 2023 07:01  -  15 Feb 2023 23:12  --------------------------------------------------------  IN:    Oral Fluid: 600 mL  Total IN: 600 mL    OUT:    Voided (mL): 665 mL  Total OUT: 665 mL    Total NET: -65 mL        ============================ LABS =========================                        10.0   9.06  )-----------( 472      ( 15 Feb 2023 06:27 )             31.5     02-15    136  |  101  |  20  ----------------------------<  114<H>  3.6   |  24  |  0.88    Ca    9.1      15 Feb 2023 06:27  Phos  3.9     02-15  Mg     2.2     02-15              ======================Micro/Rad/Cardio=================  Culture: Reviewed   CXR: Reviewed  Echo:Reviewed  ======================================================  PAST MEDICAL & SURGICAL HISTORY:  History of acute anterior wall MI      No significant past surgical history        ====================ASSESSMENT ==============  CNS:  RES:  CVS:  Hem:  Sandro:  GI:  Endo:  ID:  Skin  Plan:  ====================== NEUROLOGY=====================  acetaminophen     Tablet .. 650 milliGRAM(s) Oral every 6 hours PRN Temp greater or equal to 38C (100.4F), Mild Pain (1 - 3)  melatonin 3 milliGRAM(s) Oral at bedtime PRN Insomnia    ==================== RESPIRATORY======================  Mechanical Ventilation:      ====================CARDIOVASCULAR==================  EPINEPHrine     1 mG/mL Injectable 0.3 milliGRAM(s) IntraMuscular once  ethacrynic acid 50 milliGRAM(s) Oral daily  sacubitril 24 mG/valsartan 26 mG 1 Tablet(s) Oral two times a day  spironolactone 25 milliGRAM(s) Oral daily    ===================HEMATOLOGIC/ONC ===================  aspirin  chewable 81 milliGRAM(s) Oral daily  clopidogrel Tablet 75 milliGRAM(s) Oral daily    ===================== RENAL =========================  Continue monitoring urine output    ==================== GASTROINTESTINAL===================  aluminum hydroxide/magnesium hydroxide/simethicone Suspension 30 milliLiter(s) Oral every 4 hours PRN Dyspepsia  pantoprazole    Tablet 40 milliGRAM(s) Oral before breakfast    =======================    ENDOCRINE  =====================  atorvastatin 40 milliGRAM(s) Oral daily    ========================INFECTIOUS DISEASE================      ========================PROPHYLACTIC MEASURE================  DVT  GI Protonix  Graft patency   Beta blocker      Patient requires continuous monitoring with bedside rhythm monitoring, arterial line, pulse oximetry, ventilator monitoring and intermittent blood gas analysis.  Care plan discussed with ICU care team.  Patient remained critical; required more than usual post op care; I have spent 35 minutes providing non-routine post op care, revaluated multiple times during the day.         REGINALD MARK  MRN-[id number]  Patient is a 67y old  Female who presents with a chief complaint of Shortness of breath (15 Feb 2023 16:50)    HPI:  67F recently admitted to [facility] with late presentation anterior MI (Jan 2022), CAD w/ 100% pLAD, HFrEF (EF 40%) likely ICM presenting with persistent abdominal discomfort and shortness of breath at night.     After discharge from hospital for anterior wall STEMI, has had persistent abdominal discomfort and increasing dyspnea when lying flat. Dyspnea is not present when upright or with exertion but has been unable to lie flat at night. Denies peripheral edema or weight gain. Denies chest pain or diaphoresis since the initial STEMI. Was seen by outpatient cardiologist Dr. Baron and was started on Lasix however developed a diffuse rash < 1 day and therefore discontinued the medication. She presents today because she woke up in the middle of the night gasping for air with worsening abdominal discomfort.    Abdominal discomfort, she describes not as pain, but with belching, early satiety, some acidic taste in the morning. Protonix has not provided her relief although holding the brilinta one night did help. Was asked to switch brilinta to plavix for this reason by Dr. Baron.     In ED, vitals were normal, EKG was unchanged from prior, Labs were overall unremarkable, CXR with small R PLEF. Cardiology was consulted, and patient was started on metolazone for diuresis.  (11 Feb 2023 16:23)    24 hr events: admitted to CICU.    REVIEW OF SYSTEMS:    CONSTITUTIONAL: No weakness, fevers or chills  EYES/ENT: No visual changes;  No vertigo or throat pain   NECK: No pain or stiffness  RESPIRATORY: No cough, wheezing, hemoptysis; No shortness of breath  CARDIOVASCULAR: No chest pain or palpitations  GASTROINTESTINAL: No abdominal or epigastric pain. No nausea, vomiting, or hematemesis; No diarrhea or constipation. No melena or hematochezia.  GENITOURINARY: No dysuria, frequency or hematuria  NEUROLOGICAL: No numbness or weakness  SKIN: No itching, rashes      Physical Exam:  Vital Signs Last 24 Hrs  T(C): 36.5 (15 Feb 2023 19:00), Max: 36.8 (15 Feb 2023 04:10)  T(F): 97.7 (15 Feb 2023 19:00), Max: 98.3 (15 Feb 2023 04:10)  HR: 100 (15 Feb 2023 22:00) (90 - 107)  BP: 102/65 (15 Feb 2023 22:00) (85/49 - 109/63)  BP(mean): 78 (15 Feb 2023 22:00) (62 - 83)  RR: 28 (15 Feb 2023 22:00) (18 - 30)  SpO2: 96% (15 Feb 2023 22:00) (95% - 99%)    Parameters below as of 15 Feb 2023 22:00  Patient On (Oxygen Delivery Method): room air    ============================I/O===========================   I&O's Detail    14 Feb 2023 07:01  -  15 Feb 2023 07:00  --------------------------------------------------------  IN:    Oral Fluid: 962 mL  Total IN: 962 mL    OUT:  Total OUT: 0 mL    Total NET: 962 mL      15 Feb 2023 07:01  -  15 Feb 2023 23:12  --------------------------------------------------------  IN:    Oral Fluid: 600 mL  Total IN: 600 mL    OUT:    Voided (mL): 665 mL  Total OUT: 665 mL    Total NET: -65 mL        ============================ LABS =========================                        10.0   9.06  )-----------( 472      ( 15 Feb 2023 06:27 )             31.5     02-15    136  |  101  |  20  ----------------------------<  114<H>  3.6   |  24  |  0.88    Ca    9.1      15 Feb 2023 06:27  Phos  3.9     02-15  Mg     2.2     02-15              ======================Micro/Rad/Cardio=================  Culture: Reviewed   CXR: Reviewed  Echo:Reviewed  ======================================================  PAST MEDICAL & SURGICAL HISTORY:  History of acute anterior wall MI      No significant past surgical history        ====================ASSESSMENT ==============  67F with recent late presenting STEMI without prox LAD (pLAD 100%, mCx 50%, OM2 40%), HFrEF 35% now 19%, presenting with dyspnea, abdominal pain, with recent outpatient visit with Dr. Baron found to be volume overloaded, started on lasix but with allergy.  Allergy was consulted for bumex desensitization. Pt transferred to the CICU for hemodynamic monitoring while receiving bumex desensitization.     Plan:  ====================== NEUROLOGY=====================      acetaminophen     Tablet .. 650 milliGRAM(s) Oral every 6 hours PRN Temp greater or equal to 38C (100.4F), Mild Pain (1 - 3)  melatonin 3 milliGRAM(s) Oral at bedtime PRN Insomnia    ==================== RESPIRATORY======================  Mechanical Ventilation:      ====================CARDIOVASCULAR==================  EPINEPHrine     1 mG/mL Injectable 0.3 milliGRAM(s) IntraMuscular once  ethacrynic acid 50 milliGRAM(s) Oral daily  sacubitril 24 mG/valsartan 26 mG 1 Tablet(s) Oral two times a day  spironolactone 25 milliGRAM(s) Oral daily    ===================HEMATOLOGIC/ONC ===================  aspirin  chewable 81 milliGRAM(s) Oral daily  clopidogrel Tablet 75 milliGRAM(s) Oral daily    ===================== RENAL =========================  Continue monitoring urine output    ==================== GASTROINTESTINAL===================  aluminum hydroxide/magnesium hydroxide/simethicone Suspension 30 milliLiter(s) Oral every 4 hours PRN Dyspepsia  pantoprazole    Tablet 40 milliGRAM(s) Oral before breakfast    =======================    ENDOCRINE  =====================  atorvastatin 40 milliGRAM(s) Oral daily    ========================INFECTIOUS DISEASE================      ========================PROPHYLACTIC MEASURE================  DVT  GI Protonix  Graft patency   Beta blocker      Patient requires continuous monitoring with bedside rhythm monitoring, arterial line, pulse oximetry, ventilator monitoring and intermittent blood gas analysis.  Care plan discussed with ICU care team.  Patient remained critical; required more than usual post op care; I have spent 35 minutes providing non-routine post op care, revaluated multiple times during the day.         REGINALD MARK  MRN-[id number]  Patient is a 67y old  Female who presents with a chief complaint of Shortness of breath (15 Feb 2023 16:50)    HPI:  67F recently admitted to [facility] with late presentation anterior MI (Jan 2022), CAD w/ 100% pLAD, HFrEF (EF 40%) likely ICM presenting with persistent abdominal discomfort and shortness of breath at night.     After discharge from hospital for anterior wall STEMI, has had persistent abdominal discomfort and increasing dyspnea when lying flat. Dyspnea is not present when upright or with exertion but has been unable to lie flat at night. Denies peripheral edema or weight gain. Denies chest pain or diaphoresis since the initial STEMI. Was seen by outpatient cardiologist Dr. Baron and was started on Lasix however developed a diffuse rash < 1 day and therefore discontinued the medication. She presents today because she woke up in the middle of the night gasping for air with worsening abdominal discomfort.    Abdominal discomfort, she describes not as pain, but with belching, early satiety, some acidic taste in the morning. Protonix has not provided her relief although holding the brilinta one night did help. Was asked to switch brilinta to plavix for this reason by Dr. Baorn.     In ED, vitals were normal, EKG was unchanged from prior, Labs were overall unremarkable, CXR with small R PLEF. Cardiology was consulted, and patient was started on metolazone for diuresis.  (11 Feb 2023 16:23)    24 hr events: admitted to CICU.    REVIEW OF SYSTEMS:    CONSTITUTIONAL: No weakness, fevers or chills  EYES/ENT: No visual changes;  No vertigo or throat pain   NECK: No pain or stiffness  RESPIRATORY: No cough, wheezing, hemoptysis; No shortness of breath  CARDIOVASCULAR: No chest pain or palpitations  GASTROINTESTINAL: No abdominal or epigastric pain. No nausea, vomiting, or hematemesis; No diarrhea or constipation. No melena or hematochezia.  GENITOURINARY: No dysuria, frequency or hematuria  NEUROLOGICAL: No numbness or weakness  SKIN: No itching, rashes      Physical Exam:  Vital Signs Last 24 Hrs  T(C): 36.5 (15 Feb 2023 19:00), Max: 36.8 (15 Feb 2023 04:10)  T(F): 97.7 (15 Feb 2023 19:00), Max: 98.3 (15 Feb 2023 04:10)  HR: 100 (15 Feb 2023 22:00) (90 - 107)  BP: 102/65 (15 Feb 2023 22:00) (85/49 - 109/63)  BP(mean): 78 (15 Feb 2023 22:00) (62 - 83)  RR: 28 (15 Feb 2023 22:00) (18 - 30)  SpO2: 96% (15 Feb 2023 22:00) (95% - 99%)    Parameters below as of 15 Feb 2023 22:00  Patient On (Oxygen Delivery Method): room air    ============================I/O===========================   I&O's Detail    14 Feb 2023 07:01  -  15 Feb 2023 07:00  --------------------------------------------------------  IN:    Oral Fluid: 962 mL  Total IN: 962 mL    OUT:  Total OUT: 0 mL    Total NET: 962 mL      15 Feb 2023 07:01  -  15 Feb 2023 23:12  --------------------------------------------------------  IN:    Oral Fluid: 600 mL  Total IN: 600 mL    OUT:    Voided (mL): 665 mL  Total OUT: 665 mL    Total NET: -65 mL        ============================ LABS =========================                        10.0   9.06  )-----------( 472      ( 15 Feb 2023 06:27 )             31.5     02-15    136  |  101  |  20  ----------------------------<  114<H>  3.6   |  24  |  0.88    Ca    9.1      15 Feb 2023 06:27  Phos  3.9     02-15  Mg     2.2     02-15              ======================Micro/Rad/Cardio=================  Culture: Reviewed   CXR: Reviewed  Echo:Reviewed  ======================================================  PAST MEDICAL & SURGICAL HISTORY:  History of acute anterior wall MI      No significant past surgical history        ====================ASSESSMENT ==============  67F with recent late presenting STEMI without prox LAD (pLAD 100%, mCx 50%, OM2 40%), HFrEF 35% now 19%, presenting with dyspnea, abdominal pain, with recent outpatient visit with Dr. Baron found to be volume overloaded, started on lasix but with allergy.  Allergy was consulted for bumex desensitization. Pt transferred to the CICU for hemodynamic monitoring while receiving bumex desensitization.     Plan:  ====================== NEUROLOGY=====================  - A and O x 4  - No acute pain  - Ambulate as tolerated    acetaminophen     Tablet .. 650 milliGRAM(s) Oral every 6 hours PRN Temp greater or equal to 38C (100.4F), Mild Pain (1 - 3)  melatonin 3 milliGRAM(s) Oral at bedtime PRN Insomnia    ==================== RESPIRATORY======================  No active issues  - comfortable on room air    ====================CARDIOVASCULAR==================  CHF exacerbation.   -Daily weights, I/Os, Mg >2, K > 4  -C/w ethacrynic acid 50 mg QD  -Entresto 24/26  -Start spironolactone 25 qd  -off BB   - Being desensitized to bumex    CAD , nonrevascularized  - Continue ASA, plavix, lipitor. Hold BB    Allergy  - Allergic rx to lasix  - bumex desensitization per allergy team as below  - Give 10% desired Bumex dose (so 0.2 mg), monitor for 12 hours.   If no reaction, give 25% Bumex dose (0.5 mg), monitor for 12 hours.  If no reaction, give full Bumex dose (2 mg).    - allergy following    EPINEPHrine     1 mG/mL Injectable 0.3 milliGRAM(s) IntraMuscular once  ethacrynic acid 50 milliGRAM(s) Oral daily  sacubitril 24 mG/valsartan 26 mG 1 Tablet(s) Oral two times a day  spironolactone 25 milliGRAM(s) Oral daily    ===================HEMATOLOGIC/ONC ===================  No active issues  - continue to monitor H/H    aspirin  chewable 81 milliGRAM(s) Oral daily  clopidogrel Tablet 75 milliGRAM(s) Oral daily    ===================== RENAL =========================  No active issues    ==================== GASTROINTESTINAL===================  No active issues  - continue with DASH diet    aluminum hydroxide/magnesium hydroxide/simethicone Suspension 30 milliLiter(s) Oral every 4 hours PRN Dyspepsia  pantoprazole    Tablet 40 milliGRAM(s) Oral before breakfast    =======================    ENDOCRINE  =====================  No active issues  - gluc well controlled  - c/w lipitor    atorvastatin 40 milliGRAM(s) Oral daily    ========================INFECTIOUS DISEASE================  No active issues  - wbc wnl  - afebrile  - continue to monitor off abx      Patient requires continuous monitoring with bedside rhythm monitoring, pulse ox monitoring, and intermittent blood gas analysis. Care plan discussed with ICU care team. Patient remained critical and at risk for life threatening decompensation.  Patient seen, examined and plan discussed with CCU team during rounds.     I have personally provided 35 minutes of critical care time excluding time spent on separate procedures, in addition to initial critical care time provided by the CICU Attending, Dr. Marx

## 2023-02-15 NOTE — CHART NOTE - NSCHARTNOTEFT_GEN_A_CORE
HPI:  67F with recent late presenting STEMI without prox LAD (pLAD 100%, mCx 50%, OM2 40%), HFrEF 35% now 19%, presenting with dyspnea, abdominal pain, with recent outpatient visit with Dr. Baron found to be volume overloaded, started on lasix but with allergy.  Allergy was consulted for bumex desensitization. Pt transferred to the CICU for hemodynamic monitoring while receiving bumex desensitization.     Subjective:     Objective:  Vital Signs Last 24 Hrs  T(C): 36.8 (15 Feb 2023 11:17), Max: 36.8 (15 Feb 2023 04:10)  T(F): 98.3 (15 Feb 2023 11:17), Max: 98.3 (15 Feb 2023 04:10)  HR: 93 (15 Feb 2023 11:17) (90 - 102)  BP: 90/59 (15 Feb 2023 11:17) (90/59 - 96/64)  RR: 18 (15 Feb 2023 11:17) (18 - 18)  SpO2: 95% (15 Feb 2023 11:17) (95% - 96%)    Parameters below as of 15 Feb 2023 11:17  Patient On (Oxygen Delivery Method): room air      ICU Vital Signs Last 24 Hrs  T(C): 36.8 (15 Feb 2023 11:17), Max: 36.8 (15 Feb 2023 04:10)  T(F): 98.3 (15 Feb 2023 11:17), Max: 98.3 (15 Feb 2023 04:10)  HR: 93 (15 Feb 2023 11:17) (90 - 102)  BP: 90/59 (15 Feb 2023 11:17) (90/59 - 96/64)  RR: 18 (15 Feb 2023 11:17) (18 - 18)  SpO2: 95% (15 Feb 2023 11:17) (95% - 96%)    O2 Parameters below as of 15 Feb 2023 11:17  Patient On (Oxygen Delivery Method): room air    I&O's Summary    2023 07:01  -  15 Feb 2023 07:00  --------------------------------------------------------  IN: 962 mL / OUT: 0 mL / NET: 962 mL    15 Feb 2023 07:01  -  15 Feb 2023 14:27  --------------------------------------------------------  IN: 480 mL / OUT: 240 mL / NET: 240 mL    Daily     Daily Weight in k.2 (15 Feb 2023 07:33)    PHYSICAL EXAM:   General:   HEENT:  Cardiovascular:   Respiratory:   Gastrointestinal:  Genitourinary:   Integumentary:   Musculoskeletal:   Hematologic/Lymphatic:   Endocrine:   Neurologic:   Psychologic:     TELEMETRY: SR 94    EKG:     CARDIAC CATH: `< from: Cardiac Catheterization (23 @ 11:02) >    Diagnostic Findings:     Coronary Angiography   The coronary circulation is right dominant. Cardiac catheterization  was performed urgently.    LM   Left main artery: Normal. Short.      LAD   Proximal left anterior descending: The distal vessel is supplied by  faint collaterals from the Right posterior descending artery.  There is a 100 % stenosis. MARTHA Flow 0.      CX   Mid circumflex: There is a 50 % stenosis in the middle third portion  of the segment after branch to OM1. MARTHA Flow 3. Second  obtuse marginal: There is a 40 % stenosis in the proximal third  portion of the segment.    RCA   Mid right coronary artery: There is a 20 % stenosis in the middle  third portion of the segment.    Left Heart Cath   Left ventricular function was not assessed.      ECHO: < from: TTE with Doppler (w/Cont) (23 @ 12:40) >    1. Severe segmental left ventricular systolic dysfunction.  No left ventricular thrombus. Endocardial visualization  enhanced with intravenous injection of Ultrasonic Enhancing  Agent (Definity).The mid-apical anterior wall, mid to  apical anterolateral wall, all apical segments and septum  are severely hypokinetic to akinetic.  The inferior wal is  hypokinetic. 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 No thrombus  is seen.  Alcala's done on 119 and 126; EF 19%  2. Moderate diastolic dysfunction (Stage II). Increased  E/e'  is consistent with elevated left ventricular filling  pressure.  3. Normal right ventricular size and borderline RV  function. RV S' 9.57cm/s  4. Normal tricuspid valve. Mild-moderate tricuspid  regurgitation.  5. Estimated pulmonary artery systolic pressure equals 48  mm Hg, assuming right atrial pressure equals 8 mm Hg,  consistent with mild pulmonary pressures.  *** Compared with echocardiogram of 2023, The ejection  fraction had decreased, the wall motion abnormalities have  worsened and the filling pressures are now elevated.  Findings comminicated to the Bulmaro Coats MD (resident)                        10.0   9.06  )-----------( 472      ( 15 Feb 2023 06:27 )             31.5     02-15    136  |  101  |  20  ----------------------------<  114<H>  3.6   |  24  |  0.88    Ca    9.1      15 Feb 2023 06:27  Phos  3.9     02-15  Mg     2.2     02-15    67F with recent late presenting STEMI without prox LAD (pLAD 100%, mCx 50%, OM2 40%), HFrEF 35% now 19%, presenting with dyspnea, abdominal pain, with recent outpatient visit with Dr. Baron found to be volume overloaded, started on lasix but with allergy.  Allergy was consulted for bumex desensitization. Pt transferred to the CICU for hemodynamic monitoring while receiving bumex desensitization.       Heydi Moya CCU NP   #[id number] HPI:  67F with recent late presenting STEMI without prox LAD (pLAD 100%, mCx 50%, OM2 40%), HFrEF 35% now 19%, presenting with dyspnea, abdominal pain, with recent outpatient visit with Dr. Baron found to be volume overloaded, started on lasix but with allergy.  Allergy was consulted for bumex desensitization. Pt transferred to the CICU for hemodynamic monitoring while receiving bumex desensitization.     Subjective:     Objective:  Vital Signs Last 24 Hrs  T(C): 36.8 (15 Feb 2023 11:17), Max: 36.8 (15 Feb 2023 04:10)  T(F): 98.3 (15 Feb 2023 11:17), Max: 98.3 (15 Feb 2023 04:10)  HR: 93 (15 Feb 2023 11:17) (90 - 102)  BP: 90/59 (15 Feb 2023 11:17) (90/59 - 96/64)  RR: 18 (15 Feb 2023 11:17) (18 - 18)  SpO2: 95% (15 Feb 2023 11:17) (95% - 96%)    Parameters below as of 15 Feb 2023 11:17  Patient On (Oxygen Delivery Method): room air      ICU Vital Signs Last 24 Hrs  T(C): 36.8 (15 Feb 2023 11:17), Max: 36.8 (15 Feb 2023 04:10)  T(F): 98.3 (15 Feb 2023 11:17), Max: 98.3 (15 Feb 2023 04:10)  HR: 93 (15 Feb 2023 11:17) (90 - 102)  BP: 90/59 (15 Feb 2023 11:17) (90/59 - 96/64)  RR: 18 (15 Feb 2023 11:17) (18 - 18)  SpO2: 95% (15 Feb 2023 11:17) (95% - 96%)    O2 Parameters below as of 15 Feb 2023 11:17  Patient On (Oxygen Delivery Method): room air    I&O's Summary    2023 07:01  -  15 Feb 2023 07:00  --------------------------------------------------------  IN: 962 mL / OUT: 0 mL / NET: 962 mL    15 Feb 2023 07:01  -  15 Feb 2023 14:27  --------------------------------------------------------  IN: 480 mL / OUT: 240 mL / NET: 240 mL    Daily     Daily Weight in k.2 (15 Feb 2023 07:33)    PHYSICAL EXAM:   General: WAF in NAD  HEENT: atraumatic  Cardiovascular: S1, S2 WNL  Respiratory: CTA B/L   Gastrointestinal: abd soft, NT, ND  Genitourinary: voiding without complaint  Integumentary:   Musculoskeletal:   Hematologic/Lymphatic:   Endocrine:   Neurologic:   Psychologic:     TELEMETRY: SR 94    EKG:     CARDIAC CATH: `< from: Cardiac Catheterization (23 @ 11:02) >    Diagnostic Findings:     Coronary Angiography   The coronary circulation is right dominant. Cardiac catheterization  was performed urgently.    LM   Left main artery: Normal. Short.      LAD   Proximal left anterior descending: The distal vessel is supplied by  faint collaterals from the Right posterior descending artery.  There is a 100 % stenosis. MARTHA Flow 0.      CX   Mid circumflex: There is a 50 % stenosis in the middle third portion  of the segment after branch to OM1. MARTHA Flow 3. Second  obtuse marginal: There is a 40 % stenosis in the proximal third  portion of the segment.    RCA   Mid right coronary artery: There is a 20 % stenosis in the middle  third portion of the segment.    Left Heart Cath   Left ventricular function was not assessed.      ECHO: < from: TTE with Doppler (w/Cont) (23 @ 12:40) >    1. Severe segmental left ventricular systolic dysfunction.  No left ventricular thrombus. Endocardial visualization  enhanced with intravenous injection of Ultrasonic Enhancing  Agent (Definity).The mid-apical anterior wall, mid to  apical anterolateral wall, all apical segments and septum  are severely hypokinetic to akinetic.  The inferior wal is  hypokinetic. 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 No thrombus  is seen.  Alcala's done on 119 and 126; EF 19%  2. Moderate diastolic dysfunction (Stage II). Increased  E/e'  is consistent with elevated left ventricular filling  pressure.  3. Normal right ventricular size and borderline RV  function. RV S' 9.57cm/s  4. Normal tricuspid valve. Mild-moderate tricuspid  regurgitation.  5. Estimated pulmonary artery systolic pressure equals 48  mm Hg, assuming right atrial pressure equals 8 mm Hg,  consistent with mild pulmonary pressures.  *** Compared with echocardiogram of 2023, The ejection  fraction had decreased, the wall motion abnormalities have  worsened and the filling pressures are now elevated.  Findings comminicated to the Bulmaro Coats MD (resident)                        10.0   9.06  )-----------( 472      ( 15 Feb 2023 06:27 )             31.5     02-15    136  |  101  |  20  ----------------------------<  114<H>  3.6   |  24  |  0.88    Ca    9.1      15 Feb 2023 06:27  Phos  3.9     02-15  Mg     2.2     -15    67F with recent late presenting STEMI without prox LAD (pLAD 100%, mCx 50%, OM2 40%), HFrEF 35% now 19%, presenting with dyspnea, abdominal pain, with recent outpatient visit with Dr. Baron found to be volume overloaded, started on lasix but with allergy.  Allergy was consulted for bumex desensitization. Pt transferred to the CICU for hemodynamic monitoring while receiving bumex desensitization.       Heydi Moya CCU NP   #[id number] HPI:  67F with recent late presenting STEMI without prox LAD (pLAD 100%, mCx 50%, OM2 40%), HFrEF 35% now 19%, presenting with dyspnea, abdominal pain, with recent outpatient visit with Dr. Baron found to be volume overloaded, started on lasix but with allergy.  Allergy was consulted for bumex desensitization. Pt transferred to the CICU for hemodynamic monitoring while receiving bumex desensitization.     Subjective: No complaints    Objective:  Vital Signs Last 24 Hrs  T(C): 36.8 (15 Feb 2023 11:17), Max: 36.8 (15 Feb 2023 04:10)  T(F): 98.3 (15 Feb 2023 11:17), Max: 98.3 (15 Feb 2023 04:10)  HR: 93 (15 Feb 2023 11:17) (90 - 102)  BP: 90/59 (15 Feb 2023 11:17) (90/59 - 96/64)  RR: 18 (15 Feb 2023 11:17) (18 - 18)  SpO2: 95% (15 Feb 2023 11:17) (95% - 96%)    Parameters below as of 15 Feb 2023 11:17  Patient On (Oxygen Delivery Method): room air      ICU Vital Signs Last 24 Hrs  T(C): 36.8 (15 Feb 2023 11:17), Max: 36.8 (15 Feb 2023 04:10)  T(F): 98.3 (15 Feb 2023 11:17), Max: 98.3 (15 Feb 2023 04:10)  HR: 93 (15 Feb 2023 11:17) (90 - 102)  BP: 90/59 (15 Feb 2023 11:17) (90/59 - 96/64)  RR: 18 (15 Feb 2023 11:17) (18 - 18)  SpO2: 95% (15 Feb 2023 11:17) (95% - 96%)    O2 Parameters below as of 15 Feb 2023 11:17  Patient On (Oxygen Delivery Method): room air    I&O's Summary    2023 07:01  -  15 Feb 2023 07:00  --------------------------------------------------------  IN: 962 mL / OUT: 0 mL / NET: 962 mL    15 Feb 2023 07:01  -  15 Feb 2023 14:27  --------------------------------------------------------  IN: 480 mL / OUT: 240 mL / NET: 240 mL    Daily     Daily Weight in k.2 (15 Feb 2023 07:33)    PHYSICAL EXAM:   General: WAF in NAD  HEENT: atraumatic  Cardiovascular: S1, S2 WNL  Respiratory: CTA B/L   Gastrointestinal: abd soft, NT, ND  Genitourinary: voiding without complaint  Integumentary: dry and intact  Musculoskeletal: normal strength  Neurologic: A and O x 4  Psychologic: normal affect    TELEMETRY: SR 94    EKG: < from: 12 Lead ECG (23 @ 17:17) >    Diagnosis Line SINUS TACHYCARDIA WITH SHORT [place]  LEFTANTERIOR FASCICULAR BLOCK  SEPTAL INFARCT (CITED ON OR BEFORE 2023)  ST & T WAVE ABNORMALITY, CONSIDER ANTEROLATERAL ISCHEMIA    CARDIAC CATH: `< from: Cardiac Catheterization (23 @ 11:02) >    Diagnostic Findings:     Coronary Angiography   The coronary circulation is right dominant. Cardiac catheterization  was performed urgently.    LM   Left main artery: Normal. Short.      LAD   Proximal left anterior descending: The distal vessel is supplied by  faint collaterals from the Right posterior descending artery.  There is a 100 % stenosis. MARTHA Flow 0.      CX   Mid circumflex: There is a 50 % stenosis in the middle third portion  of the segment after branch to OM1. MARTHA Flow 3. Second  obtuse marginal: There is a 40 % stenosis in the proximal third  portion of the segment.    RCA   Mid right coronary artery: There is a 20 % stenosis in the middle  third portion of the segment.    Left Heart Cath   Left ventricular function was not assessed.      ECHO: < from: TTE with Doppler (w/Cont) (23 @ 12:40) >    1. Severe segmental left ventricular systolic dysfunction.  No left ventricular thrombus. Endocardial visualization  enhanced with intravenous injection of Ultrasonic Enhancing  Agent (Definity).The mid-apical anterior wall, mid to  apical anterolateral wall, all apical segments and septum  are severely hypokinetic to akinetic.  The inferior wal is  hypokinetic. 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 No thrombus  is seen.  Alcala's done on 119 and 126; EF 19%  2. Moderate diastolic dysfunction (Stage II). Increased  E/e'  is consistent with elevated left ventricular filling  pressure.  3. Normal right ventricular size and borderline RV  function. RV S' 9.57cm/s  4. Normal tricuspid valve. Mild-moderate tricuspid  regurgitation.  5. Estimated pulmonary artery systolic pressure equals 48  mm Hg, assuming right atrial pressure equals 8 mm Hg,  consistent with mild pulmonary pressures.  *** Compared with echocardiogram of 2023, The ejection  fraction had decreased, the wall motion abnormalities have  worsened and the filling pressures are now elevated.  Findings comminicated to the Bulmaro Coats MD (resident)                        10.0   9.06  )-----------( 472      ( 15 Feb 2023 06:27 )             31.5     02-15    136  |  101  |  20  ----------------------------<  114<H>  3.6   |  24  |  0.88    Ca    9.1      15 Feb 2023 06:27  Phos  3.9     02-15  Mg     2.2     02-15    67F with recent late presenting STEMI without prox LAD (pLAD 100%, mCx 50%, OM2 40%), HFrEF 35% now 19%, presenting with dyspnea, abdominal pain, with recent outpatient visit with Dr. Baron found to be volume overloaded, started on lasix but with allergy.  Allergy was consulted for bumex desensitization. Pt transferred to the CICU for hemodynamic monitoring while receiving bumex desensitization.     Neuro  - A and O x 4  - No acute pain  - Ambulate as tolerated    Resp  - CTA B/L  on room air    CV  CHF exacerbation.   -Daily weights, I/Os, Mg >2, K > 4  -C/w ethacrynic acid 50 mg QD  -Entresto   -Start spironolactone 25 qd  -off BB     CAD , nonrevascularization  - Continue ASA    Heydi Moya CCU NP   #[id number] HPI:  67F with recent late presenting STEMI without prox LAD (pLAD 100%, mCx 50%, OM2 40%), HFrEF 35% now 19%, presenting with dyspnea, abdominal pain, with recent outpatient visit with Dr. Baron found to be volume overloaded, started on lasix but with allergy.  Allergy was consulted for bumex desensitization. Pt transferred to the CICU for hemodynamic monitoring while receiving bumex desensitization.     Subjective: No complaints    Objective:  Vital Signs Last 24 Hrs  T(C): 36.8 (15 Feb 2023 11:17), Max: 36.8 (15 Feb 2023 04:10)  T(F): 98.3 (15 Feb 2023 11:17), Max: 98.3 (15 Feb 2023 04:10)  HR: 93 (15 Feb 2023 11:17) (90 - 102)  BP: 90/59 (15 Feb 2023 11:17) (90/59 - 96/64)  RR: 18 (15 Feb 2023 11:17) (18 - 18)  SpO2: 95% (15 Feb 2023 11:17) (95% - 96%)    Parameters below as of 15 Feb 2023 11:17  Patient On (Oxygen Delivery Method): room air      ICU Vital Signs Last 24 Hrs  T(C): 36.8 (15 Feb 2023 11:17), Max: 36.8 (15 Feb 2023 04:10)  T(F): 98.3 (15 Feb 2023 11:17), Max: 98.3 (15 Feb 2023 04:10)  HR: 93 (15 Feb 2023 11:17) (90 - 102)  BP: 90/59 (15 Feb 2023 11:17) (90/59 - 96/64)  RR: 18 (15 Feb 2023 11:17) (18 - 18)  SpO2: 95% (15 Feb 2023 11:17) (95% - 96%)    O2 Parameters below as of 15 Feb 2023 11:17  Patient On (Oxygen Delivery Method): room air    I&O's Summary    2023 07:01  -  15 Feb 2023 07:00  --------------------------------------------------------  IN: 962 mL / OUT: 0 mL / NET: 962 mL    15 Feb 2023 07:01  -  15 Feb 2023 14:27  --------------------------------------------------------  IN: 480 mL / OUT: 240 mL / NET: 240 mL    Daily     Daily Weight in k.2 (15 Feb 2023 07:33)    PHYSICAL EXAM:   General: WAF in NAD  HEENT: atraumatic  Cardiovascular: S1, S2 WNL  Respiratory: CTA B/L   Gastrointestinal: abd soft, NT, ND  Genitourinary: voiding without complaint  Integumentary: dry and intact  Musculoskeletal: normal strength  Neurologic: A and O x 4  Psychologic: normal affect    TELEMETRY: SR 94    EKG: < from: 12 Lead ECG (23 @ 17:17) >    Diagnosis Line SINUS TACHYCARDIA WITH SHORT OR  LEFTANTERIOR FASCICULAR BLOCK  SEPTAL INFARCT (CITED ON OR BEFORE 2023)  ST & T WAVE ABNORMALITY, CONSIDER ANTEROLATERAL ISCHEMIA    CARDIAC CATH: `< from: Cardiac Catheterization (23 @ 11:02) >    Diagnostic Findings:     Coronary Angiography   The coronary circulation is right dominant. Cardiac catheterization  was performed urgently.    LM   Left main artery: Normal. Short.      LAD   Proximal left anterior descending: The distal vessel is supplied by  faint collaterals from the Right posterior descending artery.  There is a 100 % stenosis. MARTHA Flow 0.      CX   Mid circumflex: There is a 50 % stenosis in the middle third portion  of the segment after branch to OM1. MARTHA Flow 3. Second  obtuse marginal: There is a 40 % stenosis in the proximal third  portion of the segment.    RCA   Mid right coronary artery: There is a 20 % stenosis in the middle  third portion of the segment.    Left Heart Cath   Left ventricular function was not assessed.      ECHO: < from: TTE with Doppler (w/Cont) (23 @ 12:40) >    1. Severe segmental left ventricular systolic dysfunction.  No left ventricular thrombus. Endocardial visualization  enhanced with intravenous injection of Ultrasonic Enhancing  Agent (Definity).The mid-apical anterior wall, mid to  apical anterolateral wall, all apical segments and septum  are severely hypokinetic to akinetic.  The inferior wal is  hypokinetic. 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 No thrombus  is seen.  Alcala's done on 119 and 126; EF 19%  2. Moderate diastolic dysfunction (Stage II). Increased  E/e'  is consistent with elevated left ventricular filling  pressure.  3. Normal right ventricular size and borderline RV  function. RV S' 9.57cm/s  4. Normal tricuspid valve. Mild-moderate tricuspid  regurgitation.  5. Estimated pulmonary artery systolic pressure equals 48  mm Hg, assuming right atrial pressure equals 8 mm Hg,  consistent with mild pulmonary pressures.  *** Compared with echocardiogram of 2023, The ejection  fraction had decreased, the wall motion abnormalities have  worsened and the filling pressures are now elevated.  Findings comminicated to the Bulmaro Coats MD (resident)                        10.0   9.06  )-----------( 472      ( 15 Feb 2023 06:27 )             31.5     02-15    136  |  101  |  20  ----------------------------<  114<H>  3.6   |  24  |  0.88    Ca    9.1      15 Feb 2023 06:27  Phos  3.9     02-15  Mg     2.2     02-15    67F with recent late presenting STEMI without prox LAD (pLAD 100%, mCx 50%, OM2 40%), HFrEF 35% now 19%, presenting with dyspnea, abdominal pain, with recent outpatient visit with Dr. Baron found to be volume overloaded, started on lasix but with allergy.  Allergy was consulted for bumex desensitization. Pt transferred to the CICU for hemodynamic monitoring while receiving bumex desensitization.     Neuro  - A and O x 4  - No acute pain  - Ambulate as tolerated    Resp  - CTA B/L  on room air    CV  CHF exacerbation.   -Daily weights, I/Os, Mg >2, K > 4  -C/w ethacrynic acid 50 mg QD  -Entresto   -Start spironolactone 25 qd  -off BB   - Being desensitized to bumex    Allergy  - Allergic rx to lasix  - Give 10% desired Bumex dose (so 0.2 mg), monitor for 12 hours.   If no reaction, give 25% Bumex dose (0.5 mg), monitor for 12 hours.  If no reaction, give full Bumex dose (2 mg).      CAD , nonrevascularization  - Continue ASA, plavix, lipitor. Hold BB    GI  - Continue DASH diet      - Voiding without complaint    ID  - WBC normal no fevers    Renal  - Cr and electrolytes stable    Endo  - Glucose wll controlled    DVT px  - Continue Lovenox    Heydi Moya CCU NP   #[id number]

## 2023-02-15 NOTE — PROGRESS NOTE ADULT - ATTENDING COMMENTS
Please see modifications/additions to fellow's note  67F w late presenting STEMI JAn 2023 (pLAD 100%, mCx 50%, OM2 40%) managed medically p/w sob. Pt reports she has had no cp on this admission but reports abd discomfort. She has been maintained of cardiac meds and diuresed. Repeat TTE shows further decr in LV systolic function and aneurysmal apex.     Vitals notable for borderline BP and tachycardia. On exam she is warm, mildly decr breath sounds at bases, trace edema. Labs noted. Feels overall ok   - HF consult for advanced therapies and optimization of GDMT--> holding bblocker, plan for bumex challenge in CCU per HF   - ethacrynic acid 50mg po qday for diuresis   - anemia mgmt per primary team  - asa/plavix per  - d/w  re plans for revasc as outpt once pt ready for d/c   - cont statin   - daily weights, strict I/Os.   - PPI

## 2023-02-15 NOTE — PROGRESS NOTE ADULT - ATTENDING COMMENTS
68 yo woman with recently diagnosed ICMP and stage C HFrEF. LHC found occluded LAD (seemed to be missed MI).   Was discharged home on GDMT but no diuretics, developed worsening fluid retention and was started on Lasix by outpatient cardiologist. Improved but then developed skin rash and stopped Lasix which resolved rash.   Had then worsening orthopnea, PND and LE edema. Was admitted to [facility] and treated with Ethacrinic acid with improvement.     HF consulted due to difficulty introducing GDMT limited by hypotension with dizziness.   LVEF on discharge was 40% now it is <20% with remodeled LV and decreased RV function     Exam shows warm extremities with 1-2+ edema, narrow pulse pressure and tachycardia   Labs show normal sodium, Cr and LFTs    - Hold BB. Would restart at low dose once euvolemic.  - Entresto 24-26 mg bid   - Spironolactone 25 mg daily   - Appreciate recs from allergy to re-challenge with loop diuretics given that they will be a cornerstone of her therapy at home and reaction was not serious.   - Would start with Bumex 2 mg daily   - We will follow with you     Dustin Cabrera MD .

## 2023-02-15 NOTE — PROGRESS NOTE ADULT - SUBJECTIVE AND OBJECTIVE BOX
INCOMPLETE NOTE    Bulmaro Coats | PGY1| Pager: [phone]  Interval Events:    REVIEW OF SYSTEMS:  CONSTITUTIONAL: No weakness, fevers or chills  EYES/ENT: No visual changes;  No vertigo or throat pain   NECK: No pain or stiffness  RESPIRATORY: No cough, wheezing, hemoptysis; No shortness of breath  CARDIOVASCULAR: No chest pain or palpitations  GASTROINTESTINAL: No abdominal or epigastric pain. No nausea, vomiting, or hematemesis; No diarrhea or constipation. No melena or hematochezia.  GENITOURINARY: No dysuria, frequency or hematuria  NEUROLOGICAL: No numbness or weakness  SKIN: No itching, burning, rashes, or lesions   All other review of systems is negative unless indicated above.    OBJECTIVE:  ICU Vital Signs Last 24 Hrs  T(C): 36.8 (15 Feb 2023 04:10), Max: 36.8 (15 Feb 2023 04:10)  T(F): 98.3 (15 Feb 2023 04:10), Max: 98.3 (15 Feb 2023 04:10)  HR: 90 (15 Feb 2023 05:27) (90 - 109)  BP: 96/64 (15 Feb 2023 05:27) (92/58 - 109/78)  BP(mean): --  ABP: --  ABP(mean): --  RR: 18 (15 Feb 2023 04:10) (18 - 18)  SpO2: 96% (15 Feb 2023 04:10) (96% - 98%)    O2 Parameters below as of 15 Feb 2023 04:10  Patient On (Oxygen Delivery Method): room air              02-13 @ 07:01 - 02-14 @ 07:00  --------------------------------------------------------  IN: 630 mL / OUT: 400 mL / NET: 230 mL    02-14 @ 07:01 - 02-15 @ 06:25  --------------------------------------------------------  IN: 962 mL / OUT: 0 mL / NET: 962 mL      CAPILLARY BLOOD GLUCOSE          PHYSICAL EXAM:  General: WN/WD NAD  Neurology: A&Ox3, nonfocal, MONREAL x 4  Eyes: PERRLA/ EOMI, Gross vision intact  ENT/Neck: Neck supple, trachea midline, No JVD, Gross hearing intact  Respiratory: CTA B/L, No wheezing, rales, rhonchi  CV: RRR, +S1/S2, -S3/S4, no murmurs, rubs or gallops  Abdominal: Soft, NT, ND +BS, No HSM  MSK: 5/5 strength UE/LE bilaterally  Extremities: No edema, 2+ peripheral pulses  Skin: No Rashes, Hematoma, Ecchymosis  Incisions:   Tubes:    HOSPITAL MEDICATIONS:  MEDICATIONS  (STANDING):  aspirin  chewable 81 milliGRAM(s) Oral daily  atorvastatin 40 milliGRAM(s) Oral daily  chlorhexidine 2% Cloths 1 Application(s) Topical <User Schedule>  clopidogrel Tablet 75 milliGRAM(s) Oral daily  ethacrynic acid 50 milliGRAM(s) Oral daily  pantoprazole    Tablet 40 milliGRAM(s) Oral before breakfast  sacubitril 24 mG/valsartan 26 mG 1 Tablet(s) Oral two times a day  spironolactone 25 milliGRAM(s) Oral daily    MEDICATIONS  (PRN):  acetaminophen     Tablet .. 650 milliGRAM(s) Oral every 6 hours PRN Temp greater or equal to 38C (100.4F), Mild Pain (1 - 3)  aluminum hydroxide/magnesium hydroxide/simethicone Suspension 30 milliLiter(s) Oral every 4 hours PRN Dyspepsia  melatonin 3 milliGRAM(s) Oral at bedtime PRN Insomnia      LABS:                        10.4   8.02  )-----------( 507      ( 14 Feb 2023 06:46 )             33.3     Hgb Trend: 10.4<--, 9.9<--, 11.2<--, 11.5<--  02-14    138  |  99  |  17  ----------------------------<  117<H>  3.2<L>   |  28  |  0.80    Ca    9.2      14 Feb 2023 06:46  Phos  3.9     02-14  Mg     2.2     02-14      Creatinine Trend: 0.80<--, 0.86<--, 1.10<--, 0.82<--, 0.77<--, 0.73<--            MICROBIOLOGY:          Bulmaro Coats | PGY1| Pager: [phone]  Interval Events: NAEON; patient reports decreasing orthopnea; however still present. patient endorses increased urination; weight remains the same. Patient reports stomach is still sensitive.    REVIEW OF SYSTEMS:  All other review of systems is negative unless indicated above.    OBJECTIVE:  ICU Vital Signs Last 24 Hrs  T(C): 36.8 (15 Feb 2023 04:10), Max: 36.8 (15 Feb 2023 04:10)  T(F): 98.3 (15 Feb 2023 04:10), Max: 98.3 (15 Feb 2023 04:10)  HR: 90 (15 Feb 2023 05:27) (90 - 109)  BP: 96/64 (15 Feb 2023 05:27) (92/58 - 109/78)  BP(mean): --  ABP: --  ABP(mean): --  RR: 18 (15 Feb 2023 04:10) (18 - 18)  SpO2: 96% (15 Feb 2023 04:10) (96% - 98%)    O2 Parameters below as of 15 Feb 2023 04:10  Patient On (Oxygen Delivery Method): room air              02-13 @ 07:01  -  02-14 @ 07:00  --------------------------------------------------------  IN: 630 mL / OUT: 400 mL / NET: 230 mL    02-14 @ 07:01 - 02-15 @ 06:25  --------------------------------------------------------  IN: 962 mL / OUT: 0 mL / NET: 962 mL      CAPILLARY BLOOD GLUCOSE          PHYSICAL EXAM:  General: WN/WD NAD  Neurology: A&Ox3, nonfocal, MONREAL x 4  ENT/Neck: Unclear JVD;   Respiratory: fine bibasilar crackles  CV: RRR, +S1/S2, -S3/S4, no murmurs, rubs or gallops  Abdominal: Soft, NT, ND +BS, No HSM  Extremities: No edema, 2+ peripheral pulses  Skin: No Rashes, Hematoma, Ecchymosis    HOSPITAL MEDICATIONS:  MEDICATIONS  (STANDING):  aspirin  chewable 81 milliGRAM(s) Oral daily  atorvastatin 40 milliGRAM(s) Oral daily  chlorhexidine 2% Cloths 1 Application(s) Topical <User Schedule>  clopidogrel Tablet 75 milliGRAM(s) Oral daily  ethacrynic acid 50 milliGRAM(s) Oral daily  pantoprazole    Tablet 40 milliGRAM(s) Oral before breakfast  sacubitril 24 mG/valsartan 26 mG 1 Tablet(s) Oral two times a day  spironolactone 25 milliGRAM(s) Oral daily    MEDICATIONS  (PRN):  acetaminophen     Tablet .. 650 milliGRAM(s) Oral every 6 hours PRN Temp greater or equal to 38C (100.4F), Mild Pain (1 - 3)  aluminum hydroxide/magnesium hydroxide/simethicone Suspension 30 milliLiter(s) Oral every 4 hours PRN Dyspepsia  melatonin 3 milliGRAM(s) Oral at bedtime PRN Insomnia      LABS:                        10.4   8.02  )-----------( 507      ( 14 Feb 2023 06:46 )             33.3     Hgb Trend: 10.4<--, 9.9<--, 11.2<--, 11.5<--  02-14    138  |  99  |  17  ----------------------------<  117<H>  3.2<L>   |  28  |  0.80    Ca    9.2      14 Feb 2023 06:46  Phos  3.9     02-14  Mg     2.2     02-14      Creatinine Trend: 0.80<--, 0.86<--, 1.10<--, 0.82<--, 0.77<--, 0.73<--            MICROBIOLOGY:

## 2023-02-15 NOTE — PROGRESS NOTE ADULT - ASSESSMENT
67F recently admitted to [facility] with late presentation anterior MI (Jan 2022), CAD w/ 100% pLAD, HFrEF (EF 40%) likely ICM presenting with persistent abdominal discomfort and orthopnea is found to be in acute on chronic systolic heart failure with new EF 19% on TTE, likely in the setting of ischemic cardiomyopathy, on ethacrynic acid, spironolactone given recent possibly sulfa reaction and entresto, has mild improvement in orthopnea; pending further recs from Heart Failure. Allergy consulted today for evaluation of Lasix reaction. 67F recently admitted to [facility] with late presentation anterior MI (Jan 2022), CAD w/ 100% pLAD, HFrEF (EF 40%) likely ICM presenting with persistent abdominal discomfort and orthopnea is found to be in acute on chronic systolic heart failure with new EF 19% on TTE, likely in the setting of ischemic cardiomyopathy, on ethacrynic acid, spironolactone given recent possibly sulfa reaction and entresto, has mild improvement in orthopnea; with plan to desensitive with Bumex 2g IV following Allergy protocol with transfer to CICU

## 2023-02-15 NOTE — PROGRESS NOTE ADULT - ATTENDING COMMENTS
Patient seen and examined at bedside. No acute events overnight. Patient feels slightly better. Weight remains unchanged at 78.2 kg. Will need to touch base with cardiology/HF if we can further optimize. Patient apprehensive of Lasix challenge. Would consider another diuretic if HF thinks it's worthwhile. I told patient best challenged while here.

## 2023-02-15 NOTE — CHART NOTE - NSCHARTNOTEFT_GEN_A_CORE
Spoke with Dr. Christie about plan to trial pt on Bumex. She should be in an ICU setting, off of beta blocker for 24 hours, with IM epinephrine readily available.   Challenge will follow the same format as previously mentioned, but can give doses after 12 hours of monitoring rather than 24 hours. See below:    Give 10% desired Bumex dose (so 0.2 mg), monitor for 12 hours.   If no reaction, give 25% Bumex dose (0.5 mg), monitor for 12 hours.  If no reaction, give full Bumex dose (2 mg).      Same emergency protocol as previously mentioned.     Please reach out if any questions or concerns.

## 2023-02-15 NOTE — PROGRESS NOTE ADULT - PROBLEM SELECTOR PLAN 1
-Daily weights, I/Os, Mg >2, K > 4  -C/w ethacrynic acid 50 mg QD  -Entresto 24/26  -spironolactone 25 qd

## 2023-02-16 LAB
ALBUMIN SERPL ELPH-MCNC: 3.3 G/DL — SIGNIFICANT CHANGE UP (ref 3.3–5)
ALP SERPL-CCNC: 120 U/L — SIGNIFICANT CHANGE UP (ref 40–120)
ALT FLD-CCNC: 9 U/L — LOW (ref 10–45)
ANION GAP SERPL CALC-SCNC: 12 MMOL/L — SIGNIFICANT CHANGE UP (ref 5–17)
AST SERPL-CCNC: 11 U/L — SIGNIFICANT CHANGE UP (ref 10–40)
BILIRUB SERPL-MCNC: 0.4 MG/DL — SIGNIFICANT CHANGE UP (ref 0.2–1.2)
BUN SERPL-MCNC: 23 MG/DL — SIGNIFICANT CHANGE UP (ref 7–23)
CALCIUM SERPL-MCNC: 9.1 MG/DL — SIGNIFICANT CHANGE UP (ref 8.4–10.5)
CHLORIDE SERPL-SCNC: 102 MMOL/L — SIGNIFICANT CHANGE UP (ref 96–108)
CO2 SERPL-SCNC: 24 MMOL/L — SIGNIFICANT CHANGE UP (ref 22–31)
CREAT SERPL-MCNC: 0.89 MG/DL — SIGNIFICANT CHANGE UP (ref 0.5–1.3)
EGFR: 71 ML/MIN/1.73M2 — SIGNIFICANT CHANGE UP
GLUCOSE SERPL-MCNC: 125 MG/DL — HIGH (ref 70–99)
HCT VFR BLD CALC: 32.5 % — LOW (ref 34.5–45)
HGB BLD-MCNC: 10.1 G/DL — LOW (ref 11.5–15.5)
MAGNESIUM SERPL-MCNC: 2.2 MG/DL — SIGNIFICANT CHANGE UP (ref 1.6–2.6)
MCHC RBC-ENTMCNC: 27.9 PG — SIGNIFICANT CHANGE UP (ref 27–34)
MCHC RBC-ENTMCNC: 31.1 GM/DL — LOW (ref 32–36)
MCV RBC AUTO: 89.8 FL — SIGNIFICANT CHANGE UP (ref 80–100)
NRBC # BLD: 0 /100 WBCS — SIGNIFICANT CHANGE UP (ref 0–0)
PHOSPHATE SERPL-MCNC: 3.9 MG/DL — SIGNIFICANT CHANGE UP (ref 2.5–4.5)
PLATELET # BLD AUTO: 476 K/UL — HIGH (ref 150–400)
POTASSIUM SERPL-MCNC: 3.7 MMOL/L — SIGNIFICANT CHANGE UP (ref 3.5–5.3)
POTASSIUM SERPL-SCNC: 3.7 MMOL/L — SIGNIFICANT CHANGE UP (ref 3.5–5.3)
PROT SERPL-MCNC: 7.3 G/DL — SIGNIFICANT CHANGE UP (ref 6–8.3)
RBC # BLD: 3.62 M/UL — LOW (ref 3.8–5.2)
RBC # FLD: 14.9 % — HIGH (ref 10.3–14.5)
SODIUM SERPL-SCNC: 138 MMOL/L — SIGNIFICANT CHANGE UP (ref 135–145)
WBC # BLD: 9.73 K/UL — SIGNIFICANT CHANGE UP (ref 3.8–10.5)
WBC # FLD AUTO: 9.73 K/UL — SIGNIFICANT CHANGE UP (ref 3.8–10.5)

## 2023-02-16 PROCEDURE — 99292 CRITICAL CARE ADDL 30 MIN: CPT | Mod: GC

## 2023-02-16 PROCEDURE — 99291 CRITICAL CARE FIRST HOUR: CPT | Mod: GC

## 2023-02-16 PROCEDURE — 99292 CRITICAL CARE ADDL 30 MIN: CPT

## 2023-02-16 PROCEDURE — 99291 CRITICAL CARE FIRST HOUR: CPT

## 2023-02-16 RX ORDER — BUMETANIDE 0.25 MG/ML
0.5 INJECTION INTRAMUSCULAR; INTRAVENOUS ONCE
Refills: 0 | Status: COMPLETED | OUTPATIENT
Start: 2023-02-16 | End: 2023-02-16

## 2023-02-16 RX ORDER — BUMETANIDE 0.25 MG/ML
2 INJECTION INTRAMUSCULAR; INTRAVENOUS ONCE
Refills: 0 | Status: COMPLETED | OUTPATIENT
Start: 2023-02-16 | End: 2023-02-16

## 2023-02-16 RX ORDER — BUMETANIDE 0.25 MG/ML
2 INJECTION INTRAMUSCULAR; INTRAVENOUS ONCE
Refills: 0 | Status: DISCONTINUED | OUTPATIENT
Start: 2023-02-16 | End: 2023-02-16

## 2023-02-16 RX ORDER — POTASSIUM CHLORIDE 20 MEQ
40 PACKET (EA) ORAL ONCE
Refills: 0 | Status: COMPLETED | OUTPATIENT
Start: 2023-02-16 | End: 2023-02-16

## 2023-02-16 RX ORDER — BUMETANIDE 0.25 MG/ML
0.5 INJECTION INTRAMUSCULAR; INTRAVENOUS ONCE
Refills: 0 | Status: DISCONTINUED | OUTPATIENT
Start: 2023-02-16 | End: 2023-02-16

## 2023-02-16 RX ORDER — LOSARTAN POTASSIUM 100 MG/1
12.5 TABLET, FILM COATED ORAL DAILY
Refills: 0 | Status: DISCONTINUED | OUTPATIENT
Start: 2023-02-16 | End: 2023-02-17

## 2023-02-16 RX ORDER — POTASSIUM CHLORIDE 20 MEQ
40 PACKET (EA) ORAL ONCE
Refills: 0 | Status: DISCONTINUED | OUTPATIENT
Start: 2023-02-16 | End: 2023-02-16

## 2023-02-16 RX ORDER — IPRATROPIUM/ALBUTEROL SULFATE 18-103MCG
3 AEROSOL WITH ADAPTER (GRAM) INHALATION ONCE
Refills: 0 | Status: DISCONTINUED | OUTPATIENT
Start: 2023-02-16 | End: 2023-02-17

## 2023-02-16 RX ORDER — DIPHENHYDRAMINE HCL 50 MG
50 CAPSULE ORAL ONCE
Refills: 0 | Status: DISCONTINUED | OUTPATIENT
Start: 2023-02-16 | End: 2023-02-17

## 2023-02-16 RX ADMIN — CLOPIDOGREL BISULFATE 75 MILLIGRAM(S): 75 TABLET, FILM COATED ORAL at 10:02

## 2023-02-16 RX ADMIN — Medication 30 MILLILITER(S): at 04:44

## 2023-02-16 RX ADMIN — BUMETANIDE 0.5 MILLIGRAM(S): 0.25 INJECTION INTRAMUSCULAR; INTRAVENOUS at 04:01

## 2023-02-16 RX ADMIN — Medication 30 MILLILITER(S): at 21:28

## 2023-02-16 RX ADMIN — SACUBITRIL AND VALSARTAN 1 TABLET(S): 24; 26 TABLET, FILM COATED ORAL at 05:07

## 2023-02-16 RX ADMIN — CHLORHEXIDINE GLUCONATE 1 APPLICATION(S): 213 SOLUTION TOPICAL at 05:08

## 2023-02-16 RX ADMIN — Medication 81 MILLIGRAM(S): at 10:03

## 2023-02-16 RX ADMIN — Medication 40 MILLIEQUIVALENT(S): at 09:58

## 2023-02-16 RX ADMIN — BUMETANIDE 2 MILLIGRAM(S): 0.25 INJECTION INTRAMUSCULAR; INTRAVENOUS at 16:04

## 2023-02-16 RX ADMIN — SPIRONOLACTONE 25 MILLIGRAM(S): 25 TABLET, FILM COATED ORAL at 05:08

## 2023-02-16 RX ADMIN — ETHACRYNIC ACID 50 MILLIGRAM(S): 25 TABLET ORAL at 06:03

## 2023-02-16 RX ADMIN — ATORVASTATIN CALCIUM 40 MILLIGRAM(S): 80 TABLET, FILM COATED ORAL at 17:23

## 2023-02-16 NOTE — PROGRESS NOTE ADULT - ATTENDING COMMENTS
Tolerating bumex desensization w/o hives. On exam JVP elevated, RRR, no m/r/g, CTAB, nontender abdomen, no pedal edema. BP noted to be in high 70/low 80s. Labs reviewed. TTE reviewed  - c/w current meds  - change Entresto to losartan 12.5 mg as may not tolerate Entresto   - c/w ASA/plavix   - c/w dulce 25 mg daily

## 2023-02-16 NOTE — PROGRESS NOTE ADULT - CRITICAL CARE ATTENDING COMMENT
68 yo woman with recently diagnosed ICMP and stage C HFrEF. LHC found occluded LAD (seemed to be missed MI).   Was discharged home on GDMT but no diuretics, developed worsening fluid retention and was started on Lasix by outpatient cardiologist. Improved but then developed skin rash and stopped Lasix which resolved rash.     AAO x 3  Had then worsening orthopnea, PND and LE edema. Was admitted to [facility] and treated with Ethacrinic acid with improvement. Transferred to CICU for bumex desensitization   LVEF on discharge was 40% now it is <20% with remodeled LV and decreased RV function   Exam shows warm extremities with 1-2+ edema, narrow pulse pressure and tachycardia   - BB is held. Would restart at low dose once euvolemic.  - Entresto 24-26 mg bid is not well tolerated by the pt - will hold and start low dose losartan tomorrow  - Spironolactone 25 mg daily   - monitor urine output  - keep negative  - keep K> 4, Mg > 2    Patient is critically ill, requiring critical care services. Despite the current condition, the patient is at a high risk of clinical and hemodynamic demise

## 2023-02-16 NOTE — PROGRESS NOTE ADULT - ASSESSMENT
67F with recent late presenting STEMI without prox LAD (pLAD 100%, mCx 50%, OM2 40%), HFrEF 35% now 19%, presenting with dyspnea, abdominal pain, with recent outpatient visit with Dr. Baron found to be volume overloaded, started on lasix but with allergy.  Allergy was consulted for bumex desensitization. Pt transferred to the CICU for hemodynamic monitoring while receiving bumex desensitization.     ====================== NEUROLOGY=====================  A and O x 4  - continue to monitor mental status per protocol   - Ambulate as tolerated    ==================== RESPIRATORY======================  comfortable on room air  - Monitor SpO2 with goal >94%     ====================CARDIOVASCULAR==================  CHF exacerbation.   - continue Losartan 12.5 mg  - Cont dulce 25 mg   - finishing bumex desensitization today  - BB has been held 2/2 bumex desensitization   -Daily weights, I/Os, Mg >2, K > 4    CAD , non-revascularized  - Continue ASA, plavix, lipitor.   -  GDMT as above     Allergy  - Allergic rx to lasix  - bumex desensitization per allergy team as below  - received full dose of bumex at 4 PM   - allergy following      ===================HEMATOLOGIC/ONC ===================  Microcytic Anemia, stable   - continue to monitor H/H  - Patient refusing DVT PPX. Continue SCDs    ===================== RENAL =========================  Scr wnl  - continue to monitor SCr, BUN, and lytes  - Monitor I&Os    ==================== GASTROINTESTINAL===================  - continue with DASH diet  - monitor for reg BM while inpatient      =======================    ENDOCRINE  =====================  No active issues  - gluc well controlled. Monitor BG with goal 100-180     ========================INFECTIOUS DISEASE================  Afebrile, no leukocytosis    - monitor wbc and temp curve   - continue to monitor off abx        I, Devora Wiggins, have personally provided 35 minutes of critical care time excluding time spent on separate procedures, in addition to initial critical care time provided by the CICU Attending, Dr. Marx.

## 2023-02-16 NOTE — PROGRESS NOTE ADULT - SUBJECTIVE AND OBJECTIVE BOX
REGINALD MARK  MRN-[id number]  Patient is a 67y old  Female who presents with a chief complaint of Shortness of breath (15 Feb 2023 23:11)    HPI:  67F recently admitted to [facility] with late presentation anterior MI (Jan 2022), CAD w/ 100% pLAD, HFrEF (EF 40%) likely ICM presenting with persistent abdominal discomfort and shortness of breath at night.     After discharge from hospital for anterior wall STEMI, has had persistent abdominal discomfort and increasing dyspnea when lying flat. Dyspnea is not present when upright or with exertion but has been unable to lie flat at night. Denies peripheral edema or weight gain. Denies chest pain or diaphoresis since the initial STEMI. Was seen by outpatient cardiologist Dr. Baron and was started on Lasix however developed a diffuse rash < 1 day and therefore discontinued the medication. She presents today because she woke up in the middle of the night gasping for air with worsening abdominal discomfort.    Abdominal discomfort, she describes not as pain, but with belching, early satiety, some acidic taste in the morning. Protonix has not provided her relief although holding the brilinta one night did help. Was asked to switch brilinta to plavix for this reason by Dr. Baron.     In ED, vitals were normal, EKG was unchanged from prior, Labs were overall unremarkable, CXR with small R PLEF. Cardiology was consulted, and patient was started on metolazone for diuresis.  (11 Feb 2023 16:23)      24hr Interval History:   received 2 doses of Bumex per the allergy protocol without reaction.     Physical Exam:  Vital Signs Last 24 Hrs  T(C): 36.9 (16 Feb 2023 04:00), Max: 36.9 (16 Feb 2023 04:00)  T(F): 98.4 (16 Feb 2023 04:00), Max: 98.4 (16 Feb 2023 04:00)  HR: 89 (16 Feb 2023 06:00) (89 - 108)  BP: 93/57 (16 Feb 2023 06:00) (85/49 - 109/63)  BP(mean): 68 (16 Feb 2023 06:00) (62 - 83)  RR: 17 (16 Feb 2023 06:00) (17 - 30)  SpO2: 96% (16 Feb 2023 06:00) (94% - 99%)    Parameters below as of 16 Feb 2023 06:00  Patient On (Oxygen Delivery Method): room air        PHYSICAL EXAM:  Constitutional: Patient laying in bed, NAD  Head: Atraumatic, normocephalic  Eyes: No scleral icterus. PERRLA. EOMI  ENMT: Moist mucous membrane. Uvula midline  Neck: Supple, No JVD  Respiratory: CTA B/L. No wheezes, rales or rhonchi   Cardiovascular: S1/S2. No murmurs, rubs, or gallops.  Gastrointestinal: Nondistended, BSx4, soft, nontender.  Extremities: Moves all extremities. Warm, no edema, nontender  Vascular: 2+ DP/PT pulses B/L   Neurological: A&Ox3. Follows commands. No focal deficits.   Skin: No rashes on exposed skin     ============================I/O===========================   I&O's Detail    15 Feb 2023 07:01  -  16 Feb 2023 07:00  --------------------------------------------------------  IN:    Oral Fluid: 660 mL  Total IN: 660 mL    OUT:    Voided (mL): 1290 mL  Total OUT: 1290 mL    Total NET: -630 mL        ============================ LABS =========================                        10.1   9.73  )-----------( 476      ( 16 Feb 2023 04:31 )             32.5     02-16    138  |  102  |  23  ----------------------------<  125<H>  3.7   |  24  |  0.89    Ca    9.1      16 Feb 2023 04:31  Phos  3.9     02-16  Mg     2.2     02-16    TPro  7.3  /  Alb  3.3  /  TBili  0.4  /  DBili  x   /  AST  11  /  ALT  9<L>  /  AlkPhos  120  02-16    LIVER FUNCTIONS - ( 16 Feb 2023 04:31 )  Alb: 3.3 g/dL / Pro: 7.3 g/dL / ALK PHOS: 120 U/L / ALT: 9 U/L / AST: 11 U/L / GGT: x                 ======================Micro/Rad/Cardio=================  Culture: Reviewed   CXR: Reviewed  Echo:Reviewed  ======================================================  PAST MEDICAL & SURGICAL HISTORY:  History of acute anterior wall MI      No significant past surgical history        ====================ASSESSMENT ==============  67F with recent late presenting STEMI without prox LAD (pLAD 100%, mCx 50%, OM2 40%), HFrEF 35% now 19%, presenting with dyspnea, abdominal pain, with recent outpatient visit with Dr. Baron found to be volume overloaded, started on lasix but with allergy.  Allergy was consulted for bumex desensitization. Pt transferred to the CICU for hemodynamic monitoring while receiving bumex desensitization.     Plan:  ====================== NEUROLOGY=====================  A and O x 4  - continue to monitor mental status per protocol   - Ambulate as tolerated    acetaminophen     Tablet .. 650 milliGRAM(s) Oral every 6 hours PRN Temp greater or equal to 38C (100.4F), Mild Pain (1 - 3)  melatonin 3 milliGRAM(s) Oral at bedtime PRN Insomnia    ==================== RESPIRATORY======================  comfortable on room air  - Monitor SpO2 with goal >94%     ====================CARDIOVASCULAR==================  CHF exacerbation.   -Daily weights, I/Os, Mg >2, K > 4  -C/w ethacrynic acid 50 mg QD  - c/w Entresto 24/26  -Start spironolactone 25 qd  -off BB   - Being desensitized to bumex, follow protocol per allergy. Thus far no side effects. Pending full dose bumex at 4pm     CAD , nonrevascularized  - Continue ASA, plavix, lipitor.   - hold BB for now in the setting of ADHF  -  c/w Entresto 24/26    Allergy  - Allergic rx to lasix  - bumex desensitization per allergy team as below  - Give 10% desired Bumex dose (so 0.2 mg), monitor for 12 hours.   If no reaction, give 25% Bumex dose (0.5 mg), monitor for 12 hours.  If no reaction, give full Bumex dose (2 mg).    - allergy following    EPINEPHrine     1 mG/mL Injectable 0.3 milliGRAM(s) IntraMuscular once  ethacrynic acid 50 milliGRAM(s) Oral daily  sacubitril 24 mG/valsartan 26 mG 1 Tablet(s) Oral two times a day  spironolactone 25 milliGRAM(s) Oral daily    ===================HEMATOLOGIC/ONC ===================  Microcytic Anemia, stable   - continue to monitor H/H  - Patient refusing DVT PPX. Will order SCDs    aspirin  chewable 81 milliGRAM(s) Oral daily  clopidogrel Tablet 75 milliGRAM(s) Oral daily    ===================== RENAL =========================  Scr wnl  - continue to monitor SCr, BUN, and lytes  - Monitor I&Os    ==================== GASTROINTESTINAL===================  - continue with DASH diet  - monitor for reg BM while inpatient      aluminum hydroxide/magnesium hydroxide/simethicone Suspension 30 milliLiter(s) Oral every 4 hours PRN Dyspepsia  pantoprazole    Tablet 40 milliGRAM(s) Oral before breakfast    =======================    ENDOCRINE  =====================  No active issues  - gluc well controlled. Monitor BG with goal 100-180   - c/w lipitor    atorvastatin 40 milliGRAM(s) Oral daily    ========================INFECTIOUS DISEASE================  Afebrile, no leukocytosis    - monitor wbc and temp curve   - continue to monitor off abx        Patient requires continuous monitoring with bedside rhythm monitoring, arterial line, pulse oximetry, ventilator monitoring and intermittent blood gas analysis.  Care plan discussed with ICU care team.  Patient remained critical; required more than usual post op care; I have spent 35 minutes providing non-routine post op care, in addition to initial critical time provided by CICU attending Dr. Marx, re-evaluated multiple times during the day.

## 2023-02-16 NOTE — PROGRESS NOTE ADULT - SUBJECTIVE AND OBJECTIVE BOX
REGINALD MARK  MRN-[id number]  Patient is a 67y old  Female who presents with a chief complaint of Shortness of breath (16 Feb 2023 08:21)    HPI:  67F recently admitted to [facility] with late presentation anterior MI (Jan 2022), CAD w/ 100% pLAD, HFrEF (EF 40%) likely ICM presenting with persistent abdominal discomfort and shortness of breath at night.     After discharge from hospital for anterior wall STEMI, has had persistent abdominal discomfort and increasing dyspnea when lying flat. Dyspnea is not present when upright or with exertion but has been unable to lie flat at night. Denies peripheral edema or weight gain. Denies chest pain or diaphoresis since the initial STEMI. Was seen by outpatient cardiologist Dr. Baron and was started on Lasix however developed a diffuse rash < 1 day and therefore discontinued the medication. She presents today because she woke up in the middle of the night gasping for air with worsening abdominal discomfort.    Abdominal discomfort, she describes not as pain, but with belching, early satiety, some acidic taste in the morning. Protonix has not provided her relief although holding the brilinta one night did help. Was asked to switch brilinta to plavix for this reason by Dr. Baron.     In ED, vitals were normal, EKG was unchanged from prior, Labs were overall unremarkable, CXR with small R PLEF. Cardiology was consulted, and patient was started on metolazone for diuresis.  (11 Feb 2023 16:23)      Overnight events: No acute events overnight.     REVIEW OF SYSTEMS:    CONSTITUTIONAL: No weakness, fevers or chills  EYES/ENT: No visual changes;  No vertigo or throat pain   NECK: No pain or stiffness  RESPIRATORY: No cough, wheezing, hemoptysis; No shortness of breath  CARDIOVASCULAR: No chest pain or palpitations  GASTROINTESTINAL: No abdominal or epigastric pain. No nausea, vomiting, or hematemesis; No diarrhea or constipation. No melena or hematochezia.  GENITOURINARY: No dysuria, frequency or hematuria  NEUROLOGICAL: No numbness or weakness  SKIN: No itching, rashes    Vital Signs Last 24 Hrs  T(C): 36.6 (16 Feb 2023 16:45), Max: 37.1 (16 Feb 2023 11:00)  T(F): 97.9 (16 Feb 2023 16:45), Max: 98.7 (16 Feb 2023 11:00)  HR: 92 (16 Feb 2023 20:00) (89 - 111)  BP: 91/54 (16 Feb 2023 20:00) (78/50 - 106/61)  BP(mean): 67 (16 Feb 2023 20:00) (60 - 82)  RR: 18 (16 Feb 2023 20:00) (17 - 30)  SpO2: 96% (16 Feb 2023 20:00) (94% - 100%)    Parameters below as of 16 Feb 2023 19:00  Patient On (Oxygen Delivery Method): room air        ============================I/O===========================   I&O's Detail    15 Feb 2023 07:01  -  16 Feb 2023 07:00  --------------------------------------------------------  IN:    Oral Fluid: 660 mL  Total IN: 660 mL    OUT:    Voided (mL): 1290 mL  Total OUT: 1290 mL    Total NET: -630 mL      16 Feb 2023 07:01  -  16 Feb 2023 20:37  --------------------------------------------------------  IN:    Oral Fluid: 408 mL  Total IN: 408 mL    OUT:    Voided (mL): 2100 mL  Total OUT: 2100 mL    Total NET: -1692 mL        ============================ LABS =========================                        10.1   9.73  )-----------( 476      ( 16 Feb 2023 04:31 )             32.5     02-16    138  |  102  |  23  ----------------------------<  125<H>  3.7   |  24  |  0.89    Ca    9.1      16 Feb 2023 04:31  Phos  3.9     02-16  Mg     2.2     02-16    TPro  7.3  /  Alb  3.3  /  TBili  0.4  /  DBili  x   /  AST  11  /  ALT  9<L>  /  AlkPhos  120  02-16    LIVER FUNCTIONS - ( 16 Feb 2023 04:31 )  Alb: 3.3 g/dL / Pro: 7.3 g/dL / ALK PHOS: 120 U/L / ALT: 9 U/L / AST: 11 U/L / GGT: x                 ======================Micro/Rad/Cardio=================  Culture: Reviewed   CXR: Reviewed  Echo:Reviewed  ======================================================  PAST MEDICAL & SURGICAL HISTORY:  History of acute anterior wall MI      No significant past surgical history

## 2023-02-16 NOTE — PROGRESS NOTE ADULT - PROBLEM SELECTOR PLAN 1
-Daily weights, I/Os, Mg >2, K > 4  -stop ethacrynic acid, c/w bumex   -Entresto 24/26  -spironolactone 25 qd -Daily weights, I/Os, Mg >2, K > 4  -stop ethacrynic acid, c/w bumex   -stop Entresto 24/26 change to losartan 12.5 tomorrow   -spironolactone 25 qd

## 2023-02-16 NOTE — PROGRESS NOTE ADULT - SUBJECTIVE AND OBJECTIVE BOX
Interval History:  In CCU for pre-treatment     Medications:  acetaminophen     Tablet .. 650 milliGRAM(s) Oral every 6 hours PRN  aluminum hydroxide/magnesium hydroxide/simethicone Suspension 30 milliLiter(s) Oral every 4 hours PRN  aspirin  chewable 81 milliGRAM(s) Oral daily  atorvastatin 40 milliGRAM(s) Oral daily  benzocaine/menthol Lozenge 1 Lozenge Oral every 6 hours  chlorhexidine 2% Cloths 1 Application(s) Topical <User Schedule>  clopidogrel Tablet 75 milliGRAM(s) Oral daily  EPINEPHrine     1 mG/mL Injectable 0.3 milliGRAM(s) IntraMuscular once  ethacrynic acid 50 milliGRAM(s) Oral daily  melatonin 3 milliGRAM(s) Oral at bedtime PRN  pantoprazole    Tablet 40 milliGRAM(s) Oral before breakfast  potassium chloride   Powder 40 milliEquivalent(s) Oral once  sacubitril 24 mG/valsartan 26 mG 1 Tablet(s) Oral two times a day  spironolactone 25 milliGRAM(s) Oral daily      Vitals:  T(C): 36.6 (23 @ 07:00), Max: 36.9 (23 @ 04:00)  HR: 104 (23 @ 07:00) (89 - 108)  BP: 105/58 (23 @ 07:00) (85/49 - 109/63)  BP(mean): 76 (23 @ 07:00) (62 - 83)  RR: 20 (23 @ 07:00) (17 - 30)  SpO2: 94% (23 @ 07:00) (94% - 99%)    Daily     Daily Weight in k.5 (2023 04:00)        I&O's Summary    15 Feb 2023 07:01  -  2023 07:00  --------------------------------------------------------  IN: 660 mL / OUT: 1290 mL / NET: -630 mL        Physical Exam:  GENERAL: No acute distress, well-developed  HEAD:  Atraumatic, Normocephalic  ENT: elevated JVP   CHEST/LUNG: decreased breath sounds at bases   BACK: No spinal tenderness  HEART: Regular rate and rhythm; No murmurs, rubs, or gallops  ABDOMEN: Soft, Nontender, Nondistended; Bowel sounds present  EXTREMITIES:  trace edema   PSYCH: Nl behavior, nl affect  NEUROLOGY: AAOx3, non-focal, cranial nerves intact  SKIN: Normal color, No rashes or lesions    Labs:                        10.1   9.73  )-----------( 476      ( 2023 04:31 )             32.5     -    138  |  102  |  23  ----------------------------<  125<H>  3.7   |  24  |  0.89    Ca    9.1      2023 04:31  Phos  3.9       Mg     2.2         TPro  7.3  /  Alb  3.3  /  TBili  0.4  /  DBili  x   /  AST  11  /  ALT  9<L>  /  AlkPhos  120  -16               Interval History:  In CCU for pre-treatment     Medications:  acetaminophen     Tablet .. 650 milliGRAM(s) Oral every 6 hours PRN  aluminum hydroxide/magnesium hydroxide/simethicone Suspension 30 milliLiter(s) Oral every 4 hours PRN  aspirin  chewable 81 milliGRAM(s) Oral daily  atorvastatin 40 milliGRAM(s) Oral daily  benzocaine/menthol Lozenge 1 Lozenge Oral every 6 hours  chlorhexidine 2% Cloths 1 Application(s) Topical <User Schedule>  clopidogrel Tablet 75 milliGRAM(s) Oral daily  EPINEPHrine     1 mG/mL Injectable 0.3 milliGRAM(s) IntraMuscular once  ethacrynic acid 50 milliGRAM(s) Oral daily  melatonin 3 milliGRAM(s) Oral at bedtime PRN  pantoprazole    Tablet 40 milliGRAM(s) Oral before breakfast  potassium chloride   Powder 40 milliEquivalent(s) Oral once  sacubitril 24 mG/valsartan 26 mG 1 Tablet(s) Oral two times a day  spironolactone 25 milliGRAM(s) Oral daily      Vitals:  T(C): 36.6 (23 @ 07:00), Max: 36.9 (23 @ 04:00)  HR: 104 (23 @ 07:00) (89 - 108)  BP: 105/58 (23 @ 07:00) (85/49 - 109/63)  BP(mean): 76 (23 @ 07:00) (62 - 83)  RR: 20 (23 @ 07:00) (17 - 30)  SpO2: 94% (23 @ 07:00) (94% - 99%)    Daily     Daily Weight in k.5 (2023 04:00)        I&O's Summary    15 Feb 2023 07:01  -  2023 07:00  --------------------------------------------------------  IN: 660 mL / OUT: 1290 mL / NET: -630 mL        Physical Exam:  GENERAL: No acute distress, well-developed  HEAD:  Atraumatic, Normocephalic  ENT: improved JVP    CHEST/LUNG: decreased breath sounds at bases   BACK: No spinal tenderness  HEART: Regular rate and rhythm; No murmurs, rubs, or gallops  ABDOMEN: Soft, Nontender, Nondistended; Bowel sounds present  EXTREMITIES:  trace edema   PSYCH: Nl behavior, nl affect  NEUROLOGY: AAOx3, non-focal, cranial nerves intact  SKIN: Normal color, No rashes or lesions    Labs:                        10.1   9.73  )-----------( 476      ( 2023 04:31 )             32.5     -    138  |  102  |  23  ----------------------------<  125<H>  3.7   |  24  |  0.89    Ca    9.1      2023 04:31  Phos  3.9       Mg     2.2     -    TPro  7.3  /  Alb  3.3  /  TBili  0.4  /  DBili  x   /  AST  11  /  ALT  9<L>  /  AlkPhos  120  -16

## 2023-02-16 NOTE — PROGRESS NOTE ADULT - ATTENDING COMMENTS
66 yo woman with recently diagnosed ICMP and stage C HFrEF. LHC found occluded LAD (seemed to be missed MI).   Was discharged home on GDMT but no diuretics, developed worsening fluid retention and was started on Lasix by outpatient cardiologist. Improved but then developed skin rash and stopped Lasix which resolved rash.   Had then worsening orthopnea, PND and LE edema. Was admitted to [facility] and treated with Ethacrinic acid with improvement.     HF consulted due to difficulty introducing GDMT limited by hypotension with dizziness.   LVEF on discharge was 40% now it is <20% with remodeled LV and decreased RV function     Exam shows warm extremities with 1-2+ edema, narrow pulse pressure and tachycardia   Labs show normal sodium, Cr and LFTs    - Hold BB. Would restart at low dose once euvolemic.  - Entresto 24-26 mg bid   - Spironolactone 25 mg daily   - Appreciate recs from allergy to re-challenge with loop diuretics given that they will be a cornerstone of her therapy at home and reaction was not serious.   - Would start with Bumex 2 mg daily   - We will follow with you     Dustin Cabrera MD .

## 2023-02-17 DIAGNOSIS — I25.2 OLD MYOCARDIAL INFARCTION: ICD-10-CM

## 2023-02-17 LAB
ALBUMIN SERPL ELPH-MCNC: 3.4 G/DL — SIGNIFICANT CHANGE UP (ref 3.3–5)
ALP SERPL-CCNC: 110 U/L — SIGNIFICANT CHANGE UP (ref 40–120)
ALT FLD-CCNC: 9 U/L — LOW (ref 10–45)
ANION GAP SERPL CALC-SCNC: 12 MMOL/L — SIGNIFICANT CHANGE UP (ref 5–17)
AST SERPL-CCNC: 12 U/L — SIGNIFICANT CHANGE UP (ref 10–40)
BASE EXCESS BLDV CALC-SCNC: 7.1 MMOL/L — HIGH (ref -2–3)
BILIRUB SERPL-MCNC: 0.6 MG/DL — SIGNIFICANT CHANGE UP (ref 0.2–1.2)
BUN SERPL-MCNC: 24 MG/DL — HIGH (ref 7–23)
CA-I SERPL-SCNC: 1.23 MMOL/L — SIGNIFICANT CHANGE UP (ref 1.15–1.33)
CALCIUM SERPL-MCNC: 9.3 MG/DL — SIGNIFICANT CHANGE UP (ref 8.4–10.5)
CHLORIDE BLDV-SCNC: 102 MMOL/L — SIGNIFICANT CHANGE UP (ref 96–108)
CHLORIDE SERPL-SCNC: 102 MMOL/L — SIGNIFICANT CHANGE UP (ref 96–108)
CO2 BLDV-SCNC: 33 MMOL/L — HIGH (ref 22–26)
CO2 SERPL-SCNC: 27 MMOL/L — SIGNIFICANT CHANGE UP (ref 22–31)
CREAT SERPL-MCNC: 0.93 MG/DL — SIGNIFICANT CHANGE UP (ref 0.5–1.3)
EGFR: 68 ML/MIN/1.73M2 — SIGNIFICANT CHANGE UP
GAS PNL BLDV: 137 MMOL/L — SIGNIFICANT CHANGE UP (ref 136–145)
GAS PNL BLDV: SIGNIFICANT CHANGE UP
GAS PNL BLDV: SIGNIFICANT CHANGE UP
GLUCOSE BLDV-MCNC: 132 MG/DL — HIGH (ref 70–99)
GLUCOSE SERPL-MCNC: 101 MG/DL — HIGH (ref 70–99)
HCO3 BLDV-SCNC: 32 MMOL/L — HIGH (ref 22–29)
HCT VFR BLD CALC: 35.2 % — SIGNIFICANT CHANGE UP (ref 34.5–45)
HCT VFR BLDA CALC: 34 % — LOW (ref 34.5–46.5)
HGB BLD CALC-MCNC: 11.4 G/DL — LOW (ref 11.7–16.1)
HGB BLD-MCNC: 11 G/DL — LOW (ref 11.5–15.5)
HOROWITZ INDEX BLDV+IHG-RTO: 21 — SIGNIFICANT CHANGE UP
LACTATE BLDV-MCNC: 1.4 MMOL/L — SIGNIFICANT CHANGE UP (ref 0.5–2)
MAGNESIUM SERPL-MCNC: 2.2 MG/DL — SIGNIFICANT CHANGE UP (ref 1.6–2.6)
MCHC RBC-ENTMCNC: 28.1 PG — SIGNIFICANT CHANGE UP (ref 27–34)
MCHC RBC-ENTMCNC: 31.3 GM/DL — LOW (ref 32–36)
MCV RBC AUTO: 89.8 FL — SIGNIFICANT CHANGE UP (ref 80–100)
NRBC # BLD: 0 /100 WBCS — SIGNIFICANT CHANGE UP (ref 0–0)
PCO2 BLDV: 46 MMHG — HIGH (ref 39–42)
PH BLDV: 7.45 — HIGH (ref 7.32–7.43)
PHOSPHATE SERPL-MCNC: 4.3 MG/DL — SIGNIFICANT CHANGE UP (ref 2.5–4.5)
PLATELET # BLD AUTO: 448 K/UL — HIGH (ref 150–400)
PO2 BLDV: 49 MMHG — HIGH (ref 25–45)
POTASSIUM BLDV-SCNC: 3.9 MMOL/L — SIGNIFICANT CHANGE UP (ref 3.5–5.1)
POTASSIUM SERPL-MCNC: 3.5 MMOL/L — SIGNIFICANT CHANGE UP (ref 3.5–5.3)
POTASSIUM SERPL-SCNC: 3.5 MMOL/L — SIGNIFICANT CHANGE UP (ref 3.5–5.3)
PROT SERPL-MCNC: 7.4 G/DL — SIGNIFICANT CHANGE UP (ref 6–8.3)
RBC # BLD: 3.92 M/UL — SIGNIFICANT CHANGE UP (ref 3.8–5.2)
RBC # FLD: 15.1 % — HIGH (ref 10.3–14.5)
SAO2 % BLDV: 79.1 % — SIGNIFICANT CHANGE UP (ref 67–88)
SODIUM SERPL-SCNC: 141 MMOL/L — SIGNIFICANT CHANGE UP (ref 135–145)
WBC # BLD: 9 K/UL — SIGNIFICANT CHANGE UP (ref 3.8–10.5)
WBC # FLD AUTO: 9 K/UL — SIGNIFICANT CHANGE UP (ref 3.8–10.5)

## 2023-02-17 PROCEDURE — 99291 CRITICAL CARE FIRST HOUR: CPT

## 2023-02-17 PROCEDURE — 93010 ELECTROCARDIOGRAM REPORT: CPT | Mod: 76

## 2023-02-17 PROCEDURE — 99291 CRITICAL CARE FIRST HOUR: CPT | Mod: GC

## 2023-02-17 PROCEDURE — 99233 SBSQ HOSP IP/OBS HIGH 50: CPT

## 2023-02-17 PROCEDURE — 99292 CRITICAL CARE ADDL 30 MIN: CPT

## 2023-02-17 RX ORDER — BUMETANIDE 0.25 MG/ML
2 INJECTION INTRAMUSCULAR; INTRAVENOUS DAILY
Refills: 0 | Status: DISCONTINUED | OUTPATIENT
Start: 2023-02-18 | End: 2023-02-21

## 2023-02-17 RX ORDER — LOSARTAN POTASSIUM 100 MG/1
12.5 TABLET, FILM COATED ORAL DAILY
Refills: 0 | Status: DISCONTINUED | OUTPATIENT
Start: 2023-02-17 | End: 2023-02-21

## 2023-02-17 RX ORDER — POTASSIUM CHLORIDE 20 MEQ
40 PACKET (EA) ORAL ONCE
Refills: 0 | Status: COMPLETED | OUTPATIENT
Start: 2023-02-17 | End: 2023-02-17

## 2023-02-17 RX ADMIN — CLOPIDOGREL BISULFATE 75 MILLIGRAM(S): 75 TABLET, FILM COATED ORAL at 14:07

## 2023-02-17 RX ADMIN — Medication 30 MILLILITER(S): at 04:44

## 2023-02-17 RX ADMIN — PANTOPRAZOLE SODIUM 40 MILLIGRAM(S): 20 TABLET, DELAYED RELEASE ORAL at 05:11

## 2023-02-17 RX ADMIN — CHLORHEXIDINE GLUCONATE 1 APPLICATION(S): 213 SOLUTION TOPICAL at 05:12

## 2023-02-17 RX ADMIN — Medication 40 MILLIEQUIVALENT(S): at 05:11

## 2023-02-17 RX ADMIN — ATORVASTATIN CALCIUM 40 MILLIGRAM(S): 80 TABLET, FILM COATED ORAL at 14:08

## 2023-02-17 RX ADMIN — LOSARTAN POTASSIUM 12.5 MILLIGRAM(S): 100 TABLET, FILM COATED ORAL at 05:59

## 2023-02-17 RX ADMIN — Medication 30 MILLILITER(S): at 18:47

## 2023-02-17 RX ADMIN — SPIRONOLACTONE 25 MILLIGRAM(S): 25 TABLET, FILM COATED ORAL at 05:16

## 2023-02-17 RX ADMIN — Medication 81 MILLIGRAM(S): at 14:08

## 2023-02-17 NOTE — PROGRESS NOTE ADULT - ASSESSMENT
67F with recent late presenting STEMI without prox LAD (pLAD 100%, mCx 50%, OM2 40%), HFrEF 35% now 19%, presenting with dyspnea, abdominal pain, with recent outpatient visit with Dr. Baron found to be volume overloaded, started on lasix but with allergy. Tolerated Bumex 2 PO    Cards tests:  1/22/23 LHC: Short LM, pLAD with 100% stenosis with faint collaterals from RPDA, Cx there is 50% stenosis in middle third portion of segment after branch to OM1, 40% stenosis in proximal third, mRCA 20% stenosis   1/22/23 TTE: LVEF 41%, MAC, tethered MV, mild MR, calcified AV with normal opening, Mod segmental LV dysfunction, apex akinetic, normal RV  2/13/23 TTE: LVEF 19%, LVIDd 5.5, No thrombus, Moderate diastolic dysfunction, Borderline decrease RV s', PASP 48

## 2023-02-17 NOTE — PROGRESS NOTE ADULT - CRITICAL CARE ATTENDING COMMENT
68 yo woman with recently diagnosed ICMP and stage C HFrEF. LHC found occluded LAD (seemed to be missed MI).   Was discharged home on GDMT but no diuretics, developed worsening fluid retention and was started on Lasix by outpatient cardiologist. Improved but then developed skin rash and stopped Lasix which resolved rash.     AAO x 3  Had then worsening orthopnea, PND and LE edema. Was admitted to [facility] and treated with Ethacrinic acid with improvement. Transferred to CICU for bumex desensitization   LVEF on discharge was 40% now it is <20% with remodeled LV and decreased RV function   Exam shows warm extremities with 1-2+ edema, narrow pulse pressure and tachycardia   - BB is held. Would restart at low dose once euvolemic.  - Entresto 24-26 mg bid is not well tolerated by the pt - will hold and start low dose losartan tomorrow  - Spironolactone 25 mg daily   - monitor urine output  - keep negative  - keep K> 4, Mg > 2    Patient is critically ill, requiring critical care services. Despite the current condition, the patient is at a high risk of clinical and hemodynamic demise 66 yo woman with recently diagnosed ICMP and stage C HFrEF. LHC found occluded LAD (seemed to be missed MI).   Was discharged home on GDMT but no diuretics, developed worsening fluid retention and was started on Lasix by outpatient cardiologist. Improved but then developed skin rash and stopped Lasix which resolved rash.     AAO x 3  Had then worsening orthopnea, PND and LE edema. Was admitted to [facility] and treated with Ethacrinic acid with improvement. Transferred to CICU for bumex desensitization   LVEF on discharge was 40% now it is <20% with remodeled LV and decreased RV function   Exam shows warm extremities with 1-2+ edema, narrow pulse pressure and tachycardia   - BB is held. Would restart at low dose once euvolemic.  - Entresto 24-26 mg bid is not well tolerated by the pt - will hold and start low dose losartan this am  - Spironolactone 25 mg daily   - monitor urine output  - keep negative  - keep K> 4, Mg > 2    Patient is critically ill, requiring critical care services. Despite the current condition, the patient is at a high risk of clinical and hemodynamic demise

## 2023-02-17 NOTE — CHART NOTE - NSCHARTNOTEFT_GEN_A_CORE
CICU TRANSFER NOTE  Admission date: 2/11/23  Chief complaint/ Diagnosis: Bumax desensitization   HPI: 67F recently admitted to [facility] with late presentation anterior MI (Jan 2022), CAD w/ 100% pLAD, HFrEF (EF 40%) likely ICM presenting with persistent abdominal discomfort and shortness of breath at night.   After discharge from hospital for anterior wall STEMI, has had persistent abdominal discomfort and increasing dyspnea when lying flat. Dyspnea is not present when upright or with exertion but has been unable to lie flat at night. Denies peripheral edema or weight gain. Denies chest pain or diaphoresis since the initial STEMI. Was seen by outpatient cardiologist Dr. Baron and was started on Lasix however developed a diffuse rash < 1 day and therefore discontinued the medication. She presents today because she woke up in the middle of the night gasping for air with worsening abdominal discomfort.  Abdominal discomfort, she describes not as pain, but with belching, early satiety, some acidic taste in the morning. Protonix has not provided her relief although holding the brilinta one night did help. Was asked to switch brilinta to plavix for this reason by Dr. Baron.   In ED, vitals were normal, EKG was unchanged from prior, Labs were overall unremarkable, CXR with small R PLEF. Cardiology was consulted, and patient was started on metolazone for diuresis.  (11 Feb 2023 16:23)    TTE with Doppler (w/Cont) (02.13.23) EF 19%   Severe segmental left ventricular systolic dysfunction.  No left ventricular thrombus. Endocardial visualization  enhanced with intravenous injection of Ultrasonic Enhancing  Agent (Definity).The mid-apical anterior wall, mid to  apical anterolateral wall, all apical segments and septum  are severely hypokinetic to akinetic.  The inferior wal is  hypokinetic. 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 No thrombus  is seen.  Alcala's done on 119 and 126; EF 19%  2. Moderate diastolic dysfunction (Stage II). Increased  E/e'  is consistent with elevated left ventricular filling  pressure.  3. Normal right ventricular size and borderline RV  function. RV S' 9.57cm/s  4. Normal tricuspid valve. Mild-moderate tricuspid  regurgitation.      Interval history: complete bumax desensitization   0.5mg IVP x2   and received additional dose Bumax 2mg IVP this morning   Voids 200-300cc/hr Overall Net negative ~1.8L     REVIEW OF SYSTEMS  Denies CP, Palpitation, SOB, Dyspnea [ x ] All other systems negative    MEDICATIONS  (STANDING)  aspirin  chewable 81 milliGRAM(s) Oral daily  atorvastatin 40 milliGRAM(s) Oral daily  benzocaine/menthol Lozenge 1 Lozenge Oral every 6 hours  chlorhexidine 2% Cloths 1 Application(s) Topical <User Schedule>  clopidogrel Tablet 75 milliGRAM(s) Oral daily  EPINEPHrine     1 mG/mL Injectable 0.3 milliGRAM(s) IntraMuscular once  losartan 12.5 milliGRAM(s) Oral daily  pantoprazole    Tablet 40 milliGRAM(s) Oral before breakfast  spironolactone 25 milliGRAM(s) Oral daily    MEDICATIONS  (PRN):  acetaminophen     Tablet .. 650 milliGRAM(s) Oral every 6 hours PRN Temp greater or equal to 38C (100.4F), Mild Pain (1 - 3)  albuterol/ipratropium for Nebulization. 3 milliLiter(s) Nebulizer once PRN Respiratory Distress  aluminum hydroxide/magnesium hydroxide/simethicone Suspension 30 milliLiter(s) Oral every 4 hours PRN Dyspepsia  diphenhydrAMINE 50 milliGRAM(s) Oral once PRN Rash and/or Itching  melatonin 3 milliGRAM(s) Oral at bedtime PRN Insomnia    PAST MEDICAL & SURGICAL HISTORY:  History of acute anterior wall MI  No significant past surgical history    FAMILY HISTORY:  No pertinent family history in first degree relatives    Allergy   Lasix (Rash (Mod to Severe))    ICU Vital Signs Last 24 Hrs  T(C): 37 (Max: 37.1)  HR: 91 (89 - 111)  BP: 90/50  (78/50 - 106/61)  BP(mean): 65  (56 - 77)  RR: 18 (17 - 22)  SpO2: 98% (91% - 100%) on room air     I&O's Summary  IN: 628 mL / OUT: 2500 mL / NET: -1872 mL    PHYSICAL EXAM  Appearance: Normal, NAD  HEAD:  Atraumatic, Normocephalic  EYES: EOMI, PERRLA, conjunctiva and sclera clear  NECK: Supple, No JVD  CHEST/LUNG: Clear to auscultation bilaterally; No wheezing  HEART: Normal S1, S2. No murmurs, rubs, or gallops  ABDOMEN: + Bowel sounds, Soft, NT, ND   EXTREMITIES:  2+ Peripheral Pulses, No clubbing, cyanosis, or edema  NEUROLOGY: non-focal, aaox3  SKIN: No rashes or lesions    Interpretation of Telemetry:                          11.0   9.00  )-----------( 448              35.2       141  |  102  |  24<H>  --------------------------<  101<H>  3.5 (rEPLETED)  |  27  |  0.93    Ca    9.3     Phos  4.3     Mg     2.2    TPro  7.4  /  Alb  3.4  /  TBili  0.6  /  DBili  x   /  AST  12  /  ALT  9<L>  /  AlkPhos  110                Assessment and Plan:   · Assessment	  67F with recent late presenting STEMI without prox LAD (pLAD 100%, mCx 50%, OM2 40%), HFrEF 35% now 19%, presenting with dyspnea, abdominal pain, with recent outpatient visit with Dr. Baron found to be volume overloaded, started on lasix but with allergy.  Allergy was consulted for bumex desensitization. Pt transferred to the CICU for hemodynamic monitoring while receiving bumex desensitization.     ====================== NEUROLOGY=====================  A and O x 4  - continue to monitor mental status per protocol   - Ambulate as tolerated    ==================== RESPIRATORY======================  comfortable on room air  - Monitor SpO2 with goal >94%     ====================CARDIOVASCULAR==================  CHF exacerbation.   - continue Losartan 12.5 mg  - Cont dulce 25 mg   - finishing bumex desensitization today  - BB has been held 2/2 bumex desensitization   -Daily weights, I/Os, Mg >2, K > 4    CAD , non-revascularized  - Continue ASA, plavix, lipitor.   -  GDMT as above     Allergy  - Allergic rx to lasix  - bumex desensitization per allergy team as below  - received full dose of bumex at 4 PM   - allergy following      ===================HEMATOLOGIC/ONC ===================  Microcytic Anemia, stable   - continue to monitor H/H  - Patient refusing DVT PPX. Continue SCDs    ===================== RENAL =========================  Scr wnl  - continue to monitor SCr, BUN, and lytes  - Monitor I&Os    ==================== GASTROINTESTINAL===================  - continue with DASH diet  - monitor for reg BM while inpatient      =======================    ENDOCRINE  =====================  No active issues  - gluc well controlled. Monitor BG with goal 100-180     ========================INFECTIOUS DISEASE================  Afebrile, no leukocytosis    - monitor wbc and temp curve   - continue to monitor off abx    MIS: Full code  To do   ambulate as tolerated  When she sleeps, she tends to be hypotensive (70-90/50) but as soon as she wakes up, she recovers  Reassess bumax dose /diuresis need today by HF  Pt will be transferred to tele floor. Updated info given to the patient and family

## 2023-02-17 NOTE — PROGRESS NOTE ADULT - PROBLEM SELECTOR PLAN 3
Assessment:  - Abdominal discomfort, not pain, associated with early satiety, bloating, belching, reflux  - Dyspepsia, mildly improved after holding Brilinta   - Ddx: medication induced, less likely anginal component, CHF volume may be contributing although history is slightly inconsistent, some concern for stress ulcer i/s/o recent anterior wall MI     Plan:  - improved off Brilinta and with improved volume status  - c/w Protonix daily

## 2023-02-17 NOTE — PROGRESS NOTE ADULT - SUBJECTIVE AND OBJECTIVE BOX
Patient is a 67y old  Female who presents with a chief complaint of Shortness of breath (17 Feb 2023 07:43)      SUBJECTIVE / OVERNIGHT EVENTS: Patient seen and examined at bedside. No acute events overnight. Patient seen in CCU. No SOB/CP. Improving abdominal distention. Improving orthopnea. Has some non-pitting swelling of feet bilaterally    MEDICATIONS  (STANDING):  aspirin  chewable 81 milliGRAM(s) Oral daily  atorvastatin 40 milliGRAM(s) Oral daily  benzocaine/menthol Lozenge 1 Lozenge Oral every 6 hours  chlorhexidine 2% Cloths 1 Application(s) Topical <User Schedule>  clopidogrel Tablet 75 milliGRAM(s) Oral daily  EPINEPHrine     1 mG/mL Injectable 0.3 milliGRAM(s) IntraMuscular once  losartan 12.5 milliGRAM(s) Oral daily  pantoprazole    Tablet 40 milliGRAM(s) Oral before breakfast  spironolactone 25 milliGRAM(s) Oral daily    MEDICATIONS  (PRN):  acetaminophen     Tablet .. 650 milliGRAM(s) Oral every 6 hours PRN Temp greater or equal to 38C (100.4F), Mild Pain (1 - 3)  albuterol/ipratropium for Nebulization. 3 milliLiter(s) Nebulizer once PRN Respiratory Distress  aluminum hydroxide/magnesium hydroxide/simethicone Suspension 30 milliLiter(s) Oral every 4 hours PRN Dyspepsia  diphenhydrAMINE 50 milliGRAM(s) Oral once PRN Rash and/or Itching  melatonin 3 milliGRAM(s) Oral at bedtime PRN Insomnia      CAPILLARY BLOOD GLUCOSE        I&O's Summary    16 Feb 2023 07:01  -  17 Feb 2023 07:00  --------------------------------------------------------  IN: 628 mL / OUT: 2500 mL / NET: -1872 mL        PHYSICAL EXAM:  Vital Signs Last 24 Hrs  T(C): 37 (17 Feb 2023 04:00), Max: 37.1 (16 Feb 2023 11:00)  T(F): 98.6 (17 Feb 2023 04:00), Max: 98.7 (16 Feb 2023 11:00)  HR: 91 (17 Feb 2023 07:00) (89 - 111)  BP: 90/50 (17 Feb 2023 07:00) (78/50 - 106/61)  BP(mean): 65 (17 Feb 2023 07:00) (56 - 77)  RR: 18 (17 Feb 2023 05:00) (17 - 22)  SpO2: 98% (17 Feb 2023 07:00) (91% - 100%)    Parameters below as of 16 Feb 2023 19:00  Patient On (Oxygen Delivery Method): room air        GEN: female in NAD, appears comfortable, no diaphoresis  EYES: No scleral injection, PERRL, EOMI  ENTM: neck supple & symmetric without tracheal deviation, moist membranes, no gross hearing impairment, thyroid gland not enlarged  CV: +S1/S2, no m/r/g, no abdominal bruit, non-pitting edema of feet  RESP: breathing comfortably, no respiratory accessory muscle use, CTAB, no w/r/r  GI: normoactive BS, soft, NTND, no rebounding/guarding, no palpable masses    LABS:                        11.0   9.00  )-----------( 448      ( 17 Feb 2023 04:14 )             35.2     02-17    141  |  102  |  24<H>  ----------------------------<  101<H>  3.5   |  27  |  0.93    Ca    9.3      17 Feb 2023 04:10  Phos  4.3     02-17  Mg     2.2     02-17    TPro  7.4  /  Alb  3.4  /  TBili  0.6  /  DBili  x   /  AST  12  /  ALT  9<L>  /  AlkPhos  110  02-17                RADIOLOGY & ADDITIONAL TESTS:  Results Reviewed:   Imaging Personally Reviewed:  Electrocardiogram Personally Reviewed:    COORDINATION OF CARE:  Care Discussed with Consultants/Other Providers [Y/N]:  Prior or Outpatient Records Reviewed [Y/N]:

## 2023-02-17 NOTE — CHART NOTE - NSCHARTNOTEFT_GEN_A_CORE
CCU Transfer Note    Transfer from: CCU    Transfer to: ( x ) Telemetry --    Accepting Physician:    Team covering on floor: ACP/Resident team   Signout given to: Hospitalist and     CCU COURSE:  67F recently admitted to [facility] with late presentation anterior MI (Jan 2022 without revasc given viability study results), CAD w/ 100% pLAD, HFrEF/ICM (EF 40%) presenting with persistent abdominal discomfort and shortness of breath at night.   After discharge from hospital for anterior wall STEMI, has had persistent abdominal discomfort and increasing dyspnea when lying flat. Dyspnea is not present when upright or with exertion but has been unable to lie flat at night. Denies peripheral edema or weight gain. Denies chest pain or diaphoresis since the initial STEMI. Was seen by outpatient cardiologist Dr. Baron and was started on Lasix however developed a diffuse rash < 1 day and therefore discontinued the medication. She presents today because she woke up in the middle of the night gasping for air with worsening abdominal discomfort.  In ED, vitals were normal, EKG was unchanged from prior, Labs were overall unremarkable, CXR with small R PLEF. Cardiology was consulted, and patient was started on metolazone for diuresis.   Patient transferred to CICU for Bumex desensitization. s/p protocol with no acute issues. Patient received full dose Bumex 2mg PO at 4PM on 2/16 without s/s of allergic reaction.      ====================== NEUROLOGY=====================  A and O x 4  - continue to monitor mental status per protocol   - Ambulate as tolerated    ==================== RESPIRATORY======================  comfortable on room air with stable SPO2  - Monitor SpO2 with goal >94%     ====================CARDIOVASCULAR==================  CHF exacerbation.   - continue Losartan 12.5 mg  - Cont dulce 25 mg   - s/p bumex desensitization completed 2/16  - BB has been held 2/2 bumex desensitization given interference with epinephrine   - continue bumex dosing per HF recs  - Daily weights, I/Os, Mg >2, K > 4    CAD , non-revascularized  - Continue ASA, plavix, lipitor.   -  GDMT as above     Allergy  - Allergic rx to lasix  - bumex desensitization per allergy team as below  - received full dose of bumex at 4 PM 2/16 without s/s allergic rxn   - allergy following      ===================HEMATOLOGIC/ONC ===================  Microcytic Anemia, stable   - continue to monitor H/H  - Patient refusing DVT PPX. Continue SCDs    ===================== RENAL =========================  Scr wnl  - continue to monitor SCr, BUN, and lytes  - Monitor I&Os    ==================== GASTROINTESTINAL===================  - continue with DASH diet  - last BM 2/16      =======================    ENDOCRINE  =====================  No active issues  - gluc well controlled. Monitor BG with goal 100-180     ========================INFECTIOUS DISEASE================  Afebrile, no leukocytosis    - monitor wbc and temp curve   - continue to monitor off abx      FOR FOLLOW UP:  [] Bumex dosing  [] HF recs            PAST MEDICAL & SURGICAL HISTORY:  History of acute anterior wall MI      No significant past surgical history          Vital Signs Last 24 Hrs  T(C): 36.7 (17 Feb 2023 00:00), Max: 37.1 (16 Feb 2023 11:00)  T(F): 98 (17 Feb 2023 00:00), Max: 98.7 (16 Feb 2023 11:00)  HR: 89 (17 Feb 2023 01:00) (89 - 111)  BP: 86/46 (17 Feb 2023 01:00) (78/50 - 106/61)  BP(mean): 62 (17 Feb 2023 01:00) (60 - 82)  RR: 18 (17 Feb 2023 01:00) (17 - 30)  SpO2: 93% (17 Feb 2023 01:00) (93% - 100%)    Parameters below as of 16 Feb 2023 19:00  Patient On (Oxygen Delivery Method): room air      MEDICATIONS  (STANDING):  aspirin  chewable 81 milliGRAM(s) Oral daily  atorvastatin 40 milliGRAM(s) Oral daily  benzocaine/menthol Lozenge 1 Lozenge Oral every 6 hours  chlorhexidine 2% Cloths 1 Application(s) Topical <User Schedule>  clopidogrel Tablet 75 milliGRAM(s) Oral daily  EPINEPHrine     1 mG/mL Injectable 0.3 milliGRAM(s) IntraMuscular once  losartan 12.5 milliGRAM(s) Oral daily  pantoprazole    Tablet 40 milliGRAM(s) Oral before breakfast  spironolactone 25 milliGRAM(s) Oral daily                            10.1   9.73  )-----------( 476      ( 16 Feb 2023 04:31 )             32.5     02-16    138  |  102  |  23  ----------------------------<  125<H>  3.7   |  24  |  0.89    Ca    9.1      16 Feb 2023 04:31  Phos  3.9     02-16  Mg     2.2     02-16    TPro  7.3  /  Alb  3.3  /  TBili  0.4  /  DBili  x   /  AST  11  /  ALT  9<L>  /  AlkPhos  120  02-16            Devora Wiggins CCU PA CCU Transfer Note    Transfer from: CCU    Transfer to: ( x ) Telemetry --    Accepting Physician: BEVERLEY Chamorro  Team covering on floor: Resident team   Signout given to: Hospitalist to signout to MAR [id number]    CCU COURSE:  67F recently admitted to [facility] with late presentation anterior MI (Jan 2022 without revasc given viability study results), CAD w/ 100% pLAD, HFrEF/ICM (EF 40%) presenting with persistent abdominal discomfort and shortness of breath at night.   After discharge from hospital for anterior wall STEMI, has had persistent abdominal discomfort and increasing dyspnea when lying flat. Dyspnea is not present when upright or with exertion but has been unable to lie flat at night. Denies peripheral edema or weight gain. Denies chest pain or diaphoresis since the initial STEMI. Was seen by outpatient cardiologist Dr. Baron and was started on Lasix however developed a diffuse rash < 1 day and therefore discontinued the medication. She presents today because she woke up in the middle of the night gasping for air with worsening abdominal discomfort.  In ED, vitals were normal, EKG was unchanged from prior, Labs were overall unremarkable, CXR with small R PLEF. Cardiology was consulted, and patient was started on metolazone for diuresis.   Patient transferred to CICU for Bumex desensitization. s/p protocol with no acute issues. Patient received full dose Bumex 2mg PO at 4PM on 2/16 without s/s of allergic reaction.      ====================== NEUROLOGY=====================  A and O x 4  - continue to monitor mental status per protocol   - Ambulate as tolerated    ==================== RESPIRATORY======================  comfortable on room air with stable SPO2  - Monitor SpO2 with goal >94%     ====================CARDIOVASCULAR==================  CHF exacerbation.   - continue Losartan 12.5 mg  - Cont dulce 25 mg   - s/p bumex desensitization completed 2/16  - BB has been held 2/2 bumex desensitization given interference with epinephrine   - continue bumex dosing per HF recs  - Daily weights, I/Os, Mg >2, K > 4    CAD , non-revascularized  - Continue ASA, plavix, lipitor.   -  GDMT as above     Allergy  - Allergic rx to lasix  - bumex desensitization per allergy team as below  - received full dose of bumex at 4 PM 2/16 without s/s allergic rxn   - allergy following      ===================HEMATOLOGIC/ONC ===================  Microcytic Anemia, stable   - continue to monitor H/H  - Patient refusing DVT PPX. Continue SCDs    ===================== RENAL =========================  Scr wnl  - continue to monitor SCr, BUN, and lytes  - Monitor I&Os    ==================== GASTROINTESTINAL===================  - continue with DASH diet  - last BM 2/16      =======================    ENDOCRINE  =====================  No active issues  - gluc well controlled. Monitor BG with goal 100-180     ========================INFECTIOUS DISEASE================  Afebrile, no leukocytosis    - monitor wbc and temp curve   - continue to monitor off abx      FOR FOLLOW UP:  [] Bumex dosing  [] HF recs            PAST MEDICAL & SURGICAL HISTORY:  History of acute anterior wall MI      No significant past surgical history          Vital Signs Last 24 Hrs  T(C): 36.7 (17 Feb 2023 00:00), Max: 37.1 (16 Feb 2023 11:00)  T(F): 98 (17 Feb 2023 00:00), Max: 98.7 (16 Feb 2023 11:00)  HR: 89 (17 Feb 2023 01:00) (89 - 111)  BP: 86/46 (17 Feb 2023 01:00) (78/50 - 106/61)  BP(mean): 62 (17 Feb 2023 01:00) (60 - 82)  RR: 18 (17 Feb 2023 01:00) (17 - 30)  SpO2: 93% (17 Feb 2023 01:00) (93% - 100%)    Parameters below as of 16 Feb 2023 19:00  Patient On (Oxygen Delivery Method): room air      MEDICATIONS  (STANDING):  aspirin  chewable 81 milliGRAM(s) Oral daily  atorvastatin 40 milliGRAM(s) Oral daily  benzocaine/menthol Lozenge 1 Lozenge Oral every 6 hours  chlorhexidine 2% Cloths 1 Application(s) Topical <User Schedule>  clopidogrel Tablet 75 milliGRAM(s) Oral daily  EPINEPHrine     1 mG/mL Injectable 0.3 milliGRAM(s) IntraMuscular once  losartan 12.5 milliGRAM(s) Oral daily  pantoprazole    Tablet 40 milliGRAM(s) Oral before breakfast  spironolactone 25 milliGRAM(s) Oral daily                            10.1   9.73  )-----------( 476      ( 16 Feb 2023 04:31 )             32.5     02-16    138  |  102  |  23  ----------------------------<  125<H>  3.7   |  24  |  0.89    Ca    9.1      16 Feb 2023 04:31  Phos  3.9     02-16  Mg     2.2     02-16    TPro  7.3  /  Alb  3.3  /  TBili  0.4  /  DBili  x   /  AST  11  /  ALT  9<L>  /  AlkPhos  120  02-16            Devora Wiggins CCU PA

## 2023-02-17 NOTE — PROGRESS NOTE ADULT - ATTENDING COMMENTS
Feels well. Urinated well. BP slightly better with transition to losartan. On exam, JVP approx 8-10 with HJR, tachycardic, no m/r/g, CTAB, nontender abdomen, no pedal edema. Labs reviewed - K 3.5, BUN/Cr 24/0.93, BNP 10k.   - c/w losartan 12.5 mg daily  - c/w bumex 2 mg daily  - start dulce 25 mg daily  - eventual BB  - discussed possible need for RHC given concerning features of low BP, tachycardia

## 2023-02-17 NOTE — PROGRESS NOTE ADULT - SUBJECTIVE AND OBJECTIVE BOX
CICU DAY NOTE  Admission date: 2/11/23  Chief complaint/ Diagnosis: Bumax desensitization   HPI: 67F recently admitted to [facility] with late presentation anterior MI (Jan 2022), CAD w/ 100% pLAD, HFrEF (EF 40%) likely ICM presenting with persistent abdominal discomfort and shortness of breath at night.   After discharge from hospital for anterior wall STEMI, has had persistent abdominal discomfort and increasing dyspnea when lying flat. Dyspnea is not present when upright or with exertion but has been unable to lie flat at night. Denies peripheral edema or weight gain. Denies chest pain or diaphoresis since the initial STEMI. Was seen by outpatient cardiologist Dr. Baron and was started on Lasix however developed a diffuse rash < 1 day and therefore discontinued the medication. She presents today because she woke up in the middle of the night gasping for air with worsening abdominal discomfort.  Abdominal discomfort, she describes not as pain, but with belching, early satiety, some acidic taste in the morning. Protonix has not provided her relief although holding the brilinta one night did help. Was asked to switch brilinta to plavix for this reason by Dr. Baron.   In ED, vitals were normal, EKG was unchanged from prior, Labs were overall unremarkable, CXR with small R PLEF. Cardiology was consulted, and patient was started on metolazone for diuresis.  (11 Feb 2023 16:23)    TTE with Doppler (w/Cont) (02.13.23) EF 19%   Severe segmental left ventricular systolic dysfunction.  No left ventricular thrombus. Endocardial visualization  enhanced with intravenous injection of Ultrasonic Enhancing  Agent (Definity).The mid-apical anterior wall, mid to  apical anterolateral wall, all apical segments and septum  are severely hypokinetic to akinetic.  The inferior wal is  hypokinetic. 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 No thrombus  is seen.  Alcala's done on 119 and 126; EF 19%  2. Moderate diastolic dysfunction (Stage II). Increased  E/e'  is consistent with elevated left ventricular filling  pressure.  3. Normal right ventricular size and borderline RV  function. RV S' 9.57cm/s  4. Normal tricuspid valve. Mild-moderate tricuspid  regurgitation.      Interval history: complete bumax desensitization   0.5mg IVP x2   and received additional dose Bumax 2mg IVP this morning   Voids 200-300cc/hr Overall Net negative ~1.8L     REVIEW OF SYSTEMS  Denies CP, Palpitation, SOB, Dyspnea [ x ] All other systems negative    MEDICATIONS  (STANDING)  aspirin  chewable 81 milliGRAM(s) Oral daily  atorvastatin 40 milliGRAM(s) Oral daily  benzocaine/menthol Lozenge 1 Lozenge Oral every 6 hours  chlorhexidine 2% Cloths 1 Application(s) Topical <User Schedule>  clopidogrel Tablet 75 milliGRAM(s) Oral daily  EPINEPHrine     1 mG/mL Injectable 0.3 milliGRAM(s) IntraMuscular once  losartan 12.5 milliGRAM(s) Oral daily  pantoprazole    Tablet 40 milliGRAM(s) Oral before breakfast  spironolactone 25 milliGRAM(s) Oral daily    MEDICATIONS  (PRN):  acetaminophen     Tablet .. 650 milliGRAM(s) Oral every 6 hours PRN Temp greater or equal to 38C (100.4F), Mild Pain (1 - 3)  albuterol/ipratropium for Nebulization. 3 milliLiter(s) Nebulizer once PRN Respiratory Distress  aluminum hydroxide/magnesium hydroxide/simethicone Suspension 30 milliLiter(s) Oral every 4 hours PRN Dyspepsia  diphenhydrAMINE 50 milliGRAM(s) Oral once PRN Rash and/or Itching  melatonin 3 milliGRAM(s) Oral at bedtime PRN Insomnia    PAST MEDICAL & SURGICAL HISTORY:  History of acute anterior wall MI  No significant past surgical history    FAMILY HISTORY:  No pertinent family history in first degree relatives    Allergy   Lasix (Rash (Mod to Severe))    ICU Vital Signs Last 24 Hrs  T(C): 37 (Max: 37.1)  HR: 91 (89 - 111)  BP: 90/50  (78/50 - 106/61)  BP(mean): 65  (56 - 77)  RR: 18 (17 - 22)  SpO2: 98% (91% - 100%) on room air     I&O's Summary  IN: 628 mL / OUT: 2500 mL / NET: -1872 mL    PHYSICAL EXAM  Appearance: Normal, NAD  HEAD:  Atraumatic, Normocephalic  EYES: EOMI, PERRLA, conjunctiva and sclera clear  NECK: Supple, No JVD  CHEST/LUNG: Clear to auscultation bilaterally; No wheezing  HEART: Normal S1, S2. No murmurs, rubs, or gallops  ABDOMEN: + Bowel sounds, Soft, NT, ND   EXTREMITIES:  2+ Peripheral Pulses, No clubbing, cyanosis, or edema  NEUROLOGY: non-focal, aaox3  SKIN: No rashes or lesions    Interpretation of Telemetry:                          11.0   9.00  )-----------( 448              35.2       141  |  102  |  24<H>  --------------------------<  101<H>  3.5 (rEPLETED)  |  27  |  0.93    Ca    9.3     Phos  4.3     Mg     2.2    TPro  7.4  /  Alb  3.4  /  TBili  0.6  /  DBili  x   /  AST  12  /  ALT  9<L>  /  AlkPhos  110

## 2023-02-17 NOTE — PROVIDER CONTACT NOTE (OTHER) - ACTION/TREATMENT ORDERED:
Team MD notified. Team MD notified and evaluated patient at bedside. 12 lead EKG. orthostatic blood pressure.

## 2023-02-17 NOTE — PROGRESS NOTE ADULT - THIS PATIENT HAS THE FOLLOWING CONDITION(S)/DIAGNOSES ON THIS ADMISSION:
bumax desensitization
Heart Failure
Cheek Interpolation Flap Division And Inset Text: Division and inset of the cheek interpolation flap was performed to achieve optimal aesthetic result, restore normal anatomic appearance and avoid distortion of normal anatomy, expedite and facilitate wound healing, achieve optimal functional result and because linear closure either not possible or would produce suboptimal result. The patient was prepped and draped in the usual manner. The pedicle was infiltrated with local anesthesia. The pedicle was sectioned with a #15 blade. The pedicle was de-bulked and trimmed to match the shape of the defect. Hemostasis was achieved. The flap donor site and free margin of the flap were secured with deep buried sutures and the wound edges were re-approximated.

## 2023-02-17 NOTE — PROGRESS NOTE ADULT - ASSESSMENT
67F recently admitted to [facility] with late presentation anterior MI (Jan 2022), CAD w/ 100% pLAD and HFrEF presenting with SOB, orthopnea and abdominal distention likely in setting of worsening EF and acute-on-chronic HFrEF. Patient was in CICU for desensitization given prior allergic reaction to Lasix and now tolerating Bumex.

## 2023-02-17 NOTE — PROGRESS NOTE ADULT - ASSESSMENT
67F with recent late presenting STEMI without prox LAD (pLAD 100%, mCx 50%, OM2 40%), HFrEF 35% now 19%, presenting with dyspnea, abdominal pain, with recent outpatient visit with Dr. Baron found to be volume overloaded, started on lasix but with allergy.  Allergy was consulted for bumex desensitization. Pt transferred to the CICU for hemodynamic monitoring while receiving bumex desensitization.     ====================== NEUROLOGY=====================  A and O x 4  - continue to monitor mental status per protocol   - Ambulate as tolerated    ==================== RESPIRATORY======================  comfortable on room air  - Monitor SpO2 with goal >94%     ====================CARDIOVASCULAR==================  CHF exacerbation.   - continue Losartan 12.5 mg  - Cont dulce 25 mg   - finishing bumex desensitization today  - BB has been held 2/2 bumex desensitization   -Daily weights, I/Os, Mg >2, K > 4    CAD , non-revascularized  - Continue ASA, plavix, lipitor.   -  GDMT as above     Allergy  - Allergic rx to lasix  - bumex desensitization per allergy team as below  - received full dose of bumex at 4 PM   - allergy following      ===================HEMATOLOGIC/ONC ===================  Microcytic Anemia, stable   - continue to monitor H/H  - Patient refusing DVT PPX. Continue SCDs    ===================== RENAL =========================  Scr wnl  - continue to monitor SCr, BUN, and lytes  - Monitor I&Os    ==================== GASTROINTESTINAL===================  - continue with DASH diet  - monitor for reg BM while inpatient      =======================    ENDOCRINE  =====================  No active issues  - gluc well controlled. Monitor BG with goal 100-180     ========================INFECTIOUS DISEASE================  Afebrile, no leukocytosis    - monitor wbc and temp curve   - continue to monitor off abx

## 2023-02-17 NOTE — PROGRESS NOTE ADULT - SUBJECTIVE AND OBJECTIVE BOX
Interval History:   Bumex 2 mg PO at 4 pm without reaction     Medications:  acetaminophen     Tablet .. 650 milliGRAM(s) Oral every 6 hours PRN  albuterol/ipratropium for Nebulization. 3 milliLiter(s) Nebulizer once PRN  aluminum hydroxide/magnesium hydroxide/simethicone Suspension 30 milliLiter(s) Oral every 4 hours PRN  aspirin  chewable 81 milliGRAM(s) Oral daily  atorvastatin 40 milliGRAM(s) Oral daily  benzocaine/menthol Lozenge 1 Lozenge Oral every 6 hours  chlorhexidine 2% Cloths 1 Application(s) Topical <User Schedule>  clopidogrel Tablet 75 milliGRAM(s) Oral daily  diphenhydrAMINE 50 milliGRAM(s) Oral once PRN  EPINEPHrine     1 mG/mL Injectable 0.3 milliGRAM(s) IntraMuscular once  losartan 12.5 milliGRAM(s) Oral daily  melatonin 3 milliGRAM(s) Oral at bedtime PRN  pantoprazole    Tablet 40 milliGRAM(s) Oral before breakfast  spironolactone 25 milliGRAM(s) Oral daily      Vitals:  T(C): 37 (23 @ 04:00), Max: 37.1 (23 @ 11:00)  HR: 91 (23 @ 07:00) (89 - 111)  BP: 90/50 (23 @ 07:00) (78/50 - 106/61)  BP(mean): 65 (23 @ 07:00) (56 - 77)  RR: 18 (23 @ 05:00) (17 - 22)  SpO2: 98% (23 @ 07:00) (91% - 100%)    Daily     Daily Weight in k.7 (2023 06:00)        I&O's Summary    2023 07:01  -  2023 07:00  --------------------------------------------------------  IN: 628 mL / OUT: 2500 mL / NET: -1872 mL        Physical Exam:  AGENERAL: No acute distress, well-developed  HEAD:  Atraumatic, Normocephalic  ENT: improved JVP    CHEST/LUNG: decreased breath sounds at bases   BACK: No spinal tenderness  HEART: Regular rate and rhythm; No murmurs, rubs, or gallops  ABDOMEN: Soft, Nontender, Nondistended; Bowel sounds present  EXTREMITIES:  trace edema   PSYCH: Nl behavior, nl affect  NEUROLOGY: AAOx3, non-focal, cranial nerves intact  SKIN: Normal color, No rashes or lesions    Labs:                        11.0   9.00  )-----------( 448      ( 2023 04:14 )             35.2         141  |  102  |  24<H>  ----------------------------<  101<H>  3.5   |  27  |  0.93    Ca    9.3      2023 04:10  Phos  4.3       Mg     2.2         TPro  7.4  /  Alb  3.4  /  TBili  0.6  /  DBili  x   /  AST  12  /  ALT  9<L>  /  AlkPhos  110             Interval History:   Bumex 2 mg PO at 4 pm without reaction     Medications:  acetaminophen     Tablet .. 650 milliGRAM(s) Oral every 6 hours PRN  albuterol/ipratropium for Nebulization. 3 milliLiter(s) Nebulizer once PRN  aluminum hydroxide/magnesium hydroxide/simethicone Suspension 30 milliLiter(s) Oral every 4 hours PRN  aspirin  chewable 81 milliGRAM(s) Oral daily  atorvastatin 40 milliGRAM(s) Oral daily  benzocaine/menthol Lozenge 1 Lozenge Oral every 6 hours  chlorhexidine 2% Cloths 1 Application(s) Topical <User Schedule>  clopidogrel Tablet 75 milliGRAM(s) Oral daily  diphenhydrAMINE 50 milliGRAM(s) Oral once PRN  EPINEPHrine     1 mG/mL Injectable 0.3 milliGRAM(s) IntraMuscular once  losartan 12.5 milliGRAM(s) Oral daily  melatonin 3 milliGRAM(s) Oral at bedtime PRN  pantoprazole    Tablet 40 milliGRAM(s) Oral before breakfast  spironolactone 25 milliGRAM(s) Oral daily      Vitals:  T(C): 37 (23 @ 04:00), Max: 37.1 (23 @ 11:00)  HR: 91 (23 @ 07:00) (89 - 111)  BP: 90/50 (23 @ 07:00) (78/50 - 106/61)  BP(mean): 65 (23 @ 07:00) (56 - 77)  RR: 18 (23 @ 05:00) (17 - 22)  SpO2: 98% (23 @ 07:00) (91% - 100%)    Daily     Daily Weight in k.7 (2023 06:00)        I&O's Summary    2023 07:01  -  2023 07:00  --------------------------------------------------------  IN: 628 mL / OUT: 2500 mL / NET: -1872 mL        Physical Exam:  GENERAL: No acute distress, well-developed  HEAD:  Atraumatic, Normocephalic  ENT: improved JVP    CHEST/LUNG: decreased breath sounds at bases   BACK: No spinal tenderness  HEART: Regular rate and rhythm; No murmurs, rubs, or gallops  ABDOMEN: Soft, Nontender, Nondistended; Bowel sounds present  EXTREMITIES:  trace edema   PSYCH: Nl behavior, nl affect  NEUROLOGY: AAOx3, non-focal, cranial nerves intact  SKIN: Normal color, No rashes or lesions    Labs:                        11.0   9.00  )-----------( 448      ( 2023 04:14 )             35.2         141  |  102  |  24<H>  ----------------------------<  101<H>  3.5   |  27  |  0.93    Ca    9.3      2023 04:10  Phos  4.3       Mg     2.2         TPro  7.4  /  Alb  3.4  /  TBili  0.6  /  DBili  x   /  AST  12  /  ALT  9<L>  /  AlkPhos  110             Interval History:   Bumex 2 mg PO at 4 pm without reaction     Medications:  acetaminophen     Tablet .. 650 milliGRAM(s) Oral every 6 hours PRN  albuterol/ipratropium for Nebulization. 3 milliLiter(s) Nebulizer once PRN  aluminum hydroxide/magnesium hydroxide/simethicone Suspension 30 milliLiter(s) Oral every 4 hours PRN  aspirin  chewable 81 milliGRAM(s) Oral daily  atorvastatin 40 milliGRAM(s) Oral daily  benzocaine/menthol Lozenge 1 Lozenge Oral every 6 hours  chlorhexidine 2% Cloths 1 Application(s) Topical <User Schedule>  clopidogrel Tablet 75 milliGRAM(s) Oral daily  diphenhydrAMINE 50 milliGRAM(s) Oral once PRN  EPINEPHrine     1 mG/mL Injectable 0.3 milliGRAM(s) IntraMuscular once  losartan 12.5 milliGRAM(s) Oral daily  melatonin 3 milliGRAM(s) Oral at bedtime PRN  pantoprazole    Tablet 40 milliGRAM(s) Oral before breakfast  spironolactone 25 milliGRAM(s) Oral daily      Vitals:  T(C): 37 (23 @ 04:00), Max: 37.1 (23 @ 11:00)  HR: 91 (23 @ 07:00) (89 - 111)  BP: 90/50 (23 @ 07:00) (78/50 - 106/61)  BP(mean): 65 (23 @ 07:00) (56 - 77)  RR: 18 (23 @ 05:00) (17 - 22)  SpO2: 98% (23 @ 07:00) (91% - 100%)    Daily     Daily Weight in k.7 (2023 06:00)        I&O's Summary    2023 07:01  -  2023 07:00  --------------------------------------------------------  IN: 628 mL / OUT: 2500 mL / NET: -1872 mL    Physical Exam:  GENERAL: No acute distress, well-developed  HEAD:  Atraumatic, Normocephalic  ENT: improved JVP    CHEST/LUNG: decreased breath sounds at bases   BACK: No spinal tenderness  HEART: Regular rate and rhythm; No murmurs, rubs, or gallops  ABDOMEN: Soft, Nontender, Nondistended; Bowel sounds present  EXTREMITIES:  trace edema   PSYCH: Nl behavior, nl affect  NEUROLOGY: AAOx3, non-focal, cranial nerves intact  SKIN: Normal color, No rashes or lesions    Labs:                        11.0   9.00  )-----------( 448      ( 2023 04:14 )             35.2         141  |  102  |  24<H>  ----------------------------<  101<H>  3.5   |  27  |  0.93    Ca    9.3      2023 04:10  Phos  4.3       Mg     2.2         TPro  7.4  /  Alb  3.4  /  TBili  0.6  /  DBili  x   /  AST  12  /  ALT  9<L>  /  AlkPhos  110

## 2023-02-17 NOTE — PROGRESS NOTE ADULT - PROBLEM SELECTOR PLAN 1
Assessment:  - HFrEF 2/2 ICM (late presentation anterior MI 1/21/23 follows w/ Dr. Bradley Baron)  - TTE 1/22/23: EF 41%, distal septum, anterolateral wall akinetic  --> repeat TTE with EF 17%  - strict I&Os, standing daily weights, replete lytes K > 4, Mg > 2  - Optimization per HF  - Currently on Aldactone and Losartan (Entresto stopped due to hypotension)  - Plan to restart metoprolol per HF  - Diuresis per HF (tolerated Bumex)

## 2023-02-17 NOTE — PROGRESS NOTE ADULT - PROBLEM SELECTOR PLAN 1
-Daily weights, I/Os, Mg >2, K > 4  -stop ethacrynic acid, c/w bumex PO  -stop losartan 12.5 tomorrow   -spironolactone 25 qd  -Will eventually need initiation of BB     hold parameters of SBP <90 for medications -Daily weights, I/Os, Mg >2, K > 4  -c/w bumex PO  -c/w losartan 12.5 daily   -spironolactone 25 qd  -Will eventually need initiation of BB     hold parameters of SBP <90 for medications

## 2023-02-17 NOTE — PROGRESS NOTE ADULT - PROBLEM SELECTOR PLAN 2
- [facility]  1/22/23: pLAD 100%, mCx 50%, OM2 40%  - Continue with Aspirin & Plavix  - Continue with Lipitor 40 mg

## 2023-02-18 LAB
ALBUMIN SERPL ELPH-MCNC: 3.4 G/DL — SIGNIFICANT CHANGE UP (ref 3.3–5)
ALP SERPL-CCNC: 110 U/L — SIGNIFICANT CHANGE UP (ref 40–120)
ALT FLD-CCNC: 9 U/L — LOW (ref 10–45)
ANION GAP SERPL CALC-SCNC: 13 MMOL/L — SIGNIFICANT CHANGE UP (ref 5–17)
AST SERPL-CCNC: 13 U/L — SIGNIFICANT CHANGE UP (ref 10–40)
BILIRUB SERPL-MCNC: 0.6 MG/DL — SIGNIFICANT CHANGE UP (ref 0.2–1.2)
BUN SERPL-MCNC: 22 MG/DL — SIGNIFICANT CHANGE UP (ref 7–23)
CALCIUM SERPL-MCNC: 9.2 MG/DL — SIGNIFICANT CHANGE UP (ref 8.4–10.5)
CHLORIDE SERPL-SCNC: 103 MMOL/L — SIGNIFICANT CHANGE UP (ref 96–108)
CO2 SERPL-SCNC: 22 MMOL/L — SIGNIFICANT CHANGE UP (ref 22–31)
CREAT SERPL-MCNC: 0.78 MG/DL — SIGNIFICANT CHANGE UP (ref 0.5–1.3)
EGFR: 84 ML/MIN/1.73M2 — SIGNIFICANT CHANGE UP
GLUCOSE SERPL-MCNC: 101 MG/DL — HIGH (ref 70–99)
HCT VFR BLD CALC: 35.1 % — SIGNIFICANT CHANGE UP (ref 34.5–45)
HGB BLD-MCNC: 10.8 G/DL — LOW (ref 11.5–15.5)
MAGNESIUM SERPL-MCNC: 2.5 MG/DL — SIGNIFICANT CHANGE UP (ref 1.6–2.6)
MCHC RBC-ENTMCNC: 27.9 PG — SIGNIFICANT CHANGE UP (ref 27–34)
MCHC RBC-ENTMCNC: 30.8 GM/DL — LOW (ref 32–36)
MCV RBC AUTO: 90.7 FL — SIGNIFICANT CHANGE UP (ref 80–100)
NRBC # BLD: 0 /100 WBCS — SIGNIFICANT CHANGE UP (ref 0–0)
PHOSPHATE SERPL-MCNC: 3.4 MG/DL — SIGNIFICANT CHANGE UP (ref 2.5–4.5)
PLATELET # BLD AUTO: 488 K/UL — HIGH (ref 150–400)
POTASSIUM SERPL-MCNC: 4.3 MMOL/L — SIGNIFICANT CHANGE UP (ref 3.5–5.3)
POTASSIUM SERPL-SCNC: 4.3 MMOL/L — SIGNIFICANT CHANGE UP (ref 3.5–5.3)
PROT SERPL-MCNC: 7.3 G/DL — SIGNIFICANT CHANGE UP (ref 6–8.3)
RBC # BLD: 3.87 M/UL — SIGNIFICANT CHANGE UP (ref 3.8–5.2)
RBC # FLD: 15.2 % — HIGH (ref 10.3–14.5)
SODIUM SERPL-SCNC: 138 MMOL/L — SIGNIFICANT CHANGE UP (ref 135–145)
WBC # BLD: 10.41 K/UL — SIGNIFICANT CHANGE UP (ref 3.8–10.5)
WBC # FLD AUTO: 10.41 K/UL — SIGNIFICANT CHANGE UP (ref 3.8–10.5)

## 2023-02-18 PROCEDURE — 99233 SBSQ HOSP IP/OBS HIGH 50: CPT | Mod: GC

## 2023-02-18 RX ADMIN — CHLORHEXIDINE GLUCONATE 1 APPLICATION(S): 213 SOLUTION TOPICAL at 05:47

## 2023-02-18 RX ADMIN — Medication 30 MILLILITER(S): at 00:13

## 2023-02-18 RX ADMIN — SPIRONOLACTONE 25 MILLIGRAM(S): 25 TABLET, FILM COATED ORAL at 05:20

## 2023-02-18 RX ADMIN — ATORVASTATIN CALCIUM 40 MILLIGRAM(S): 80 TABLET, FILM COATED ORAL at 12:23

## 2023-02-18 RX ADMIN — BENZOCAINE AND MENTHOL 1 LOZENGE: 5; 1 LIQUID ORAL at 05:21

## 2023-02-18 RX ADMIN — Medication 30 MILLILITER(S): at 15:21

## 2023-02-18 RX ADMIN — BUMETANIDE 2 MILLIGRAM(S): 0.25 INJECTION INTRAMUSCULAR; INTRAVENOUS at 05:20

## 2023-02-18 RX ADMIN — CLOPIDOGREL BISULFATE 75 MILLIGRAM(S): 75 TABLET, FILM COATED ORAL at 12:23

## 2023-02-18 RX ADMIN — Medication 30 MILLILITER(S): at 05:24

## 2023-02-18 RX ADMIN — Medication 81 MILLIGRAM(S): at 12:23

## 2023-02-18 NOTE — PROGRESS NOTE ADULT - ASSESSMENT
67F recently admitted to [facility] with late presentation anterior MI (Jan 2022), CAD w/ 100% pLAD and HFrEF presenting with SOB, orthopnea and abdominal distention likely in setting of worsening EF and acute-on-chronic HFrEF. Patient was in CICU for desensitization given prior allergic reaction to Lasix and now tolerating Bumex.

## 2023-02-18 NOTE — PROGRESS NOTE ADULT - ATTENDING COMMENTS
Hypotensive today, despite not taking losartan 12.5mg today. Continue bumex 2mg qd. F/u heart failure recs.

## 2023-02-18 NOTE — PROGRESS NOTE ADULT - PROBLEM SELECTOR PLAN 2
- [facility]  1/22/23: pLAD 100%, mCx 50%, OM2 40%  - Continue with Aspirin & Plavix  - Continue with Lipitor 40 mg

## 2023-02-18 NOTE — PROGRESS NOTE ADULT - SUBJECTIVE AND OBJECTIVE BOX
PROGRESS NOTE:   Authored by Priscila Baron MD  Internal Medicine      Patient is a 67y old  Female who presents with a chief complaint of Shortness of breath (17 Feb 2023 08:10)      SUBJECTIVE / OVERNIGHT EVENTS: NAEO, patient seen and examined at bedside    MEDICATIONS  (STANDING):  aspirin  chewable 81 milliGRAM(s) Oral daily  atorvastatin 40 milliGRAM(s) Oral daily  benzocaine/menthol Lozenge 1 Lozenge Oral every 6 hours  buMETAnide 2 milliGRAM(s) Oral daily  chlorhexidine 2% Cloths 1 Application(s) Topical <User Schedule>  clopidogrel Tablet 75 milliGRAM(s) Oral daily  losartan 12.5 milliGRAM(s) Oral daily  pantoprazole    Tablet 40 milliGRAM(s) Oral before breakfast  spironolactone 25 milliGRAM(s) Oral daily    MEDICATIONS  (PRN):  acetaminophen     Tablet .. 650 milliGRAM(s) Oral every 6 hours PRN Temp greater or equal to 38C (100.4F), Mild Pain (1 - 3)  aluminum hydroxide/magnesium hydroxide/simethicone Suspension 30 milliLiter(s) Oral every 4 hours PRN Dyspepsia  melatonin 3 milliGRAM(s) Oral at bedtime PRN Insomnia      CAPILLARY BLOOD GLUCOSE        I&O's Summary    17 Feb 2023 07:01  -  18 Feb 2023 07:00  --------------------------------------------------------  IN: 440 mL / OUT: 340 mL / NET: 100 mL        PHYSICAL EXAM:  Vital Signs Last 24 Hrs  T(C): 36.7 (18 Feb 2023 04:59), Max: 37 (17 Feb 2023 08:00)  T(F): 98 (18 Feb 2023 04:59), Max: 98.6 (17 Feb 2023 08:00)  HR: 93 (18 Feb 2023 04:59) (88 - 113)  BP: 102/62 (18 Feb 2023 04:59) (80/51 - 103/64)  BP(mean): 75 (17 Feb 2023 11:00) (61 - 75)  RR: 18 (18 Feb 2023 04:59) (16 - 18)  SpO2: 95% (18 Feb 2023 04:59) (94% - 98%)    Parameters below as of 18 Feb 2023 04:59  Patient On (Oxygen Delivery Method): room air      GEN: female in NAD, appears comfortable, no diaphoresis  EYES: No scleral injection, PERRL, EOMI  ENTM: neck supple & symmetric without tracheal deviation, moist membranes, no gross hearing impairment, thyroid gland not enlarged  CV: +S1/S2, no m/r/g, no abdominal bruit, non-pitting edema of feet  RESP: breathing comfortably, no respiratory accessory muscle use, CTAB, no w/r/r  GI: normoactive BS, soft, NTND, no rebounding/guarding, no palpable masses    LABS:                        10.8   10.41 )-----------( 488      ( 18 Feb 2023 06:52 )             35.1     02-17    141  |  102  |  24<H>  ----------------------------<  101<H>  3.5   |  27  |  0.93    Ca    9.3      17 Feb 2023 04:10  Phos  4.3     02-17  Mg     2.2     02-17    TPro  7.4  /  Alb  3.4  /  TBili  0.6  /  DBili  x   /  AST  12  /  ALT  9<L>  /  AlkPhos  110  02-17                RADIOLOGY & ADDITIONAL TESTS:  Results Reviewed:   Imaging Personally Reviewed:  Electrocardiogram Personally Reviewed:    COORDINATION OF CARE:  Care Discussed with Consultants/Other Providers [Y/N]:  Prior or Outpatient Records Reviewed [Y/N]:   PROGRESS NOTE:   Authored by Priscila Baron MD  Internal Medicine      Patient is a 67y old  Female who presents with a chief complaint of Shortness of breath (17 Feb 2023 08:10)      SUBJECTIVE / OVERNIGHT EVENTS: NAEO, patient seen and examined at bedside. declining losartan dose because feels dizzy. AM blood pressure 102/60s    MEDICATIONS  (STANDING):  aspirin  chewable 81 milliGRAM(s) Oral daily  atorvastatin 40 milliGRAM(s) Oral daily  benzocaine/menthol Lozenge 1 Lozenge Oral every 6 hours  buMETAnide 2 milliGRAM(s) Oral daily  chlorhexidine 2% Cloths 1 Application(s) Topical <User Schedule>  clopidogrel Tablet 75 milliGRAM(s) Oral daily  losartan 12.5 milliGRAM(s) Oral daily  pantoprazole    Tablet 40 milliGRAM(s) Oral before breakfast  spironolactone 25 milliGRAM(s) Oral daily    MEDICATIONS  (PRN):  acetaminophen     Tablet .. 650 milliGRAM(s) Oral every 6 hours PRN Temp greater or equal to 38C (100.4F), Mild Pain (1 - 3)  aluminum hydroxide/magnesium hydroxide/simethicone Suspension 30 milliLiter(s) Oral every 4 hours PRN Dyspepsia  melatonin 3 milliGRAM(s) Oral at bedtime PRN Insomnia      CAPILLARY BLOOD GLUCOSE        I&O's Summary    17 Feb 2023 07:01  -  18 Feb 2023 07:00  --------------------------------------------------------  IN: 440 mL / OUT: 340 mL / NET: 100 mL        PHYSICAL EXAM:  Vital Signs Last 24 Hrs  T(C): 36.7 (18 Feb 2023 04:59), Max: 37 (17 Feb 2023 08:00)  T(F): 98 (18 Feb 2023 04:59), Max: 98.6 (17 Feb 2023 08:00)  HR: 93 (18 Feb 2023 04:59) (88 - 113)  BP: 102/62 (18 Feb 2023 04:59) (80/51 - 103/64)  BP(mean): 75 (17 Feb 2023 11:00) (61 - 75)  RR: 18 (18 Feb 2023 04:59) (16 - 18)  SpO2: 95% (18 Feb 2023 04:59) (94% - 98%)    Parameters below as of 18 Feb 2023 04:59  Patient On (Oxygen Delivery Method): room air      GEN: female in NAD, appears fatigued but comfortable, no diaphoresis,  EYES: No scleral injection, PERRL  ENTM: neck supple & symmetric without tracheal deviation, moist membranes, no gross hearing impairment, thyroid gland not enlarged  CV: +S1/S2, no m/r/g, no YAN  RESP: breathing comfortably, no respiratory accessory muscle use, CTAB, no w/r/r  GI: normoactive BS, soft, NTND, no rebounding/guarding, no palpable masses    LABS:                        10.8   10.41 )-----------( 488      ( 18 Feb 2023 06:52 )             35.1     02-17    141  |  102  |  24<H>  ----------------------------<  101<H>  3.5   |  27  |  0.93    Ca    9.3      17 Feb 2023 04:10  Phos  4.3     02-17  Mg     2.2     02-17    TPro  7.4  /  Alb  3.4  /  TBili  0.6  /  DBili  x   /  AST  12  /  ALT  9<L>  /  AlkPhos  110  02-17                RADIOLOGY & ADDITIONAL TESTS:  Results Reviewed:   Imaging Personally Reviewed:  Electrocardiogram Personally Reviewed:    COORDINATION OF CARE:  Care Discussed with Consultants/Other Providers [Y/N]:  Prior or Outpatient Records Reviewed [Y/N]:

## 2023-02-19 LAB
ALBUMIN SERPL ELPH-MCNC: 3.6 G/DL — SIGNIFICANT CHANGE UP (ref 3.3–5)
ALP SERPL-CCNC: 116 U/L — SIGNIFICANT CHANGE UP (ref 40–120)
ALT FLD-CCNC: 14 U/L — SIGNIFICANT CHANGE UP (ref 10–45)
ANION GAP SERPL CALC-SCNC: 14 MMOL/L — SIGNIFICANT CHANGE UP (ref 5–17)
AST SERPL-CCNC: 16 U/L — SIGNIFICANT CHANGE UP (ref 10–40)
BILIRUB SERPL-MCNC: 0.7 MG/DL — SIGNIFICANT CHANGE UP (ref 0.2–1.2)
BUN SERPL-MCNC: 16 MG/DL — SIGNIFICANT CHANGE UP (ref 7–23)
CALCIUM SERPL-MCNC: 9.4 MG/DL — SIGNIFICANT CHANGE UP (ref 8.4–10.5)
CHLORIDE SERPL-SCNC: 99 MMOL/L — SIGNIFICANT CHANGE UP (ref 96–108)
CO2 SERPL-SCNC: 23 MMOL/L — SIGNIFICANT CHANGE UP (ref 22–31)
CREAT SERPL-MCNC: 0.83 MG/DL — SIGNIFICANT CHANGE UP (ref 0.5–1.3)
EGFR: 78 ML/MIN/1.73M2 — SIGNIFICANT CHANGE UP
GLUCOSE SERPL-MCNC: 119 MG/DL — HIGH (ref 70–99)
HCT VFR BLD CALC: 36.6 % — SIGNIFICANT CHANGE UP (ref 34.5–45)
HGB BLD-MCNC: 11.5 G/DL — SIGNIFICANT CHANGE UP (ref 11.5–15.5)
MAGNESIUM SERPL-MCNC: 2.4 MG/DL — SIGNIFICANT CHANGE UP (ref 1.6–2.6)
MCHC RBC-ENTMCNC: 28.5 PG — SIGNIFICANT CHANGE UP (ref 27–34)
MCHC RBC-ENTMCNC: 31.4 GM/DL — LOW (ref 32–36)
MCV RBC AUTO: 90.8 FL — SIGNIFICANT CHANGE UP (ref 80–100)
NRBC # BLD: 0 /100 WBCS — SIGNIFICANT CHANGE UP (ref 0–0)
PHOSPHATE SERPL-MCNC: 3.6 MG/DL — SIGNIFICANT CHANGE UP (ref 2.5–4.5)
PLATELET # BLD AUTO: 467 K/UL — HIGH (ref 150–400)
POTASSIUM SERPL-MCNC: 4 MMOL/L — SIGNIFICANT CHANGE UP (ref 3.5–5.3)
POTASSIUM SERPL-SCNC: 4 MMOL/L — SIGNIFICANT CHANGE UP (ref 3.5–5.3)
PROT SERPL-MCNC: 7.7 G/DL — SIGNIFICANT CHANGE UP (ref 6–8.3)
RBC # BLD: 4.03 M/UL — SIGNIFICANT CHANGE UP (ref 3.8–5.2)
RBC # FLD: 14.9 % — HIGH (ref 10.3–14.5)
SODIUM SERPL-SCNC: 136 MMOL/L — SIGNIFICANT CHANGE UP (ref 135–145)
WBC # BLD: 8.39 K/UL — SIGNIFICANT CHANGE UP (ref 3.8–10.5)
WBC # FLD AUTO: 8.39 K/UL — SIGNIFICANT CHANGE UP (ref 3.8–10.5)

## 2023-02-19 PROCEDURE — 99232 SBSQ HOSP IP/OBS MODERATE 35: CPT | Mod: GC

## 2023-02-19 RX ADMIN — BUMETANIDE 2 MILLIGRAM(S): 0.25 INJECTION INTRAMUSCULAR; INTRAVENOUS at 05:51

## 2023-02-19 RX ADMIN — SPIRONOLACTONE 25 MILLIGRAM(S): 25 TABLET, FILM COATED ORAL at 05:51

## 2023-02-19 RX ADMIN — CHLORHEXIDINE GLUCONATE 1 APPLICATION(S): 213 SOLUTION TOPICAL at 05:51

## 2023-02-19 RX ADMIN — Medication 3 MILLIGRAM(S): at 23:18

## 2023-02-19 RX ADMIN — Medication 30 MILLILITER(S): at 05:50

## 2023-02-19 RX ADMIN — Medication 30 MILLILITER(S): at 20:21

## 2023-02-19 RX ADMIN — Medication 81 MILLIGRAM(S): at 11:13

## 2023-02-19 RX ADMIN — Medication 30 MILLILITER(S): at 01:50

## 2023-02-19 RX ADMIN — CLOPIDOGREL BISULFATE 75 MILLIGRAM(S): 75 TABLET, FILM COATED ORAL at 11:12

## 2023-02-19 RX ADMIN — Medication 30 MILLILITER(S): at 14:30

## 2023-02-19 RX ADMIN — ATORVASTATIN CALCIUM 40 MILLIGRAM(S): 80 TABLET, FILM COATED ORAL at 11:13

## 2023-02-19 NOTE — PROGRESS NOTE ADULT - PROBLEM SELECTOR PLAN 4
- DVT ppx: Patient declines  - Diet: DASH  - Dispo: Home - DVT ppx: Patient declines SCDs;  - Diet: DASH  - Dispo: Home

## 2023-02-19 NOTE — PROGRESS NOTE ADULT - PROBLEM SELECTOR PLAN 1
Assessment:  - HFrEF 2/2 ICM (late presentation anterior MI 1/21/23 follows w/ Dr. Bradley Baron)  - TTE 1/22/23: EF 41%, distal septum, anterolateral wall akinetic  --> repeat TTE with EF 17%  - strict I&Os, standing daily weights, replete lytes K > 4, Mg > 2  - Optimization per HF  - Currently on Aldactone and Losartan (Entresto stopped due to hypotension)  - Plan to restart metoprolol per HF  - Diuresis per HF (tolerated Bumex) Assessment:  - HFrEF 2/2 ICM (late presentation anterior MI 1/21/23 follows w/ Dr. Bradley Baron)  - TTE 1/22/23: EF 41%, distal septum, anterolateral wall akinetic  --> repeat TTE with EF 17%  - strict I&Os, standing daily weights, replete lytes K > 4, Mg > 2  - Optimization per HF  - Currently on Aldactone; refusing losartan (Entresto stopped due to hypotension)  - Plan to restart metoprolol per HF  - Diuresis per HF (tolerated Bumex)

## 2023-02-19 NOTE — PROGRESS NOTE ADULT - SUBJECTIVE AND OBJECTIVE BOX
INCOMPLETE NOTE    Bulmaro Coats | PGY1| Pager: [phone]  Interval Events:    REVIEW OF SYSTEMS:  CONSTITUTIONAL: No weakness, fevers or chills  EYES/ENT: No visual changes;  No vertigo or throat pain   NECK: No pain or stiffness  RESPIRATORY: No cough, wheezing, hemoptysis; No shortness of breath  CARDIOVASCULAR: No chest pain or palpitations  GASTROINTESTINAL: No abdominal or epigastric pain. No nausea, vomiting, or hematemesis; No diarrhea or constipation. No melena or hematochezia.  GENITOURINARY: No dysuria, frequency or hematuria  NEUROLOGICAL: No numbness or weakness  SKIN: No itching, burning, rashes, or lesions   All other review of systems is negative unless indicated above.    OBJECTIVE:  ICU Vital Signs Last 24 Hrs  T(C): 36.7 (19 Feb 2023 04:31), Max: 36.7 (18 Feb 2023 20:40)  T(F): 98.1 (19 Feb 2023 04:31), Max: 98.1 (18 Feb 2023 20:40)  HR: 96 (19 Feb 2023 04:31) (88 - 96)  BP: 97/68 (19 Feb 2023 04:31) (89/59 - 99/67)  BP(mean): --  ABP: --  ABP(mean): --  RR: 18 (19 Feb 2023 04:31) (17 - 18)  SpO2: 97% (19 Feb 2023 04:31) (94% - 98%)    O2 Parameters below as of 19 Feb 2023 04:31  Patient On (Oxygen Delivery Method): room air              02-18 @ 07:01  -  02-19 @ 07:00  --------------------------------------------------------  IN: 720 mL / OUT: 0 mL / NET: 720 mL      CAPILLARY BLOOD GLUCOSE          PHYSICAL EXAM:  General: WN/WD NAD  Neurology: A&Ox3, nonfocal, MONREAL x 4  Eyes: PERRLA/ EOMI, Gross vision intact  ENT/Neck: Neck supple, trachea midline, No JVD, Gross hearing intact  Respiratory: CTA B/L, No wheezing, rales, rhonchi  CV: RRR, +S1/S2, -S3/S4, no murmurs, rubs or gallops  Abdominal: Soft, NT, ND +BS, No HSM  MSK: 5/5 strength UE/LE bilaterally  Extremities: No edema, 2+ peripheral pulses  Skin: No Rashes, Hematoma, Ecchymosis  Incisions:   Tubes:    HOSPITAL MEDICATIONS:  MEDICATIONS  (STANDING):  aspirin  chewable 81 milliGRAM(s) Oral daily  atorvastatin 40 milliGRAM(s) Oral daily  benzocaine/menthol Lozenge 1 Lozenge Oral every 6 hours  buMETAnide 2 milliGRAM(s) Oral daily  chlorhexidine 2% Cloths 1 Application(s) Topical <User Schedule>  clopidogrel Tablet 75 milliGRAM(s) Oral daily  losartan 12.5 milliGRAM(s) Oral daily  pantoprazole    Tablet 40 milliGRAM(s) Oral before breakfast  spironolactone 25 milliGRAM(s) Oral daily    MEDICATIONS  (PRN):  acetaminophen     Tablet .. 650 milliGRAM(s) Oral every 6 hours PRN Temp greater or equal to 38C (100.4F), Mild Pain (1 - 3)  aluminum hydroxide/magnesium hydroxide/simethicone Suspension 30 milliLiter(s) Oral every 4 hours PRN Dyspepsia  melatonin 3 milliGRAM(s) Oral at bedtime PRN Insomnia      LABS:                        11.5   8.39  )-----------( 467      ( 19 Feb 2023 06:34 )             36.6     Hgb Trend: 11.5<--, 10.8<--, 11.0<--, 10.1<--, 10.0<--  02-19    136  |  99  |  16  ----------------------------<  119<H>  4.0   |  23  |  0.83    Ca    9.4      19 Feb 2023 06:33  Phos  3.6     02-19  Mg     2.4     02-19    TPro  7.7  /  Alb  3.6  /  TBili  0.7  /  DBili  x   /  AST  16  /  ALT  14  /  AlkPhos  116  02-19    Creatinine Trend: 0.83<--, 0.78<--, 0.93<--, 0.89<--, 0.88<--, 0.80<--        Venous Blood Gas:  02-17 @ 10:34  7.45/46/49/32/79.1  VBG Lactate: 1.4      MICROBIOLOGY:          Bulmaro Coats | PGY1| Pager: [phone]  Interval Events: NAEON; patient with refuses losartan for past 2 morning iso lightheadedness/dizziness when taking. Reports she prefers entresto; no acute complaints off of losartan    REVIEW OF SYSTEMS:  All other review of systems is negative unless indicated above.    OBJECTIVE:  ICU Vital Signs Last 24 Hrs  T(C): 36.7 (19 Feb 2023 04:31), Max: 36.7 (18 Feb 2023 20:40)  T(F): 98.1 (19 Feb 2023 04:31), Max: 98.1 (18 Feb 2023 20:40)  HR: 96 (19 Feb 2023 04:31) (88 - 96)  BP: 97/68 (19 Feb 2023 04:31) (89/59 - 99/67)  BP(mean): --  ABP: --  ABP(mean): --  RR: 18 (19 Feb 2023 04:31) (17 - 18)  SpO2: 97% (19 Feb 2023 04:31) (94% - 98%)    O2 Parameters below as of 19 Feb 2023 04:31  Patient On (Oxygen Delivery Method): room air              02-18 @ 07:01  -  02-19 @ 07:00  --------------------------------------------------------  IN: 720 mL / OUT: 0 mL / NET: 720 mL      CAPILLARY BLOOD GLUCOSE          PHYSICAL EXAM:  General: NAD  Neurology: A&Ox3, nonfocal, MONREAL x 4  ENT/Neck: Neck supple, trachea midline, JVD lower neck while sitting at 45 degrees  Respiratory: CTA B/L, No wheezing, rales, rhonchi  CV: RRR, +S1/S2, -S3/S4, no murmurs, rubs or gallops  Abdominal: Soft, NT, ND +BS, No HSM  Extremities: No edema, 2+ peripheral pulses  Skin: No Rashes, Hematoma, Ecchymosis      HOSPITAL MEDICATIONS:  MEDICATIONS  (STANDING):  aspirin  chewable 81 milliGRAM(s) Oral daily  atorvastatin 40 milliGRAM(s) Oral daily  benzocaine/menthol Lozenge 1 Lozenge Oral every 6 hours  buMETAnide 2 milliGRAM(s) Oral daily  chlorhexidine 2% Cloths 1 Application(s) Topical <User Schedule>  clopidogrel Tablet 75 milliGRAM(s) Oral daily  losartan 12.5 milliGRAM(s) Oral daily  pantoprazole    Tablet 40 milliGRAM(s) Oral before breakfast  spironolactone 25 milliGRAM(s) Oral daily    MEDICATIONS  (PRN):  acetaminophen     Tablet .. 650 milliGRAM(s) Oral every 6 hours PRN Temp greater or equal to 38C (100.4F), Mild Pain (1 - 3)  aluminum hydroxide/magnesium hydroxide/simethicone Suspension 30 milliLiter(s) Oral every 4 hours PRN Dyspepsia  melatonin 3 milliGRAM(s) Oral at bedtime PRN Insomnia      LABS:                        11.5   8.39  )-----------( 467      ( 19 Feb 2023 06:34 )             36.6     Hgb Trend: 11.5<--, 10.8<--, 11.0<--, 10.1<--, 10.0<--  02-19    136  |  99  |  16  ----------------------------<  119<H>  4.0   |  23  |  0.83    Ca    9.4      19 Feb 2023 06:33  Phos  3.6     02-19  Mg     2.4     02-19    TPro  7.7  /  Alb  3.6  /  TBili  0.7  /  DBili  x   /  AST  16  /  ALT  14  /  AlkPhos  116  02-19    Creatinine Trend: 0.83<--, 0.78<--, 0.93<--, 0.89<--, 0.88<--, 0.80<--        Venous Blood Gas:  02-17 @ 10:34  7.45/46/49/32/79.1  VBG Lactate: 1.4      MICROBIOLOGY:

## 2023-02-19 NOTE — PROGRESS NOTE ADULT - PROBLEM SELECTOR PLAN 2
- [facility]  1/22/23: pLAD 100%, mCx 50%, OM2 40%  - Continue with Aspirin & Plavix  - Continue with Lipitor 40 mg

## 2023-02-19 NOTE — PROGRESS NOTE ADULT - ATTENDING COMMENTS
67F recently admitted to [facility] with late presentation anterior MI (Jan 2022), CAD w/ 100% pLAD and HFrEF presenting with SOB, orthopnea and abdominal distention likely in setting of worsening EF and acute-on-chronic HFrEF. Patient was in CICU for desensitization given prior allergic reaction to Lasix and now tolerating Bumex 2mg PO qd; patient has been refusing losartan due to worsening dizziness while on the medication, pending further HF recs for GDMT optimization.    -Continue GDMT, tolerating bumex, refusing Losartan   -management as per HF   -daily weights+ strict I+O--> currently negative 3kg     Nehemias Ahmadi MD  Department of Hospital Medicine

## 2023-02-19 NOTE — PROGRESS NOTE ADULT - ASSESSMENT
67F recently admitted to [facility] with late presentation anterior MI (Jan 2022), CAD w/ 100% pLAD and HFrEF presenting with SOB, orthopnea and abdominal distention likely in setting of worsening EF and acute-on-chronic HFrEF. Patient was in CICU for desensitization given prior allergic reaction to Lasix and now tolerating Bumex. 67F recently admitted to [facility] with late presentation anterior MI (Jan 2022), CAD w/ 100% pLAD and HFrEF presenting with SOB, orthopnea and abdominal distention likely in setting of worsening EF and acute-on-chronic HFrEF. Patient was in CICU for desensitization given prior allergic reaction to Lasix and now tolerating Bumex 2mg PO qd; patient has been refusing losartan due to worsening dizziness while on the medication, pending further HF recs for GDMT optimization.

## 2023-02-20 LAB
ANION GAP SERPL CALC-SCNC: 13 MMOL/L — SIGNIFICANT CHANGE UP (ref 5–17)
BUN SERPL-MCNC: 16 MG/DL — SIGNIFICANT CHANGE UP (ref 7–23)
CALCIUM SERPL-MCNC: 9.5 MG/DL — SIGNIFICANT CHANGE UP (ref 8.4–10.5)
CHLORIDE SERPL-SCNC: 98 MMOL/L — SIGNIFICANT CHANGE UP (ref 96–108)
CO2 SERPL-SCNC: 25 MMOL/L — SIGNIFICANT CHANGE UP (ref 22–31)
CREAT SERPL-MCNC: 0.85 MG/DL — SIGNIFICANT CHANGE UP (ref 0.5–1.3)
EGFR: 76 ML/MIN/1.73M2 — SIGNIFICANT CHANGE UP
GLUCOSE SERPL-MCNC: 108 MG/DL — HIGH (ref 70–99)
HCT VFR BLD CALC: 36.3 % — SIGNIFICANT CHANGE UP (ref 34.5–45)
HGB BLD-MCNC: 11.4 G/DL — LOW (ref 11.5–15.5)
MAGNESIUM SERPL-MCNC: 2.8 MG/DL — HIGH (ref 1.6–2.6)
MCHC RBC-ENTMCNC: 27.9 PG — SIGNIFICANT CHANGE UP (ref 27–34)
MCHC RBC-ENTMCNC: 31.4 GM/DL — LOW (ref 32–36)
MCV RBC AUTO: 88.8 FL — SIGNIFICANT CHANGE UP (ref 80–100)
NRBC # BLD: 0 /100 WBCS — SIGNIFICANT CHANGE UP (ref 0–0)
PHOSPHATE SERPL-MCNC: 3.8 MG/DL — SIGNIFICANT CHANGE UP (ref 2.5–4.5)
PLATELET # BLD AUTO: 448 K/UL — HIGH (ref 150–400)
POTASSIUM SERPL-MCNC: 3.9 MMOL/L — SIGNIFICANT CHANGE UP (ref 3.5–5.3)
POTASSIUM SERPL-SCNC: 3.9 MMOL/L — SIGNIFICANT CHANGE UP (ref 3.5–5.3)
RBC # BLD: 4.09 M/UL — SIGNIFICANT CHANGE UP (ref 3.8–5.2)
RBC # FLD: 15 % — HIGH (ref 10.3–14.5)
SODIUM SERPL-SCNC: 136 MMOL/L — SIGNIFICANT CHANGE UP (ref 135–145)
WBC # BLD: 7.89 K/UL — SIGNIFICANT CHANGE UP (ref 3.8–10.5)
WBC # FLD AUTO: 7.89 K/UL — SIGNIFICANT CHANGE UP (ref 3.8–10.5)

## 2023-02-20 PROCEDURE — 99232 SBSQ HOSP IP/OBS MODERATE 35: CPT | Mod: GC

## 2023-02-20 RX ORDER — METOPROLOL TARTRATE 50 MG
12.5 TABLET ORAL
Refills: 0 | Status: DISCONTINUED | OUTPATIENT
Start: 2023-02-20 | End: 2023-02-20

## 2023-02-20 RX ORDER — POTASSIUM CHLORIDE 20 MEQ
20 PACKET (EA) ORAL ONCE
Refills: 0 | Status: COMPLETED | OUTPATIENT
Start: 2023-02-20 | End: 2023-02-20

## 2023-02-20 RX ORDER — BENZOCAINE AND MENTHOL 5; 1 G/100ML; G/100ML
1 LIQUID ORAL EVERY 6 HOURS
Refills: 0 | Status: DISCONTINUED | OUTPATIENT
Start: 2023-02-20 | End: 2023-02-21

## 2023-02-20 RX ORDER — METOPROLOL TARTRATE 50 MG
12.5 TABLET ORAL AT BEDTIME
Refills: 0 | Status: DISCONTINUED | OUTPATIENT
Start: 2023-02-20 | End: 2023-02-21

## 2023-02-20 RX ADMIN — ATORVASTATIN CALCIUM 40 MILLIGRAM(S): 80 TABLET, FILM COATED ORAL at 11:50

## 2023-02-20 RX ADMIN — Medication 30 MILLILITER(S): at 21:57

## 2023-02-20 RX ADMIN — Medication 20 MILLIEQUIVALENT(S): at 08:48

## 2023-02-20 RX ADMIN — SPIRONOLACTONE 25 MILLIGRAM(S): 25 TABLET, FILM COATED ORAL at 05:39

## 2023-02-20 RX ADMIN — Medication 12.5 MILLIGRAM(S): at 22:00

## 2023-02-20 RX ADMIN — CLOPIDOGREL BISULFATE 75 MILLIGRAM(S): 75 TABLET, FILM COATED ORAL at 11:50

## 2023-02-20 RX ADMIN — Medication 30 MILLILITER(S): at 05:38

## 2023-02-20 RX ADMIN — CHLORHEXIDINE GLUCONATE 1 APPLICATION(S): 213 SOLUTION TOPICAL at 05:42

## 2023-02-20 RX ADMIN — BUMETANIDE 2 MILLIGRAM(S): 0.25 INJECTION INTRAMUSCULAR; INTRAVENOUS at 05:39

## 2023-02-20 RX ADMIN — Medication 81 MILLIGRAM(S): at 11:50

## 2023-02-20 RX ADMIN — Medication 30 MILLILITER(S): at 00:09

## 2023-02-20 NOTE — PROGRESS NOTE ADULT - PROBLEM SELECTOR PLAN 2
- [facility]  1/22/23: pLAD 100%, mCx 50%, OM2 40%  - Continue with Aspirin & Plavix  - Continue with Lipitor 40 mg

## 2023-02-20 NOTE — PROGRESS NOTE ADULT - PROBLEM SELECTOR PLAN 1
Assessment:  - HFrEF 2/2 ICM (late presentation anterior MI 1/21/23 follows w/ Dr. Bradley Baron)  - TTE 1/22/23: EF 41%, distal septum, anterolateral wall akinetic  --> repeat TTE with EF 17%  - strict I&Os, standing daily weights, replete lytes K > 4, Mg > 2  - Optimization per HF  - Currently on Aldactone; refusing losartan (Entresto stopped due to hypotension)  - Plan to restart metoprolol per HF  - Diuresis per HF (tolerated Bumex) Assessment:  - HFrEF 2/2 ICM (late presentation anterior MI 1/21/23 follows w/ Dr. Bradley Baron)  - TTE 1/22/23: EF 41%, distal septum, anterolateral wall akinetic  --> repeat TTE with EF 17%  - strict I&Os, standing daily weights, replete lytes K > 4, Mg > 2  - Optimization per HF  - Currently on Aldactone; refusing losartan (Entresto stopped due to hypotension)  - Plan to restart metoprolol per HF  - Diuresis per HF (tolerated Bumex)    I/O unlikely to be accurate; Daily weight with minimum change

## 2023-02-20 NOTE — PROGRESS NOTE ADULT - SUBJECTIVE AND OBJECTIVE BOX
INCOMPLETE NOTE    Bulmaro Coats | PGY1| Pager: [phone]  Interval Events:    REVIEW OF SYSTEMS:  CONSTITUTIONAL: No weakness, fevers or chills  EYES/ENT: No visual changes;  No vertigo or throat pain   NECK: No pain or stiffness  RESPIRATORY: No cough, wheezing, hemoptysis; No shortness of breath  CARDIOVASCULAR: No chest pain or palpitations  GASTROINTESTINAL: No abdominal or epigastric pain. No nausea, vomiting, or hematemesis; No diarrhea or constipation. No melena or hematochezia.  GENITOURINARY: No dysuria, frequency or hematuria  NEUROLOGICAL: No numbness or weakness  SKIN: No itching, burning, rashes, or lesions   All other review of systems is negative unless indicated above.    OBJECTIVE:  ICU Vital Signs Last 24 Hrs  T(C): 36.6 (20 Feb 2023 04:59), Max: 37.1 (19 Feb 2023 20:44)  T(F): 97.8 (20 Feb 2023 04:59), Max: 98.8 (19 Feb 2023 20:44)  HR: 89 (20 Feb 2023 04:59) (89 - 101)  BP: 100/66 (20 Feb 2023 04:59) (89/58 - 102/71)  BP(mean): --  ABP: --  ABP(mean): --  RR: 18 (20 Feb 2023 04:59) (18 - 18)  SpO2: 96% (20 Feb 2023 04:59) (96% - 98%)    O2 Parameters below as of 20 Feb 2023 04:59  Patient On (Oxygen Delivery Method): room air              02-19 @ 07:01  -  02-20 @ 07:00  --------------------------------------------------------  IN: 310 mL / OUT: 0 mL / NET: 310 mL      CAPILLARY BLOOD GLUCOSE          PHYSICAL EXAM:  General: WN/WD NAD  Neurology: A&Ox3, nonfocal, MONREAL x 4  Eyes: PERRLA/ EOMI, Gross vision intact  ENT/Neck: Neck supple, trachea midline, No JVD, Gross hearing intact  Respiratory: CTA B/L, No wheezing, rales, rhonchi  CV: RRR, +S1/S2, -S3/S4, no murmurs, rubs or gallops  Abdominal: Soft, NT, ND +BS, No HSM  MSK: 5/5 strength UE/LE bilaterally  Extremities: No edema, 2+ peripheral pulses  Skin: No Rashes, Hematoma, Ecchymosis  Incisions:   Tubes:    HOSPITAL MEDICATIONS:  MEDICATIONS  (STANDING):  aspirin  chewable 81 milliGRAM(s) Oral daily  atorvastatin 40 milliGRAM(s) Oral daily  benzocaine/menthol Lozenge 1 Lozenge Oral every 6 hours  buMETAnide 2 milliGRAM(s) Oral daily  chlorhexidine 2% Cloths 1 Application(s) Topical <User Schedule>  clopidogrel Tablet 75 milliGRAM(s) Oral daily  losartan 12.5 milliGRAM(s) Oral daily  pantoprazole    Tablet 40 milliGRAM(s) Oral before breakfast  spironolactone 25 milliGRAM(s) Oral daily    MEDICATIONS  (PRN):  acetaminophen     Tablet .. 650 milliGRAM(s) Oral every 6 hours PRN Temp greater or equal to 38C (100.4F), Mild Pain (1 - 3)  aluminum hydroxide/magnesium hydroxide/simethicone Suspension 30 milliLiter(s) Oral every 4 hours PRN Dyspepsia  melatonin 3 milliGRAM(s) Oral at bedtime PRN Insomnia      LABS:                        11.4   7.89  )-----------( 448      ( 20 Feb 2023 06:20 )             36.3     Hgb Trend: 11.4<--, 11.5<--, 10.8<--, 11.0<--, 10.1<--  02-20    136  |  98  |  16  ----------------------------<  108<H>  3.9   |  25  |  0.85    Ca    9.5      20 Feb 2023 06:20  Phos  3.8     02-20  Mg     2.8     02-20    TPro  7.7  /  Alb  3.6  /  TBili  0.7  /  DBili  x   /  AST  16  /  ALT  14  /  AlkPhos  116  02-19    Creatinine Trend: 0.85<--, 0.83<--, 0.78<--, 0.93<--, 0.89<--, 0.88<--            MICROBIOLOGY:          Bulmaro Coats | PGY1| Pager: [phone]  Interval Events: NAEON    REVIEW OF SYSTEMS:  CONSTITUTIONAL: No dizziness/lightheadedness  RESPIRATORY: No cough, wheezing, hemoptysis; No shortness of breath  CARDIOVASCULAR: No chest pain or palpitations  GENITOURINARY: Endorses continued urination with Bumex  NEUROLOGICAL: No numbness or weakness  SKIN: No itching, burning, rashes, or lesions   All other review of systems is negative unless indicated above.    OBJECTIVE:  ICU Vital Signs Last 24 Hrs  T(C): 36.6 (20 Feb 2023 04:59), Max: 37.1 (19 Feb 2023 20:44)  T(F): 97.8 (20 Feb 2023 04:59), Max: 98.8 (19 Feb 2023 20:44)  HR: 89 (20 Feb 2023 04:59) (89 - 101)  BP: 100/66 (20 Feb 2023 04:59) (89/58 - 102/71)  BP(mean): --  ABP: --  ABP(mean): --  RR: 18 (20 Feb 2023 04:59) (18 - 18)  SpO2: 96% (20 Feb 2023 04:59) (96% - 98%)    O2 Parameters below as of 20 Feb 2023 04:59  Patient On (Oxygen Delivery Method): room air              02-19 @ 07:01  -  02-20 @ 07:00  --------------------------------------------------------  IN: 310 mL / OUT: 0 mL / NET: 310 mL      CAPILLARY BLOOD GLUCOSE          PHYSICAL EXAM:  General: WN/WD NAD  Neurology: A&Ox3, nonfocal, MONREAL x 4  Eyes: PERRLA/ EOMI, Gross vision intact  ENT/Neck: Neck supple, trachea midline, Gross hearing intact  Respiratory: CTA B/L, No wheezing, rales, rhonchi  CV: RRR, +S1/S2, -S3/S4, no murmurs, rubs or gallops  Abdominal: Soft, NT, ND +BS, No HSM  Extremities: 1+ pitting edema, 2+ peripheral pulses  Skin: No Rashes, Hematoma, Ecchymosis    HOSPITAL MEDICATIONS:  MEDICATIONS  (STANDING):  aspirin  chewable 81 milliGRAM(s) Oral daily  atorvastatin 40 milliGRAM(s) Oral daily  benzocaine/menthol Lozenge 1 Lozenge Oral every 6 hours  buMETAnide 2 milliGRAM(s) Oral daily  chlorhexidine 2% Cloths 1 Application(s) Topical <User Schedule>  clopidogrel Tablet 75 milliGRAM(s) Oral daily  losartan 12.5 milliGRAM(s) Oral daily  pantoprazole    Tablet 40 milliGRAM(s) Oral before breakfast  spironolactone 25 milliGRAM(s) Oral daily    MEDICATIONS  (PRN):  acetaminophen     Tablet .. 650 milliGRAM(s) Oral every 6 hours PRN Temp greater or equal to 38C (100.4F), Mild Pain (1 - 3)  aluminum hydroxide/magnesium hydroxide/simethicone Suspension 30 milliLiter(s) Oral every 4 hours PRN Dyspepsia  melatonin 3 milliGRAM(s) Oral at bedtime PRN Insomnia      LABS:                        11.4   7.89  )-----------( 448      ( 20 Feb 2023 06:20 )             36.3     Hgb Trend: 11.4<--, 11.5<--, 10.8<--, 11.0<--, 10.1<--  02-20    136  |  98  |  16  ----------------------------<  108<H>  3.9   |  25  |  0.85    Ca    9.5      20 Feb 2023 06:20  Phos  3.8     02-20  Mg     2.8     02-20    TPro  7.7  /  Alb  3.6  /  TBili  0.7  /  DBili  x   /  AST  16  /  ALT  14  /  AlkPhos  116  02-19    Creatinine Trend: 0.85<--, 0.83<--, 0.78<--, 0.93<--, 0.89<--, 0.88<--            MICROBIOLOGY:

## 2023-02-20 NOTE — PROGRESS NOTE ADULT - ASSESSMENT
67F recently admitted to [facility] with late presentation anterior MI (Jan 2022), CAD w/ 100% pLAD and HFrEF presenting with SOB, orthopnea and abdominal distention likely in setting of worsening EF and acute-on-chronic HFrEF. Patient was in CICU for desensitization given prior allergic reaction to Lasix and now tolerating Bumex 2mg PO qd; patient has been refusing losartan due to worsening dizziness while on the medication, pending further HF recs for GDMT optimization.

## 2023-02-20 NOTE — PROGRESS NOTE ADULT - ATTENDING COMMENTS
67F recently admitted to [facility] with late presentation anterior MI (Jan 2022), CAD w/ 100% pLAD and HFrEF presenting with SOB, orthopnea and abdominal distention likely in setting of worsening EF and acute-on-chronic HFrEF. Patient was in CICU for desensitization given prior allergic reaction to Lasix and now tolerating Bumex 2mg PO qd; patient has been refusing losartan due to worsening dizziness while on the medication, pending further HF recs for GDMT optimization.    -Continue GDMT, tolerating bumex, refusing Losartan   -can start lopressor 12.5 mg BID (w/ holding parameters)   -management as per HF-->may need RHC, f/u 2/21   -Supplement lytes today   -daily weights+ strict I+O--> currently negative 3kg, weight stable     Nehemias Ahmadi MD  Department of Hospital Medicine 67F recently admitted to [facility] with late presentation anterior MI (Jan 2022), CAD w/ 100% pLAD and HFrEF presenting with SOB, orthopnea and abdominal distention likely in setting of worsening EF and acute-on-chronic HFrEF. Patient was in CICU for desensitization given prior allergic reaction to Lasix and now tolerating Bumex 2mg PO qd; patient has been refusing losartan due to worsening dizziness while on the medication, pending further HF recs for GDMT optimization.    -Continue GDMT, tolerating bumex, refusing Losartan   -can start lopressor 12.5 mg BID (w/ holding parameters)   -management as per HF-->may need RHC, f/u 2/21   -daily weights+ strict I+O--> currently negative 3kg, weight stable     Nehemias Ahmadi MD  Department of Hospital Medicine

## 2023-02-21 ENCOUNTER — TRANSCRIPTION ENCOUNTER (OUTPATIENT)
Age: 68
End: 2023-02-21

## 2023-02-21 VITALS
HEART RATE: 97 BPM | TEMPERATURE: 98 F | DIASTOLIC BLOOD PRESSURE: 74 MMHG | RESPIRATION RATE: 18 BRPM | OXYGEN SATURATION: 98 % | SYSTOLIC BLOOD PRESSURE: 111 MMHG

## 2023-02-21 LAB
ANION GAP SERPL CALC-SCNC: 14 MMOL/L — SIGNIFICANT CHANGE UP (ref 5–17)
BUN SERPL-MCNC: 20 MG/DL — SIGNIFICANT CHANGE UP (ref 7–23)
CALCIUM SERPL-MCNC: 9.6 MG/DL — SIGNIFICANT CHANGE UP (ref 8.4–10.5)
CHLORIDE SERPL-SCNC: 97 MMOL/L — SIGNIFICANT CHANGE UP (ref 96–108)
CO2 SERPL-SCNC: 27 MMOL/L — SIGNIFICANT CHANGE UP (ref 22–31)
CREAT SERPL-MCNC: 0.88 MG/DL — SIGNIFICANT CHANGE UP (ref 0.5–1.3)
EGFR: 72 ML/MIN/1.73M2 — SIGNIFICANT CHANGE UP
GLUCOSE SERPL-MCNC: 100 MG/DL — HIGH (ref 70–99)
HCT VFR BLD CALC: 37.6 % — SIGNIFICANT CHANGE UP (ref 34.5–45)
HGB BLD-MCNC: 11.8 G/DL — SIGNIFICANT CHANGE UP (ref 11.5–15.5)
MAGNESIUM SERPL-MCNC: 2.7 MG/DL — HIGH (ref 1.6–2.6)
MCHC RBC-ENTMCNC: 27.9 PG — SIGNIFICANT CHANGE UP (ref 27–34)
MCHC RBC-ENTMCNC: 31.4 GM/DL — LOW (ref 32–36)
MCV RBC AUTO: 88.9 FL — SIGNIFICANT CHANGE UP (ref 80–100)
NRBC # BLD: 0 /100 WBCS — SIGNIFICANT CHANGE UP (ref 0–0)
PHOSPHATE SERPL-MCNC: 3.8 MG/DL — SIGNIFICANT CHANGE UP (ref 2.5–4.5)
PLATELET # BLD AUTO: 456 K/UL — HIGH (ref 150–400)
POTASSIUM SERPL-MCNC: 3.8 MMOL/L — SIGNIFICANT CHANGE UP (ref 3.5–5.3)
POTASSIUM SERPL-SCNC: 3.8 MMOL/L — SIGNIFICANT CHANGE UP (ref 3.5–5.3)
RBC # BLD: 4.23 M/UL — SIGNIFICANT CHANGE UP (ref 3.8–5.2)
RBC # FLD: 14.9 % — HIGH (ref 10.3–14.5)
SODIUM SERPL-SCNC: 138 MMOL/L — SIGNIFICANT CHANGE UP (ref 135–145)
WBC # BLD: 7.83 K/UL — SIGNIFICANT CHANGE UP (ref 3.8–10.5)
WBC # FLD AUTO: 7.83 K/UL — SIGNIFICANT CHANGE UP (ref 3.8–10.5)

## 2023-02-21 PROCEDURE — 87640 STAPH A DNA AMP PROBE: CPT

## 2023-02-21 PROCEDURE — 99239 HOSP IP/OBS DSCHRG MGMT >30: CPT | Mod: GC

## 2023-02-21 PROCEDURE — 86900 BLOOD TYPING SEROLOGIC ABO: CPT

## 2023-02-21 PROCEDURE — 82330 ASSAY OF CALCIUM: CPT

## 2023-02-21 PROCEDURE — 71045 X-RAY EXAM CHEST 1 VIEW: CPT

## 2023-02-21 PROCEDURE — 82435 ASSAY OF BLOOD CHLORIDE: CPT

## 2023-02-21 PROCEDURE — 84132 ASSAY OF SERUM POTASSIUM: CPT

## 2023-02-21 PROCEDURE — 82947 ASSAY GLUCOSE BLOOD QUANT: CPT

## 2023-02-21 PROCEDURE — 99285 EMERGENCY DEPT VISIT HI MDM: CPT

## 2023-02-21 PROCEDURE — 87637 SARSCOV2&INF A&B&RSV AMP PRB: CPT

## 2023-02-21 PROCEDURE — 80048 BASIC METABOLIC PNL TOTAL CA: CPT

## 2023-02-21 PROCEDURE — 86850 RBC ANTIBODY SCREEN: CPT

## 2023-02-21 PROCEDURE — 83735 ASSAY OF MAGNESIUM: CPT

## 2023-02-21 PROCEDURE — C8929: CPT

## 2023-02-21 PROCEDURE — 85027 COMPLETE CBC AUTOMATED: CPT

## 2023-02-21 PROCEDURE — 83605 ASSAY OF LACTIC ACID: CPT

## 2023-02-21 PROCEDURE — 80053 COMPREHEN METABOLIC PANEL: CPT

## 2023-02-21 PROCEDURE — 85018 HEMOGLOBIN: CPT

## 2023-02-21 PROCEDURE — 84100 ASSAY OF PHOSPHORUS: CPT

## 2023-02-21 PROCEDURE — 99233 SBSQ HOSP IP/OBS HIGH 50: CPT | Mod: GC

## 2023-02-21 PROCEDURE — 87641 MR-STAPH DNA AMP PROBE: CPT

## 2023-02-21 PROCEDURE — 85014 HEMATOCRIT: CPT

## 2023-02-21 PROCEDURE — 84295 ASSAY OF SERUM SODIUM: CPT

## 2023-02-21 PROCEDURE — 86901 BLOOD TYPING SEROLOGIC RH(D): CPT

## 2023-02-21 PROCEDURE — 86803 HEPATITIS C AB TEST: CPT

## 2023-02-21 PROCEDURE — 82803 BLOOD GASES ANY COMBINATION: CPT

## 2023-02-21 PROCEDURE — 84484 ASSAY OF TROPONIN QUANT: CPT

## 2023-02-21 PROCEDURE — 85025 COMPLETE CBC W/AUTO DIFF WBC: CPT

## 2023-02-21 PROCEDURE — 36415 COLL VENOUS BLD VENIPUNCTURE: CPT

## 2023-02-21 PROCEDURE — 93005 ELECTROCARDIOGRAM TRACING: CPT

## 2023-02-21 PROCEDURE — 83880 ASSAY OF NATRIURETIC PEPTIDE: CPT

## 2023-02-21 RX ORDER — BUMETANIDE 0.25 MG/ML
1 INJECTION INTRAMUSCULAR; INTRAVENOUS
Qty: 30 | Refills: 0
Start: 2023-02-21 | End: 2023-03-22

## 2023-02-21 RX ORDER — CLOPIDOGREL BISULFATE 75 MG/1
1 TABLET, FILM COATED ORAL
Qty: 30 | Refills: 0
Start: 2023-02-21 | End: 2023-03-22

## 2023-02-21 RX ORDER — LOSARTAN POTASSIUM 100 MG/1
0.5 TABLET, FILM COATED ORAL
Qty: 15 | Refills: 1
Start: 2023-02-21 | End: 2023-04-21

## 2023-02-21 RX ORDER — POTASSIUM CHLORIDE 20 MEQ
20 PACKET (EA) ORAL ONCE
Refills: 0 | Status: COMPLETED | OUTPATIENT
Start: 2023-02-21 | End: 2023-02-21

## 2023-02-21 RX ORDER — LOSARTAN POTASSIUM 100 MG/1
1 TABLET, FILM COATED ORAL
Qty: 30 | Refills: 0
Start: 2023-02-21 | End: 2023-03-22

## 2023-02-21 RX ORDER — SPIRONOLACTONE 25 MG/1
1 TABLET, FILM COATED ORAL
Qty: 30 | Refills: 0
Start: 2023-02-21 | End: 2023-03-22

## 2023-02-21 RX ORDER — METOPROLOL TARTRATE 50 MG
0.5 TABLET ORAL
Qty: 15 | Refills: 1
Start: 2023-02-21 | End: 2023-04-21

## 2023-02-21 RX ADMIN — CHLORHEXIDINE GLUCONATE 1 APPLICATION(S): 213 SOLUTION TOPICAL at 05:50

## 2023-02-21 RX ADMIN — Medication 81 MILLIGRAM(S): at 12:09

## 2023-02-21 RX ADMIN — SPIRONOLACTONE 25 MILLIGRAM(S): 25 TABLET, FILM COATED ORAL at 05:57

## 2023-02-21 RX ADMIN — ATORVASTATIN CALCIUM 40 MILLIGRAM(S): 80 TABLET, FILM COATED ORAL at 14:12

## 2023-02-21 RX ADMIN — CLOPIDOGREL BISULFATE 75 MILLIGRAM(S): 75 TABLET, FILM COATED ORAL at 12:09

## 2023-02-21 RX ADMIN — Medication 20 MILLIEQUIVALENT(S): at 09:09

## 2023-02-21 RX ADMIN — BUMETANIDE 2 MILLIGRAM(S): 0.25 INJECTION INTRAMUSCULAR; INTRAVENOUS at 05:56

## 2023-02-21 RX ADMIN — Medication 30 MILLILITER(S): at 05:56

## 2023-02-21 NOTE — PROGRESS NOTE ADULT - PROBLEM SELECTOR PLAN 1
Assessment:  - HFrEF 2/2 ICM (late presentation anterior MI 1/21/23 follows w/ Dr. Bradley Baron)  - TTE 1/22/23: EF 41%, distal septum, anterolateral wall akinetic  --> repeat TTE with EF 17%  - strict I&Os, standing daily weights, replete lytes K > 4, Mg > 2  - Optimization per HF  - Currently on Aldactone; refusing losartan (Entresto stopped due to hypotension)  - Plan to restart metoprolol per HF  - Diuresis per HF (tolerated Bumex)    I/O unlikely to be accurate; Daily weight with minimum change Assessment:  - HFrEF 2/2 ICM (late presentation anterior MI 1/21/23 follows w/ Dr. Bradley Baron)  - TTE 1/22/23: EF 41%, distal septum, anterolateral wall akinetic  --> repeat TTE with EF 17%  - strict I&Os, standing daily weights, replete lytes K > 4, Mg > 2  - Optimization per HF  - Entresto; sbp 60 in the CICU; holding until o/p followup  - Aldactone 25mg QD  - Bumex 2mg PO Daily  - Losartan 12.5mg Daily  - Diuresis per HF (tolerated Bumex)    I/O unlikely to be accurate; Daily weight with minimum change

## 2023-02-21 NOTE — PROVIDER CONTACT NOTE (OTHER) - BACKGROUND
Patient being treated for CHF
patient admitted for heart failure.
Dx: CHF  Hx: Anxiety, STEMI, Sinus Tachycardia
Admit diagnosis: Heart failure Principal DX: CHF exacerbation Problem/DX: History of acute anterior wall MI, Adverse reaction to drug, anxiety, abdominal discomfort

## 2023-02-21 NOTE — PROGRESS NOTE ADULT - PROBLEM SELECTOR PLAN 1
-Daily weights, I/Os, Mg >2, K > 4  -c/w bumex PO  -c/w losartan 12.5 daily   -C/w metoprolol 12.5 mg QD   -spironolactone 25 qd    Please have patient walk to assess toleration to medications -Daily weights, I/Os, Mg >2, K > 4  -c/w bumex PO  -c/w losartan 12.5 daily   -C/w metoprolol 12.5 mg QD   - spironolactone 25 qd  Pt refused SGLT2i in the past. Will rechallenge as outpt.   [facility] recommended - pt refused.     Please have patient walk to assess toleration to medications  Will arrange for outpt follow up - Attending supervisor RICARDO Ledezma

## 2023-02-21 NOTE — DISCHARGE NOTE NURSING/CASE MANAGEMENT/SOCIAL WORK - NSDCPEFALRISK_GEN_ALL_CORE
For information on Fall & Injury Prevention, visit: [email or web address] OR  [email or web address] OR  https://www.cdc.gov/steadi/patient.html

## 2023-02-21 NOTE — PROGRESS NOTE ADULT - ATTENDING SUPERVISION STATEMENT
Fellow
Resident

## 2023-02-21 NOTE — PROVIDER CONTACT NOTE (OTHER) - SITUATION
Patient C/O dizziness and lightheadedness, Patient hypotensive BP 80/52
Patient has in that was placed on the 16th. patient is refusing new iv placement.
Tele Event: WCT for 5 seconds
Patient is refusing to take Losartan

## 2023-02-21 NOTE — DISCHARGE NOTE NURSING/CASE MANAGEMENT/SOCIAL WORK - NSDCPEPT PROEDHF_GEN_ALL_CORE
Dr. Ahsan Dietz,    Please see note from this patient's audiogram from today. Please let me know if there is anything further you need.         Wilfrid Martinez  Audiologist
Call primary care provider for follow up after discharge/Activities as tolerated/Low salt diet/Monitor weight daily/Report signs and symptoms to primary care provider

## 2023-02-21 NOTE — PROGRESS NOTE ADULT - ASSESSMENT
67F recently admitted to [facility] with late presentation anterior MI (Jan 2022), CAD w/ 100% pLAD and HFrEF presenting with SOB, orthopnea and abdominal distention likely in setting of worsening EF and acute-on-chronic HFrEF. Patient was in CICU for desensitization given prior allergic reaction to Lasix and now tolerating Bumex 2mg PO qd; patient has been refusing losartan due to worsening dizziness while on the medication, pending further HF recs for GDMT optimization. 67F recently admitted to [facility] with late presentation anterior MI (Jan 2022), CAD w/ 100% pLAD and HFrEF presenting with SOB, orthopnea and abdominal distention likely in setting of worsening EF and acute-on-chronic HFrEF. Patient was in CICU for desensitization given prior allergic reaction to Lasix and now tolerating Bumex 2mg PO qd; patient has been refusing losartan due to worsening dizziness while on the medication, Pt able to ambulate without signficant symptoms; plan for DC with HF o/p followup.

## 2023-02-21 NOTE — PROGRESS NOTE ADULT - PROBLEM SELECTOR PROBLEM 1
CHF exacerbation
ACS (acute coronary syndrome)
CHF exacerbation

## 2023-02-21 NOTE — PROGRESS NOTE ADULT - PROVIDER SPECIALTY LIST ADULT
Cardiology
Cardiology
Heart Failure
Cardiology
Critical Care
Internal Medicine
Internal Medicine
Critical Care
Heart Failure
Internal Medicine
SHAY
Cardiology
Internal Medicine
SHAY
Heart Failure
Heart Failure
Internal Medicine

## 2023-02-21 NOTE — PROVIDER CONTACT NOTE (OTHER) - RECOMMENDATIONS
Explained to patient the indication and importance of taking losartan. Patient is refusing at this time and would like to discuss with her doctor.
Notify MD
Team MD notified.
Notify Provider - Brandt Tan

## 2023-02-21 NOTE — PROVIDER CONTACT NOTE (OTHER) - ASSESSMENT
pt is alert & oriented x4. pt is on room air. pt has no complaints of SOB/chest pain/palpitations. VSS except for BP on softer end. Provider aware.
Patient A&O4. Patient C/O dizziness and lightheadedness. Patient hypotensive BP 80/52. Patient denies CP, SOB, nausea, vomiting. Neuro check WDL. Other VSS.
Patient is A&Ox4. Patient states when she took Losartan yesterday it lowered her BP and made her feel lousy.
patient remains aox4;vss. iv site cremains patent and no s/s of infection. pt refused new iv insertion

## 2023-02-21 NOTE — DISCHARGE NOTE NURSING/CASE MANAGEMENT/SOCIAL WORK - PATIENT PORTAL LINK FT
You can access the FollowMyHealth Patient Portal offered by [facility] by registering at the following website: [email or web address]. By joining [employer]’s FollowMyHealth portal, you will also be able to view your health information using other applications (apps) compatible with our system.

## 2023-02-21 NOTE — PROVIDER CONTACT NOTE (OTHER) - ACTION/TREATMENT ORDERED:
MD made aware, order in to extend IV until following day. RN educated patient about importance of iv replacement to decrease infection. RN will endorse to next shift. Monitoring remains ongoing.

## 2023-02-21 NOTE — PROVIDER CONTACT NOTE (OTHER) - REASON
Tele Event: WCT for 5 seconds
Patient C/O dizziness and lightheadedness, Patient hypotensive BP 80/52
Patient is refusing to take Losartan
Pt refused new iv insertion

## 2023-02-21 NOTE — PROGRESS NOTE ADULT - ATTENDING COMMENTS
Hospitalist- Dov Ward MD  Available on MS Teams  If no response or off-hours, page [phone]  -------------------------------------  Pt admitted with leg swelling and sob consistent with ADHF. Has hx of loop diuretic allergy so underwent desensitization in CCU, now tolerating bumex well. Cleared for dc by HF team with plans for OP follow up to eventually consider transitioning back to entresto. Pt and daughter in agreement with plan.    Total discharge time: 45 minutes.

## 2023-02-21 NOTE — PROGRESS NOTE ADULT - ATTENDING COMMENTS
68 y/o female with recently diagnosed ICMP s/p late presentation STEMI in December (non revascularized-occluded LAD) who is now readmitted with CHF exacerbation. Her TTE demonstrates akinetic/hypokinetic anterior wall, LVEDd 5.5cm and worsening LVEF to 19% (~40% on discharge). Today looks euvolemic. However, she is tolerating minimal doses of GDMT. We discussed her advanced cardiomyopathy and concerning features.   I have recommended a RHC prior to DC to rule out low output state which she has refused. It seems she wasn't aware that she was taking losartan and as soon as she learned she is she refused it.  I had a long chat with the patient and her daughter (who is an NP)  regarding her current condition and prognosis. There seems to be a lack of insight and some degree of denial. The patient was not taking any medications prior to her MI.   She is requesting to go home.   Needs close follow up.

## 2023-02-21 NOTE — PROGRESS NOTE ADULT - SUBJECTIVE AND OBJECTIVE BOX
INCOMPLETE NOTE    Bulmaro Coats | PGY1| Pager: [phone]  Interval Events:    REVIEW OF SYSTEMS:  CONSTITUTIONAL: No weakness, fevers or chills  EYES/ENT: No visual changes;  No vertigo or throat pain   NECK: No pain or stiffness  RESPIRATORY: No cough, wheezing, hemoptysis; No shortness of breath  CARDIOVASCULAR: No chest pain or palpitations  GASTROINTESTINAL: No abdominal or epigastric pain. No nausea, vomiting, or hematemesis; No diarrhea or constipation. No melena or hematochezia.  GENITOURINARY: No dysuria, frequency or hematuria  NEUROLOGICAL: No numbness or weakness  SKIN: No itching, burning, rashes, or lesions   All other review of systems is negative unless indicated above.    OBJECTIVE:  ICU Vital Signs Last 24 Hrs  T(C): 36.3 (21 Feb 2023 05:47), Max: 36.8 (20 Feb 2023 11:10)  T(F): 97.3 (21 Feb 2023 05:47), Max: 98.3 (20 Feb 2023 11:10)  HR: 94 (21 Feb 2023 05:47) (89 - 94)  BP: 92/61 (21 Feb 2023 05:47) (92/61 - 105/70)  BP(mean): --  ABP: --  ABP(mean): --  RR: 18 (21 Feb 2023 05:47) (18 - 18)  SpO2: 98% (21 Feb 2023 05:47) (96% - 98%)    O2 Parameters below as of 21 Feb 2023 05:47  Patient On (Oxygen Delivery Method): room air              02-20 @ 07:01  -  02-21 @ 07:00  --------------------------------------------------------  IN: 960 mL / OUT: 100 mL / NET: 860 mL      CAPILLARY BLOOD GLUCOSE          PHYSICAL EXAM:  General: WN/WD NAD  Neurology: A&Ox3, nonfocal, MONREAL x 4  Eyes: PERRLA/ EOMI, Gross vision intact  ENT/Neck: Neck supple, trachea midline, No JVD, Gross hearing intact  Respiratory: CTA B/L, No wheezing, rales, rhonchi  CV: RRR, +S1/S2, -S3/S4, no murmurs, rubs or gallops  Abdominal: Soft, NT, ND +BS, No HSM  MSK: 5/5 strength UE/LE bilaterally  Extremities: No edema, 2+ peripheral pulses  Skin: No Rashes, Hematoma, Ecchymosis  Incisions:   Tubes:    HOSPITAL MEDICATIONS:  MEDICATIONS  (STANDING):  aspirin  chewable 81 milliGRAM(s) Oral daily  atorvastatin 40 milliGRAM(s) Oral daily  buMETAnide 2 milliGRAM(s) Oral daily  chlorhexidine 2% Cloths 1 Application(s) Topical <User Schedule>  clopidogrel Tablet 75 milliGRAM(s) Oral daily  losartan 12.5 milliGRAM(s) Oral daily  metoprolol succinate ER 12.5 milliGRAM(s) Oral at bedtime  pantoprazole    Tablet 40 milliGRAM(s) Oral before breakfast  spironolactone 25 milliGRAM(s) Oral daily    MEDICATIONS  (PRN):  acetaminophen     Tablet .. 650 milliGRAM(s) Oral every 6 hours PRN Temp greater or equal to 38C (100.4F), Mild Pain (1 - 3)  aluminum hydroxide/magnesium hydroxide/simethicone Suspension 30 milliLiter(s) Oral every 4 hours PRN Dyspepsia  benzocaine/menthol Lozenge 1 Lozenge Oral every 6 hours PRN Sore Throat  melatonin 3 milliGRAM(s) Oral at bedtime PRN Insomnia      LABS:                        11.8   7.83  )-----------( 456      ( 21 Feb 2023 06:30 )             37.6     Hgb Trend: 11.8<--, 11.4<--, 11.5<--, 10.8<--, 11.0<--  02-20    136  |  98  |  16  ----------------------------<  108<H>  3.9   |  25  |  0.85    Ca    9.5      20 Feb 2023 06:20  Phos  3.8     02-20  Mg     2.8     02-20      Creatinine Trend: 0.85<--, 0.83<--, 0.78<--, 0.93<--, 0.89<--, 0.88<--            MICROBIOLOGY:          Bulmaro Coats | PGY1| Pager: [phone]  Interval Events: NAEON; patient without significant complaints of dyspnea/orthopnea; able to ambulate without dizziness/lightheadedness    REVIEW OF SYSTEMS:  All other review of systems is negative unless indicated above.    OBJECTIVE:  ICU Vital Signs Last 24 Hrs  T(C): 36.3 (21 Feb 2023 05:47), Max: 36.8 (20 Feb 2023 11:10)  T(F): 97.3 (21 Feb 2023 05:47), Max: 98.3 (20 Feb 2023 11:10)  HR: 94 (21 Feb 2023 05:47) (89 - 94)  BP: 92/61 (21 Feb 2023 05:47) (92/61 - 105/70)  BP(mean): --  ABP: --  ABP(mean): --  RR: 18 (21 Feb 2023 05:47) (18 - 18)  SpO2: 98% (21 Feb 2023 05:47) (96% - 98%)    O2 Parameters below as of 21 Feb 2023 05:47  Patient On (Oxygen Delivery Method): room air              02-20 @ 07:01  -  02-21 @ 07:00  --------------------------------------------------------  IN: 960 mL / OUT: 100 mL / NET: 860 mL      CAPILLARY BLOOD GLUCOSE          PHYSICAL EXAM:  General: WN/WD NAD  Neurology: A&Ox3, nonfocal, MONREAL x 4  Eyes: PERRLA/ EOMI, Gross vision intact  Respiratory: CTA B/L, No wheezing, rales, rhonchi  CV: RRR, +S1/S2, -S3/S4, no murmurs, rubs or gallops  Abdominal: Soft, NT, ND +BS, No HSM  Extremities: 1+ pitting edema  Skin: No Rashes, Hematoma, Ecchymosis    HOSPITAL MEDICATIONS:  MEDICATIONS  (STANDING):  aspirin  chewable 81 milliGRAM(s) Oral daily  atorvastatin 40 milliGRAM(s) Oral daily  buMETAnide 2 milliGRAM(s) Oral daily  chlorhexidine 2% Cloths 1 Application(s) Topical <User Schedule>  clopidogrel Tablet 75 milliGRAM(s) Oral daily  losartan 12.5 milliGRAM(s) Oral daily  metoprolol succinate ER 12.5 milliGRAM(s) Oral at bedtime  pantoprazole    Tablet 40 milliGRAM(s) Oral before breakfast  spironolactone 25 milliGRAM(s) Oral daily    MEDICATIONS  (PRN):  acetaminophen     Tablet .. 650 milliGRAM(s) Oral every 6 hours PRN Temp greater or equal to 38C (100.4F), Mild Pain (1 - 3)  aluminum hydroxide/magnesium hydroxide/simethicone Suspension 30 milliLiter(s) Oral every 4 hours PRN Dyspepsia  benzocaine/menthol Lozenge 1 Lozenge Oral every 6 hours PRN Sore Throat  melatonin 3 milliGRAM(s) Oral at bedtime PRN Insomnia      LABS:                        11.8   7.83  )-----------( 456      ( 21 Feb 2023 06:30 )             37.6     Hgb Trend: 11.8<--, 11.4<--, 11.5<--, 10.8<--, 11.0<--  02-20    136  |  98  |  16  ----------------------------<  108<H>  3.9   |  25  |  0.85    Ca    9.5      20 Feb 2023 06:20  Phos  3.8     02-20  Mg     2.8     02-20      Creatinine Trend: 0.85<--, 0.83<--, 0.78<--, 0.93<--, 0.89<--, 0.88<--            MICROBIOLOGY:

## 2023-02-21 NOTE — PROGRESS NOTE ADULT - PROBLEM SELECTOR PLAN 2
- [facility]  1/22/23: pLAD 100%, mCx 50%, OM2 40%  - Continue with Aspirin & Plavix  - Continue with Lipitor 40 mg

## 2023-02-21 NOTE — PROGRESS NOTE ADULT - SUBJECTIVE AND OBJECTIVE BOX
Interval History:  NAEON     Medications:  acetaminophen     Tablet .. 650 milliGRAM(s) Oral every 6 hours PRN  aluminum hydroxide/magnesium hydroxide/simethicone Suspension 30 milliLiter(s) Oral every 4 hours PRN  aspirin  chewable 81 milliGRAM(s) Oral daily  atorvastatin 40 milliGRAM(s) Oral daily  benzocaine/menthol Lozenge 1 Lozenge Oral every 6 hours PRN  buMETAnide 2 milliGRAM(s) Oral daily  chlorhexidine 2% Cloths 1 Application(s) Topical <User Schedule>  clopidogrel Tablet 75 milliGRAM(s) Oral daily  losartan 12.5 milliGRAM(s) Oral daily  melatonin 3 milliGRAM(s) Oral at bedtime PRN  metoprolol succinate ER 12.5 milliGRAM(s) Oral at bedtime  pantoprazole    Tablet 40 milliGRAM(s) Oral before breakfast  spironolactone 25 milliGRAM(s) Oral daily      Vitals:  T(C): 36.6 (23 @ 11:13), Max: 36.7 (23 @ 20:44)  HR: 97 (23 @ 11:13) (89 - 97)  BP: 96/74 (23 @ 11:13) (92/61 - 105/70)  BP(mean): --  RR: 18 (23 @ 11:13) (18 - 18)  SpO2: 95% (23 @ 11:13) (95% - 98%)    Daily     Daily Weight in k.2 (2023 07:31)        I&O's Summary    2023 07:  -  2023 07:00  --------------------------------------------------------  IN: 960 mL / OUT: 100 mL / NET: 860 mL    2023 07:01  -  2023 13:22  --------------------------------------------------------  IN: 240 mL / OUT: 0 mL / NET: 240 mL        Physical Exam:  GENERAL: No acute distress, well-developed  HEAD:  Atraumatic, Normocephalic  ENT: improved JVP    CHEST/LUNG: CTAB   BACK: No spinal tenderness  HEART: Regular rate and rhythm; No murmurs, rubs, or gallops  ABDOMEN: Soft, Nontender, Nondistended; Bowel sounds present  EXTREMITIES:  trace edema   PSYCH: Nl behavior, nl affect  NEUROLOGY: AAOx3, non-focal, cranial nerves intact  SKIN: Normal color, No rashes or lesions    Labs:                        11.8   7.83  )-----------( 456      ( 2023 06:30 )             37.6     02-21    138  |  97  |  20  ----------------------------<  100<H>  3.8   |  27  |  0.88    Ca    9.6      2023 06:30  Phos  3.8     02-  Mg     2.7     21

## 2023-02-22 ENCOUNTER — APPOINTMENT (OUTPATIENT)
Dept: INTERNAL MEDICINE | Facility: CLINIC | Age: 68
End: 2023-02-22

## 2023-02-22 ENCOUNTER — NON-APPOINTMENT (OUTPATIENT)
Age: 68
End: 2023-02-22

## 2023-03-01 ENCOUNTER — APPOINTMENT (OUTPATIENT)
Dept: CARDIOLOGY | Facility: CLINIC | Age: 68
End: 2023-03-01
Payer: COMMERCIAL

## 2023-03-01 ENCOUNTER — NON-APPOINTMENT (OUTPATIENT)
Age: 68
End: 2023-03-01

## 2023-03-01 VITALS
SYSTOLIC BLOOD PRESSURE: 101 MMHG | OXYGEN SATURATION: 98 % | HEIGHT: 66 IN | WEIGHT: 162 LBS | HEART RATE: 87 BPM | DIASTOLIC BLOOD PRESSURE: 70 MMHG | BODY MASS INDEX: 26.03 KG/M2

## 2023-03-01 PROCEDURE — 93000 ELECTROCARDIOGRAM COMPLETE: CPT

## 2023-03-01 PROCEDURE — 99214 OFFICE O/P EST MOD 30 MIN: CPT | Mod: 25

## 2023-03-01 RX ORDER — FUROSEMIDE 40 MG/1
40 TABLET ORAL DAILY
Qty: 90 | Refills: 3 | Status: DISCONTINUED | COMMUNITY
End: 2023-03-01

## 2023-03-01 RX ORDER — PANTOPRAZOLE 40 MG/1
40 TABLET, DELAYED RELEASE ORAL DAILY
Qty: 90 | Refills: 3 | Status: DISCONTINUED | COMMUNITY
Start: 2023-02-01 | End: 2023-03-01

## 2023-03-02 NOTE — CARDIOLOGY SUMMARY
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\par  [de-identified] : \par 01/23/23 (MRI viability) - image quality markedly degraded due to motion artifact, in particular, \par the late gadolinium enhancement sequences are limited due to motion; transmural scarring involving the mid anterior wall of the left  ventricle; there is likely microvascular obstruction involving the left ventricular apical anterior wall and majority of the septal wall, LVEF 36%\par  [de-identified] : \par 01/22/23 (CATH) - pLAD 100% (faint collaterals from RPDA), mCx 50%, pOM2 40%, mRCA 20%

## 2023-03-02 NOTE — PHYSICAL EXAM
[Well Developed] : well developed [Well Nourished] : well nourished [No Acute Distress] : no acute distress [Normal Conjunctiva] : normal conjunctiva [Normal Venous Pressure] : normal venous pressure [No Carotid Bruit] : no carotid bruit [No Murmur] : no murmur [No Rub] : no rub [No Gallop] : no gallop [Clear Lung Fields] : clear lung fields [Good Air Entry] : good air entry [No Respiratory Distress] : no respiratory distress  [Soft] : abdomen soft [Non Tender] : non-tender [Normal Gait] : normal gait [No Cyanosis] : no cyanosis [No Rash] : no rash [No Skin Lesions] : no skin lesions [Moves all extremities] : moves all extremities [No Focal Deficits] : no focal deficits [Normal Speech] : normal speech [Alert and Oriented] : alert and oriented [Normal S1, S2] : normal S1, S2 [No Edema] : no edema

## 2023-03-02 NOTE — HISTORY OF PRESENT ILLNESS
[FreeTextEntry1] : Recently admitted to [facility] for SOB/decompensated heart failure. Reinitiated on loop diuretic. She was unable to tolerate Entresto as it dropped her blood pressure. Currently feeling much better. Seems to be tolerating medical regimen. Denies chest pain, shortness of breath or palpitations. has lost about 12 lbs since admission.

## 2023-03-02 NOTE — DISCUSSION/SUMMARY
[Coronary Artery Disease] : coronary artery disease [Systolic Heart Failure] : systolic heart failure [Stable] : stable [Compensated] : compensated [FreeTextEntry1] : \par Currently stable from a cardiovascular standpoint. Normotensive (low normal). Chronic systolic heart failure secondary to ischemic cardiomyopathy (history of late presentation anterior MI, LVEF 36-41% -> 19%). Currently appears euvolemic. No ischemic or CHF symptoms. At this time, patient is considered ACC/AHA CHF stage C. Continue current medications including bumetanide 2 mg daily. Recent hospitalization record reviewed. ECG completed today and reviewed. Daily weights and low salt diet advised. Follow up in 1 month. [EKG obtained to assist in diagnosis and management of assessed problem(s)] : EKG obtained to assist in diagnosis and management of assessed problem(s)

## 2023-03-15 ENCOUNTER — APPOINTMENT (OUTPATIENT)
Dept: HEART FAILURE | Facility: CLINIC | Age: 68
End: 2023-03-15
Payer: COMMERCIAL

## 2023-03-15 ENCOUNTER — NON-APPOINTMENT (OUTPATIENT)
Age: 68
End: 2023-03-15

## 2023-03-15 VITALS
SYSTOLIC BLOOD PRESSURE: 109 MMHG | BODY MASS INDEX: 26.28 KG/M2 | WEIGHT: 163.5 LBS | HEART RATE: 95 BPM | DIASTOLIC BLOOD PRESSURE: 70 MMHG | HEIGHT: 66 IN | OXYGEN SATURATION: 99 %

## 2023-03-15 PROCEDURE — 99214 OFFICE O/P EST MOD 30 MIN: CPT | Mod: 25

## 2023-03-15 PROCEDURE — 36415 COLL VENOUS BLD VENIPUNCTURE: CPT

## 2023-03-15 PROCEDURE — 93000 ELECTROCARDIOGRAM COMPLETE: CPT

## 2023-03-16 NOTE — REASON FOR VISIT
[FreeTextEntry1] : Hospital Course: \par Discharge Date	21-Feb-2023 \par Admission Date	11-Feb-2023 15:55 \par Reason for Admission	Shortness of breath \par Hospital Course	 \par 67F recently admitted to [facility] with late presentation anterior MI (Jan 2022), \par CAD w/ 100% pLAD, HFrEF (EF 40%) likely ICM presenting with persistent \par abdominal discomfort and shortness of breath at night. \par \par After discharge from hospital for anterior wall STEMI, has had persistent \par abdominal discomfort and increasing dyspnea when lying flat. Dyspnea is not \par present when upright or with exertion but has been unable to lie flat at night. \par Denies peripheral edema or weight gain. Denies chest pain or diaphoresis since \par the initial STEMI. Was seen by outpatient cardiologist Dr. Baron and was started \par on Lasix however developed a diffuse pruritic rash (without blistering/mucosal \par involvement)< 1 day and therefore discontinued the medication and rash resolved \par on its own. She presented for worsening dyspnea and orthopnea. \par \par Patient was on the floor and was diuresed initially with aldactone with \par insufficient response. TTE showed worsening EF from 46% to 19% as well as \par progression of her prior WMA. She was then transferred to the CICU for \par bumetanide desensitization and toleration. She tolerated the bumetanide well. \par While in the CICU, she had SBP in 60s and Entresto was discontinued and she was \par started on Losartan. She was transferred back to telemetry floor where she \par remained hemodynamically stable. Patient reports she felt dizziness/general \par discomfort while on Losartan and refused to take it. Since downgrade from the \par ICU, she had signficant improvement of Dyspnea and orthopnea and was able to \par ambulate without difficulty. \par \par At time of discharge, patient was deemed medically stable and ready for \par outpatient followup. \par \par \par Major Medical Changes \par - Follow up with Dr. Baron for Interventional Followup in 2 weeks \par - Follow up with Dr. Lester Ward for HF Followup in 1-2 weeks \par - Follow up with Dr. Konrad Aviles in 1 week for general health followup \par - Take the following medications \par - Losartan 12.5mg qd (patient will likely refuse to take the medication) \par - Aldactone 25mg qd \par - Bumex 2mg PO daily \par - Metoprolol Succinate 12.5 mg BID \par - ASA 81mg daily \par - Plavix 75mg Daily \par - Protonix 40mg PO Daily \par \par Med Reconciliation: \par  [Cardiac Failure] : cardiac failure

## 2023-03-16 NOTE — REASON FOR VISIT
[FreeTextEntry1] : Hospital Course: \par Discharge Date	21-Feb-2023 \par Admission Date	11-Feb-2023 15:55 \par Reason for Admission	Shortness of breath \par Hospital Course	 \par 67F recently admitted to [facility] with late presentation anterior MI (Jan 2022), \par CAD w/ 100% pLAD, HFrEF (EF 40%) likely ICM presenting with persistent \par abdominal discomfort and shortness of breath at night. \par \par After discharge from hospital for anterior wall STEMI, has had persistent \par abdominal discomfort and increasing dyspnea when lying flat. Dyspnea is not \par present when upright or with exertion but has been unable to lie flat at night. \par Denies peripheral edema or weight gain. Denies chest pain or diaphoresis since \par the initial STEMI. Was seen by outpatient cardiologist Dr. Baron and was started \par on Lasix however developed a diffuse pruritic rash (without blistering/mucosal \par involvement)< 1 day and therefore discontinued the medication and rash resolved \par on its own. She presented for worsening dyspnea and orthopnea. \par \par Patient was on the floor and was diuresed initially with aldactone with \par insufficient response. TTE showed worsening EF from 46% to 19% as well as \par progression of her prior WMA. She was then transferred to the CICU for \par bumetanide desensitization and toleration. She tolerated the bumetanide well. \par While in the CICU, she had SBP in 60s and Entresto was discontinued and she was \par started on Losartan. She was transferred back to telemetry floor where she \par remained hemodynamically stable. Patient reports she felt dizziness/general \par discomfort while on Losartan and refused to take it. Since downgrade from the \par ICU, she had signficant improvement of Dyspnea and orthopnea and was able to \par ambulate without difficulty. \par \par At time of discharge, patient was deemed medically stable and ready for \par outpatient followup. \par \par \par Major Medical Changes \par - Follow up with Dr. Baron for Interventional Followup in 2 weeks \par - Follow up with Dr. Lester Ward for HF Followup in 1-2 weeks \par - Follow up with Dr. Konrad Aviles in 1 week for general health followup \par - Take the following medications \par - Losartan 12.5mg qd (patient will likely refuse to take the medication) \par - Aldactone 25mg qd \par - Bumex 2mg PO daily \par - Metoprolol Succinate 12.5 mg BID \par - ASA 81mg daily \par - Plavix 75mg Daily \par - Protonix 40mg PO Daily \par \par Med Reconciliation: \par  [Cardiac Failure] : cardiac failure

## 2023-03-17 ENCOUNTER — APPOINTMENT (OUTPATIENT)
Dept: INTERNAL MEDICINE | Facility: CLINIC | Age: 68
End: 2023-03-17

## 2023-03-17 ENCOUNTER — NON-APPOINTMENT (OUTPATIENT)
Age: 68
End: 2023-03-17

## 2023-03-17 LAB
ALBUMIN SERPL ELPH-MCNC: 3.9 G/DL
ALP BLD-CCNC: 98 U/L
ALT SERPL-CCNC: 16 U/L
ANION GAP SERPL CALC-SCNC: 17 MMOL/L
AST SERPL-CCNC: 20 U/L
BASOPHILS # BLD AUTO: 0.05 K/UL
BASOPHILS NFR BLD AUTO: 0.5 %
BILIRUB SERPL-MCNC: 0.4 MG/DL
BUN SERPL-MCNC: 21 MG/DL
CALCIUM SERPL-MCNC: 9.6 MG/DL
CHLORIDE SERPL-SCNC: 94 MMOL/L
CO2 SERPL-SCNC: 26 MMOL/L
CREAT SERPL-MCNC: 1.22 MG/DL
EGFR: 49 ML/MIN/1.73M2
EOSINOPHIL # BLD AUTO: 0.24 K/UL
EOSINOPHIL NFR BLD AUTO: 2.6 %
ESTIMATED AVERAGE GLUCOSE: 117 MG/DL
HBA1C MFR BLD HPLC: 5.7 %
HCT VFR BLD CALC: 36.8 %
HGB BLD-MCNC: 11.4 G/DL
IMM GRANULOCYTES NFR BLD AUTO: 0.2 %
LYMPHOCYTES # BLD AUTO: 2.15 K/UL
LYMPHOCYTES NFR BLD AUTO: 23.2 %
MAN DIFF?: NORMAL
MCHC RBC-ENTMCNC: 26.6 PG
MCHC RBC-ENTMCNC: 31 GM/DL
MCV RBC AUTO: 86 FL
MONOCYTES # BLD AUTO: 0.69 K/UL
MONOCYTES NFR BLD AUTO: 7.4 %
NEUTROPHILS # BLD AUTO: 6.13 K/UL
NEUTROPHILS NFR BLD AUTO: 66.1 %
NT-PROBNP SERPL-MCNC: 5486 PG/ML
PLATELET # BLD AUTO: 538 K/UL
POTASSIUM SERPL-SCNC: 3.6 MMOL/L
PROT SERPL-MCNC: 8.8 G/DL
RBC # BLD: 4.28 M/UL
RBC # FLD: 14.6 %
SODIUM SERPL-SCNC: 136 MMOL/L
TSH SERPL-ACNC: 0.95 UIU/ML
WBC # FLD AUTO: 9.28 K/UL

## 2023-03-17 NOTE — ASSESSMENT
[FreeTextEntry1] : 68 y/o female with recently diagnosed ICMP s/p late presentation STEMI in December 2022 (non revascularized-occluded LAD),  readmitted with CHF exacerbation. Her TTE demonstrates akinetic/hypokinetic anterior wall, LVEDd5.5cm and worsening LVEF to 19% (~40% on discharge) tolerating minimal doses of GDMT. Discussed her advancedcardiomyopathy and concerning features. Inpt team at [facility] recommended a RHC prior to DC to rule out low output state which she had refused . It seems she wasn't aware that she was taking losartan and as soon asshe learned she is she refused it. As per notes, Late presenting STEMI without prox LAD (pLAD 100%, mCx 50%, %), HFrEF 35% now 19%, presenting with dyspnea and abdominal l pain, with recent\par outpatient visit with Dr. Baron found to be volume overloaded, started on lasix but with allergy. Tolerated Bumex 2 PO\par \par Appears euvolemic and normotensive with low B/P readings at home 80-90's\par  \par Cards tests:\par 1/22/23 LHC: Short LM, pLAD with 100% stenosis with faint collaterals from\par RPDA, Cx there is 50% stenosis in middle third portion of segment after branch\par to OM1, 40% stenosis in proximal third, mRCA 20% stenosis\par 1/22/23 TTE: LVEF 41%, MAC, tethered MV, mild MR, calcified AV with normal\par opening, Mod segmental LV dysfunction, apex akinetic, normal RV\par 2/13/23 TTE: LVEF 19%, LVIDd 5.5, No thrombus, Moderate diastolic dysfunction,\par Borderline decrease RV s', PASP 48\par \par 1: CHF exacerbation.\par -  increase metoprolol to 12.5 mg bid from daily as tolerated \par - labs today and will consider decrease in bumex and restarting low dose losartan if K and renal function are  stable\par -c/w bumex PO 2 mg daily \par -not on  losartan 12.5 daily due to low B/P\par - spironolactone 25 qd\par - Pt refused SGLT2i in the past. Will rechallenge as outpt.\par - RHC recommended - pt refused as inpt, will discuss with next visit\par  \par  2: S/P Adverse reaction to drug.- lasix \par - tolerated bumex without any issues.\par  \par 3.  History of acute anterior wall MI.\par -  C/w DAPT,Statin.\par  \par \par \par Follow up in the office in 2 weeks with Dr. Ward, will call with labs\par

## 2023-03-17 NOTE — ASSESSMENT
[FreeTextEntry1] : 66 y/o female with recently diagnosed ICMP s/p late presentation STEMI in December 2022 (non revascularized-occluded LAD),  readmitted with CHF exacerbation. Her TTE demonstrates akinetic/hypokinetic anterior wall, LVEDd5.5cm and worsening LVEF to 19% (~40% on discharge) tolerating minimal doses of GDMT. Discussed her advancedcardiomyopathy and concerning features. Inpt team at [facility] recommended a RHC prior to DC to rule out low output state which she had refused . It seems she wasn't aware that she was taking losartan and as soon asshe learned she is she refused it. As per notes, Late presenting STEMI without prox LAD (pLAD 100%, mCx 50%, %), HFrEF 35% now 19%, presenting with dyspnea and abdominal l pain, with recent\par outpatient visit with Dr. Baron found to be volume overloaded, started on lasix but with allergy. Tolerated Bumex 2 PO\par \par Appears euvolemic and normotensive with low B/P readings at home 80-90's\par  \par Cards tests:\par 1/22/23 LHC: Short LM, pLAD with 100% stenosis with faint collaterals from\par RPDA, Cx there is 50% stenosis in middle third portion of segment after branch\par to OM1, 40% stenosis in proximal third, mRCA 20% stenosis\par 1/22/23 TTE: LVEF 41%, MAC, tethered MV, mild MR, calcified AV with normal\par opening, Mod segmental LV dysfunction, apex akinetic, normal RV\par 2/13/23 TTE: LVEF 19%, LVIDd 5.5, No thrombus, Moderate diastolic dysfunction,\par Borderline decrease RV s', PASP 48\par \par 1: CHF exacerbation.\par -  increase metoprolol to 12.5 mg bid from daily as tolerated \par - labs today and will consider decrease in bumex and restarting low dose losartan if K and renal function are  stable\par -c/w bumex PO 2 mg daily \par -not on  losartan 12.5 daily due to low B/P\par - spironolactone 25 qd\par - Pt refused SGLT2i in the past. Will rechallenge as outpt.\par - RHC recommended - pt refused as inpt, will discuss with next visit\par  \par  2: S/P Adverse reaction to drug.- lasix \par - tolerated bumex without any issues.\par  \par 3.  History of acute anterior wall MI.\par -  C/w DAPT,Statin.\par  \par \par \par Follow up in the office in 2 weeks with Dr. Ward, will call with labs\par

## 2023-03-17 NOTE — CARDIOLOGY SUMMARY
[de-identified] : 3/15/23 NSR 96, short AK, LAFB, NSST [de-identified] : 2/13/23 TTE: LVEF 19%, LVIDd 5.5, No thrombus, Moderate diastolic dysfunction,\par \par 1/22/23 TTE: LVEF 41%, MAC, tethered MV, mild MR, calcified AV with normal\par opening, Mod segmental LV dysfunction, apex akinetic, normal RV\par \par Borderline decrease RV s', PASP 48 [de-identified] : 1/22/23 LHC: Short LM, pLAD with 100% stenosis with faint collaterals from\par RPDA, Cx there is 50% stenosis in middle third portion of segment after branch\par to OM1, 40% stenosis in proximal third, mRCA 20% stenosis

## 2023-03-17 NOTE — PHYSICAL EXAM
[Well Nourished] : well nourished [Normal Conjunctiva] : normal conjunctiva [Normal S1, S2] : normal S1, S2 [Clear Lung Fields] : clear lung fields [Soft] : abdomen soft [Non Tender] : non-tender [Normal Gait] : normal gait [No Edema] : no edema [No Rash] : no rash [Normal] : alert and oriented, normal memory [de-identified] : JVP 8 cm

## 2023-03-17 NOTE — ADDENDUM
[FreeTextEntry1] : Labs reviewed notable for Na 136, K 3.6,Cl 92, CO2 26 and BUN/creat 21/1.22, serum pro BNP 5486 from 10,657 on 2/11/23. Will follow up to see if pt tolerated increase in metoprolol to 12.5 mg bid and will discuss further recommendations with Dr. Ward.

## 2023-03-17 NOTE — HISTORY OF PRESENT ILLNESS
[FreeTextEntry1] : Mariela Meraz is a 67F with recent late presenting STEMI without prox LAD (pLAD 100%, mCx 50%, OM2\par 40%), HFrEF 35% now 19%, presenting with dyspnea, abdominal pain, with recent\par outpatient visit with Dr. Baron found to be volume overloaded, started on lasix but with allergy. Tolerated Bumex 2 PO. Pt is here for a post hospital follow up after admission 2/11/23-2/21/23 at [facility].  \par \par Pt is here with her daughter  who is an NP at [employer] ( occupational health [address] [place] [address]).\par Pt states she is feeling better since d/c. Her weight range is 161.2 lbs and was d/c at 165 lbs. PT states prior to hospitalization when she had orthopnea, her weight went up to 174 lbs.Hoeme B/P range 80-90's with dizziness when 80's and is 109/70 in office today. \par \par States she can walk in her home without dyspnea but is not walking outside yet. She can go downstairs without dyspnea but has not tried walking up stairs. She sleeps with 2 pillows with no orthopnea/PND.\par \par Tolerating low doses of GDMT including dulce 25 mg daily, metoprolol 12.5 mg  daily and is taking bumex 2 mg daily. States she was put on Entresto at [facility] and B/P went into 60's. SHe did not tolerate losartan 12.5 mg in hospital.\par \par Denies chest pain, palpitations, dizziness/LH and syncope. She does not have an ICD.\par \par Labs:\par  \par            11.8\par 7.83  )-----------( 456      ( 21 Feb 2023 06:30 )\par            37.6\par  \par 02-21\par  \par 138  |  97  |  20\par ----------------------------<  100<H>\par 3.8   |  27  |  0.88\par  \par Ca    9.6      21 Feb 2023 06:30\par Phos  3.8     02-21\par Mg     2.7     02-21\par  \par  \par Cards tests:\par 1/22/23 LHC: Short LM, pLAD with 100% stenosis with faint collaterals from\par RPDA, Cx there is 50% stenosis in middle third portion of segment after branch\par to OM1, 40% stenosis in proximal third, mRCA 20% stenosis\par 1/22/23 TTE: LVEF 41%, MAC, tethered MV, mild MR, calcified AV with normal\par opening, Mod segmental LV dysfunction, apex akinetic, normal RV\par 2/13/23 TTE: LVEF 19%, LVIDd 5.5, No thrombus, Moderate diastolic dysfunction,\par Borderline decrease RV s', PASP 48\par  \par

## 2023-03-17 NOTE — PHYSICAL EXAM
[Well Nourished] : well nourished [Normal Conjunctiva] : normal conjunctiva [Normal S1, S2] : normal S1, S2 [Clear Lung Fields] : clear lung fields [Soft] : abdomen soft [Non Tender] : non-tender [Normal Gait] : normal gait [No Edema] : no edema [No Rash] : no rash [Normal] : alert and oriented, normal memory [de-identified] : JVP 8 cm

## 2023-03-17 NOTE — CARDIOLOGY SUMMARY
[de-identified] : 3/15/23 NSR 96, short MS, LAFB, NSST [de-identified] : 2/13/23 TTE: LVEF 19%, LVIDd 5.5, No thrombus, Moderate diastolic dysfunction,\par \par 1/22/23 TTE: LVEF 41%, MAC, tethered MV, mild MR, calcified AV with normal\par opening, Mod segmental LV dysfunction, apex akinetic, normal RV\par \par Borderline decrease RV s', PASP 48 [de-identified] : 1/22/23 LHC: Short LM, pLAD with 100% stenosis with faint collaterals from\par RPDA, Cx there is 50% stenosis in middle third portion of segment after branch\par to OM1, 40% stenosis in proximal third, mRCA 20% stenosis

## 2023-03-17 NOTE — HISTORY OF PRESENT ILLNESS
[FreeTextEntry1] : Mariela Meraz is a 67F with recent late presenting STEMI without prox LAD (pLAD 100%, mCx 50%, OM2\par 40%), HFrEF 35% now 19%, presenting with dyspnea, abdominal pain, with recent\par outpatient visit with Dr. Baron found to be volume overloaded, started on lasix but with allergy. Tolerated Bumex 2 PO. Pt is here for a post hospital follow up after admission 2/11/23-2/21/23 at [facility].  \par \par Pt is here with her daughter  who is an NP at [employer] [employer] ( occupational health [address] [place] [address]).\par Pt states she is feeling better since d/c. Her weight range is 161.2 lbs and was d/c at 165 lbs. PT states prior to hospitalization when she had orthopnea, her weight went up to 174 lbs.Hoeme B/P range 80-90's with dizziness when 80's and is 109/70 in office today. \par \par States she can walk in her home without dyspnea but is not walking outside yet. She can go downstairs without dyspnea but has not tried walking up stairs. She sleeps with 2 pillows with no orthopnea/PND.\par \par Tolerating low doses of GDMT including dulce 25 mg daily, metoprolol 12.5 mg  daily and is taking bumex 2 mg daily. States she was put on Entresto at [facility] and B/P went into 60's. SHe did not tolerate losartan 12.5 mg in hospital.\par \par Denies chest pain, palpitations, dizziness/LH and syncope. She does not have an ICD.\par \par Labs:\par  \par            11.8\par 7.83  )-----------( 456      ( 21 Feb 2023 06:30 )\par            37.6\par  \par 02-21\par  \par 138  |  97  |  20\par ----------------------------<  100<H>\par 3.8   |  27  |  0.88\par  \par Ca    9.6      21 Feb 2023 06:30\par Phos  3.8     02-21\par Mg     2.7     02-21\par  \par  \par Cards tests:\par 1/22/23 LHC: Short LM, pLAD with 100% stenosis with faint collaterals from\par RPDA, Cx there is 50% stenosis in middle third portion of segment after branch\par to OM1, 40% stenosis in proximal third, mRCA 20% stenosis\par 1/22/23 TTE: LVEF 41%, MAC, tethered MV, mild MR, calcified AV with normal\par opening, Mod segmental LV dysfunction, apex akinetic, normal RV\par 2/13/23 TTE: LVEF 19%, LVIDd 5.5, No thrombus, Moderate diastolic dysfunction,\par Borderline decrease RV s', PASP 48\par  \par

## 2023-03-22 ENCOUNTER — NON-APPOINTMENT (OUTPATIENT)
Age: 68
End: 2023-03-22

## 2023-03-24 ENCOUNTER — NON-APPOINTMENT (OUTPATIENT)
Age: 68
End: 2023-03-24

## 2023-04-05 ENCOUNTER — APPOINTMENT (OUTPATIENT)
Dept: INTERNAL MEDICINE | Facility: CLINIC | Age: 68
End: 2023-04-05

## 2023-04-12 ENCOUNTER — APPOINTMENT (OUTPATIENT)
Dept: CARDIOLOGY | Facility: CLINIC | Age: 68
End: 2023-04-12
Payer: COMMERCIAL

## 2023-04-12 ENCOUNTER — NON-APPOINTMENT (OUTPATIENT)
Age: 68
End: 2023-04-12

## 2023-04-12 VITALS
DIASTOLIC BLOOD PRESSURE: 68 MMHG | OXYGEN SATURATION: 94 % | WEIGHT: 160 LBS | HEIGHT: 66 IN | HEART RATE: 87 BPM | SYSTOLIC BLOOD PRESSURE: 101 MMHG | BODY MASS INDEX: 25.71 KG/M2

## 2023-04-12 PROCEDURE — 99214 OFFICE O/P EST MOD 30 MIN: CPT | Mod: 25

## 2023-04-12 PROCEDURE — 93000 ELECTROCARDIOGRAM COMPLETE: CPT

## 2023-04-13 LAB
ANION GAP SERPL CALC-SCNC: 17 MMOL/L
BUN SERPL-MCNC: 19 MG/DL
CALCIUM SERPL-MCNC: 9.9 MG/DL
CHLORIDE SERPL-SCNC: 94 MMOL/L
CO2 SERPL-SCNC: 27 MMOL/L
CREAT SERPL-MCNC: 0.94 MG/DL
EGFR: 67 ML/MIN/1.73M2
GLUCOSE SERPL-MCNC: 63 MG/DL
NT-PROBNP SERPL-MCNC: 3331 PG/ML
POTASSIUM SERPL-SCNC: 4.5 MMOL/L
SODIUM SERPL-SCNC: 138 MMOL/L

## 2023-04-14 NOTE — CARDIOLOGY SUMMARY
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\par  [de-identified] : \par 01/23/23 (MRI viability) - image quality markedly degraded due to motion artifact, in particular, \par the late gadolinium enhancement sequences are limited due to motion; transmural scarring involving the mid anterior wall of the left  ventricle; there is likely microvascular obstruction involving the left ventricular apical anterior wall and majority of the septal wall, LVEF 36%\par  [de-identified] : \par 01/22/23 (CATH) - pLAD 100% (faint collaterals from RPDA), mCx 50%, pOM2 40%, mRCA 20%

## 2023-04-24 ENCOUNTER — APPOINTMENT (OUTPATIENT)
Dept: INTERNAL MEDICINE | Facility: CLINIC | Age: 68
End: 2023-04-24

## 2023-06-07 ENCOUNTER — APPOINTMENT (OUTPATIENT)
Dept: HEART FAILURE | Facility: CLINIC | Age: 68
End: 2023-06-07

## 2023-06-12 NOTE — ED PROVIDER NOTE - TEMPLATE, MLM
OV 3/20/23: Patient is a 89 y/o female who presents today accompanied by her daughter for evaluation of abdominal distention. She reports feeling like her abdomen is distended or larger that is was previously. Has multiple BMs per day with loose stool. Taking magnesium supplement. Was seen in 2019 by our group for constipation. No new medication, travel, antibiotics, or ill contacts. Admits to some chronic mild dysphagia but currently tolerating diet well with only rare episodes of dysphagia to solids. Last EGD in 2017 with finding of benign esophageal stenosis (dilated 18mm balloon). Denies fever, CP, SOB, wt loss, N/v, odynophagia, constipation, or signs of GI bleeding. -------------------------------------------- OV 5/1/23: Patient presents today for follow up accompanied by her daughter. Labs/stool studies 4/10/23 showed pancreatic insufficiency. Abdominal CT 3/29/23 showed stable lung nodule, hital hernia, gallbladder absent, pancreatic atrophy (no mass), diverticulosis (no diverticulitis), peripelvic cyst, and degenerative spine changes but no acute process/malignancy. Results discussed with patient. She was unable to tolerate fiber supplement. Reports continued loose stool and bloating. Tolerating diet. Denies abdominal pain, N/V, dysphagia, wt loss, constipation or signs of GI bleeding. --------------------------------------------- Today 6/12/23: Patient presents today for follow up accompanied by her daughter. She reports doing well. She is not taking cholestyramine or pancreatic enzymes. States "I dont like to take medicines". States her diarrhea has currently improved. No new GI complaints. General

## 2023-07-05 ENCOUNTER — NON-APPOINTMENT (OUTPATIENT)
Age: 68
End: 2023-07-05

## 2023-07-05 ENCOUNTER — APPOINTMENT (OUTPATIENT)
Dept: CARDIOLOGY | Facility: CLINIC | Age: 68
End: 2023-07-05
Payer: COMMERCIAL

## 2023-07-05 VITALS
HEIGHT: 66 IN | WEIGHT: 165 LBS | BODY MASS INDEX: 26.52 KG/M2 | SYSTOLIC BLOOD PRESSURE: 105 MMHG | OXYGEN SATURATION: 98 % | HEART RATE: 65 BPM | DIASTOLIC BLOOD PRESSURE: 68 MMHG

## 2023-07-05 PROCEDURE — 93000 ELECTROCARDIOGRAM COMPLETE: CPT

## 2023-07-05 PROCEDURE — 99214 OFFICE O/P EST MOD 30 MIN: CPT | Mod: 25

## 2023-07-05 NOTE — PHYSICAL EXAM
[Well Developed] : well developed [Well Nourished] : well nourished [No Acute Distress] : no acute distress [Normal Conjunctiva] : normal conjunctiva [Normal Venous Pressure] : normal venous pressure [No Carotid Bruit] : no carotid bruit [Normal S1, S2] : normal S1, S2 [No Murmur] : no murmur [No Rub] : no rub [No Gallop] : no gallop [Clear Lung Fields] : clear lung fields [Good Air Entry] : good air entry [No Respiratory Distress] : no respiratory distress  [Soft] : abdomen soft [Non Tender] : non-tender [Normal Gait] : normal gait [No Edema] : no edema [No Cyanosis] : no cyanosis [No Rash] : no rash [No Skin Lesions] : no skin lesions [Moves all extremities] : moves all extremities [No Focal Deficits] : no focal deficits [Alert and Oriented] : alert and oriented [Normal Speech] : normal speech

## 2023-07-05 NOTE — CARDIOLOGY SUMMARY
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\par  [de-identified] : \par 01/23/23 (MRI viability) - image quality markedly degraded due to motion artifact, in particular, \par the late gadolinium enhancement sequences are limited due to motion; transmural scarring involving the mid anterior wall of the left  ventricle; there is likely microvascular obstruction involving the left ventricular apical anterior wall and majority of the septal wall, LVEF 36%\par  [de-identified] : \par 01/22/23 (CATH) - pLAD 100% (faint collaterals from RPDA), mCx 50%, pOM2 40%, mRCA 20%

## 2023-07-05 NOTE — DISCUSSION/SUMMARY
[Coronary Artery Disease] : coronary artery disease [Systolic Heart Failure] : systolic heart failure [Stable] : stable [Compensated] : compensated [FreeTextEntry1] : \par Currently stable from a cardiovascular standpoint. Normotensive (low normal). Chronic systolic heart failure secondary to ischemic cardiomyopathy (history of late presentation anterior MI, LVEF 36-41% -> 19%). Currently appears euvolemic. No ischemic or CHF symptoms. At this time, patient is considered ACC/AHA CHF stage C. Continue current medications including bumetanide 2 mg daily, spironolactone and metoprolol succinate. ECG completed today and reviewed. Daily weights and low salt diet advised. Will schedule an echo to reassess her cardiac structures and function. Pending test result, I will make further recommendations. Follow up in 3 months. [EKG obtained to assist in diagnosis and management of assessed problem(s)] : EKG obtained to assist in diagnosis and management of assessed problem(s)

## 2023-07-12 ENCOUNTER — APPOINTMENT (OUTPATIENT)
Dept: HEART FAILURE | Facility: CLINIC | Age: 68
End: 2023-07-12

## 2023-07-27 ENCOUNTER — OUTPATIENT (OUTPATIENT)
Dept: OUTPATIENT SERVICES | Facility: HOSPITAL | Age: 68
LOS: 1 days | End: 2023-07-27

## 2023-07-27 ENCOUNTER — APPOINTMENT (OUTPATIENT)
Dept: CV DIAGNOSITCS | Facility: HOSPITAL | Age: 68
End: 2023-07-27
Payer: COMMERCIAL

## 2023-07-27 DIAGNOSIS — I50.22 CHRONIC SYSTOLIC (CONGESTIVE) HEART FAILURE: ICD-10-CM

## 2023-07-27 PROCEDURE — 93306 TTE W/DOPPLER COMPLETE: CPT | Mod: 26

## 2023-08-21 ENCOUNTER — NON-APPOINTMENT (OUTPATIENT)
Age: 68
End: 2023-08-21

## 2023-08-22 ENCOUNTER — APPOINTMENT (OUTPATIENT)
Dept: CARDIOLOGY | Facility: CLINIC | Age: 68
End: 2023-08-22

## 2023-08-22 NOTE — PLAN
[FreeTextEntry1] : Cardiac Rehabilitation Phase 2 Focus assessment and physical exam at session #1 ITP initiated- confer with Dr. Pa  All questions answered.  Welcome packet mailed. Start date: 10/25/23 Will offer earlier start date if there is availability.

## 2023-08-22 NOTE — REASON FOR VISIT
[Intake Visit] : an intake visit [Assessment] : an assessment [FreeTextEntry1] : ITP to be reviewed and manually signed by Dr. Campbell; ITP to be finalized at session #1; Clinical indication for cardiac rehabilitation: STEMI

## 2023-08-22 NOTE — REVIEW OF SYSTEMS
[Fever] : no fever [Chills] : no chills [Fatigue] : no fatigue [Night Sweats] : no night sweats [Vision Problems] : no vision problems [Hearing Loss] : no hearing loss [Chest Pain] : no chest pain [Palpitations] : no palpitations [Leg Claudication] : no leg claudication [Lower Ext Edema] : no lower extremity edema [Orthopnea] : no orthopnea [Paroxysmal Nocturnal Dyspnea] : no paroxysmal nocturnal dyspnea [Shortness Of Breath] : no shortness of breath [Cough] : no cough [Dyspnea on Exertion] : no dyspnea on exertion [Abdominal Pain] : no abdominal pain [Nausea] : no nausea [Vomiting] : no vomiting [Heartburn] : no heartburn [Incontinence] : no incontinence [Frequency] : no frequency [Joint Pain] : no joint pain [Joint Stiffness] : no joint stiffness [Joint Swelling] : no joint swelling [Muscle Pain] : no muscle pain [Back Pain] : no back pain [Itching] : no itching [Skin Rash] : no skin rash [Headache] : no headache [Dizziness] : no dizziness [Fainting] : no fainting [Memory Loss] : no memory loss [Unsteady Walking] : no ataxia [Easy Bleeding] : easy bleeding [Easy Bruising] : easy bruising [de-identified] : attributes to plavix

## 2023-08-22 NOTE — REASON FOR VISIT
[Intake Visit] : an intake visit [Assessment] : an assessment [FreeTextEntry1] : ITP to be reviewed and manually signed by Dr. Campblel; ITP to be finalized at session #1; Clinical indication for cardiac rehabilitation: STEMI

## 2023-08-22 NOTE — HISTORY OF PRESENT ILLNESS
[FreeTextEntry1] : Miss Mariela Meraz is a pleasent 67 year old female s/p late presenting STEMI in January 2023  culprit lesion prox LAD (pLAD 100%, mCx 50%, %), resulting in ICM with HFrEF 35%, now 15-20%. Patient presents today via telephone for cardiac rehab initial intake assessment and states she is feeling well and is without focal complaints at time of phone call. Patient states she was in her usual state of health until she developed left arm burning back in January which was accompanied by sudden onset dizziness and nausea. Patient attributed symptoms to stomach upset/ virus and proceeded to go to work. The following day she was not feeling well and decided to go to the ED, was urgently admitted and had angiogram for what was called a late presentation STEMI and no intervention was performed. Patient was admitted to intensive care where she was found to have a low ejection fraction. Her initial hospital course was uncomplicated and patient was discharged home on a duretic regimen. Several days later she developed a rash which she attributed to PO Lasix, she stated that she was told to d/c medication but was unsure if there was a new diuretic she was placed on. She was readmitted several days after discontinuing the Lasix for CHF exacerbation, was in intensive care again and desensitized to Bumex. Patient was discharged home on bumex and has been tolerating well without issues. EF on most recent echocardiogram in July was 15-20%. Patient lives at home and states she is able to go about her daily activities without feeling short of breath, is able to walk approx 30 minutes several days per week, and even recently went on vacation and was very active without any issues. Patient states she is hesitant to have ICD placed, but understands that it may be necessary. She currently is working from home and is embracing a heart healthy lifestyle, she is eager to begin cardiac rehab.  [Fully functional (bathing, dressing, toileting, transferring, walking, feeding)] : Fully functional (bathing, dressing, toileting, transferring, walking, feeding) [Low sodium diet] : low sodium diet [Other diet strategies] : other diet strategies [Exercise] : exercise [Weight management] : weight management [Patient will verbalize factors contributing to improved blood pressure management] : Patient will verbalize factors contributing to improved blood pressure management [Patient will maintain blood pressure < 130/80 mmHg] : patient will maintain blood pressure < 130/80 mmHg [Medication Adherence] : medication adherence [Home monitoring of blood pressure] : home monitoring of blood pressure [Use of stress management techniques] : Use of stress management techniques [Diet changes including (healthy heart eating, less processed foods, low sodium diet)] : Diet changes including (healthy heart eating, less processed foods, low sodium diet) [Halt the salt] : halt the salt [Guidelines for blood pressure management] : guidelines for blood pressure management [Define hypertension] : define hypertension [Signs and symptoms] : signs and symptoms [Role of lifestyle behaviors in blood pressure management] : role of lifestyle behaviors in blood pressure management [Medications] : medications [Exercise and blood pressure] : exercise and blood pressure [Food labels] : food labels [Sodium] : sodium [Processed food] : processed food [Height: ___] : Height: [unfilled] [Weight: ___ lbs] : Weight: [unfilled] lbs [Does patient monitor weight at home?] : Does patient monitor weight at home? Yes [Patient will lose 0.5 to 1 lb per week] : Patient will lose 0.5 to 1lb per week [Monitoring of weight at home and at cardiac rehabilitation] : Monitoring of weight at home and at cardiac rehabilitation [Individualized exercise program as developed at cardiac rehabilitation] : Individualized exercise program as developed at cardiac rehabilitation [Motivation/Desire] : motivation/desire [Myocardial infraction] : myocardial infraction [Age] : age [HLD] : HLD [Hypertension] : hypertension [Sedentary] : sedentary [Overweight/Obesity] : overweight/obesity [Angina with exercise] : no angina with exercise [Fall Risk] : no fall risk [] : no [___ Days per week] : [unfilled] days per week [>= 31 minutes per session] : >= 31 minutes per session [Treadmill] : treadmill [Recumbent bike] : recumbent bike [Upright exercise bike] : upright exercise bike [BioStep] : BioStep [Elliptical] : elliptical [Upper body ergometer] : upper body ergometer [Stair Climber] : stair climber [Walking] : walking [Running] : running [Stretching/ROM exercises and balance exercises daily] : Stretching/ROM exercises and balance exercises daily [Will assess for musculoskeletal and other restrictions] : will assess for musculoskeletal and other restrictions [Perform aerobic activity for ___ consecutive minutes] : perform aerobic activity for [unfilled] consecutive minutes [To start resistance training] : to start resistance training [To return to my previous hobbies and activities] : to return to my previous hobbies and activities  [Exercise Benefits/Guidelines education] : exercise benefits/guidelines education [Individualized Exercise Rx] : individualized exercise Rx [Signs/Symptoms to report] : signs/symptoms to report [Hemodynamic responses] : hemodynamic responses [Home exercise] : home exercise [Patient verbalizes understanding of exercise education all questions answered] : Patient verbalizes understanding of exercise education all questions answered [Teach back utilized] : teach back utilized [Welcome packet provided] : welcome packet provided [FreeTextEntry6] : walking at home [de-identified] : Intake: Patient is active and walks approx 30 mins outside several times a week, no complaints with exercise. Able to perform ADLs independently [Has the patient given CR team permission to speak with the emergency  about the patient?] : Has the patient given CR team permission to speak with the emergency  about the patient? Yes [FreeTextEntry9] : Intake: Endorses good psychosocial wellbeing, she states she is very mindful and able to calm herself down in stressfull situations [Assessment] : Assessment [Action] : Action [Is patient on medication for hyperlipidemia?] : Is patient on medication for hyperlipidemia? Yes [Is Patient adherent with medication?] : patient is adherent with medication [Patient has seen registered dietitian in the past?] : Patient has seen registered dietitian in the past? Yes [Patient will include healthy diet pattern approaches to healthy eating] : Patient will include healthy diet pattern approaches to healthy eating [Self-reports of improved health eating patterns] : Self-reports of improved health eating patterns [Discuss an overview of healthy eating plan] : Discuss an overview of healthy eating plan [FreeTextEntry8] : Intake: Demonstrates understanding of heart healthy diet and measures that must be taken to ensure heart health. Eats a low fat, low sodium diet.

## 2023-08-22 NOTE — REVIEW OF SYSTEMS
[Fever] : no fever [Chills] : no chills [Fatigue] : no fatigue [Night Sweats] : no night sweats [Vision Problems] : no vision problems [Hearing Loss] : no hearing loss [Chest Pain] : no chest pain [Palpitations] : no palpitations [Leg Claudication] : no leg claudication [Lower Ext Edema] : no lower extremity edema [Orthopnea] : no orthopnea [Paroxysmal Nocturnal Dyspnea] : no paroxysmal nocturnal dyspnea [Shortness Of Breath] : no shortness of breath [Cough] : no cough [Dyspnea on Exertion] : no dyspnea on exertion [Abdominal Pain] : no abdominal pain [Nausea] : no nausea [Vomiting] : no vomiting [Heartburn] : no heartburn [Incontinence] : no incontinence [Frequency] : no frequency [Joint Pain] : no joint pain [Joint Stiffness] : no joint stiffness [Joint Swelling] : no joint swelling [Muscle Pain] : no muscle pain [Back Pain] : no back pain [Itching] : no itching [Skin Rash] : no skin rash [Headache] : no headache [Dizziness] : no dizziness [Fainting] : no fainting [Memory Loss] : no memory loss [Unsteady Walking] : no ataxia [Easy Bleeding] : easy bleeding [Easy Bruising] : easy bruising [de-identified] : attributes to plavix

## 2023-08-22 NOTE — HISTORY OF PRESENT ILLNESS
[FreeTextEntry1] : Miss Mariela Meraz is a pleasent 67 year old female s/p late presenting STEMI in January 2023  culprit lesion prox LAD (pLAD 100%, mCx 50%, %), resulting in ICM with HFrEF 35%, now 15-20%. Patient presents today via telephone for cardiac rehab initial intake assessment and states she is feeling well and is without focal complaints at time of phone call. Patient states she was in her usual state of health until she developed left arm burning back in January which was accompanied by sudden onset dizziness and nausea. Patient attributed symptoms to stomach upset/ virus and proceeded to go to work. The following day she was not feeling well and decided to go to the ED, was urgently admitted and had angiogram for what was called a late presentation STEMI and no intervention was performed. Patient was admitted to intensive care where she was found to have a low ejection fraction. Her initial hospital course was uncomplicated and patient was discharged home on a duretic regimen. Several days later she developed a rash which she attributed to PO Lasix, she stated that she was told to d/c medication but was unsure if there was a new diuretic she was placed on. She was readmitted several days after discontinuing the Lasix for CHF exacerbation, was in intensive care again and desensitized to Bumex. Patient was discharged home on bumex and has been tolerating well without issues. EF on most recent echocardiogram in July was 15-20%. Patient lives at home and states she is able to go about her daily activities without feeling short of breath, is able to walk approx 30 minutes several days per week, and even recently went on vacation and was very active without any issues. Patient states she is hesitant to have ICD placed, but understands that it may be necessary. She currently is working from home and is embracing a heart healthy lifestyle, she is eager to begin cardiac rehab.  [Fully functional (bathing, dressing, toileting, transferring, walking, feeding)] : Fully functional (bathing, dressing, toileting, transferring, walking, feeding) [Low sodium diet] : low sodium diet [Other diet strategies] : other diet strategies [Exercise] : exercise [Weight management] : weight management [Patient will verbalize factors contributing to improved blood pressure management] : Patient will verbalize factors contributing to improved blood pressure management [Patient will maintain blood pressure < 130/80 mmHg] : patient will maintain blood pressure < 130/80 mmHg [Medication Adherence] : medication adherence [Home monitoring of blood pressure] : home monitoring of blood pressure [Use of stress management techniques] : Use of stress management techniques [Diet changes including (healthy heart eating, less processed foods, low sodium diet)] : Diet changes including (healthy heart eating, less processed foods, low sodium diet) [Halt the salt] : halt the salt [Guidelines for blood pressure management] : guidelines for blood pressure management [Define hypertension] : define hypertension [Signs and symptoms] : signs and symptoms [Role of lifestyle behaviors in blood pressure management] : role of lifestyle behaviors in blood pressure management [Medications] : medications [Exercise and blood pressure] : exercise and blood pressure [Food labels] : food labels [Sodium] : sodium [Processed food] : processed food [Height: ___] : Height: [unfilled] [Weight: ___ lbs] : Weight: [unfilled] lbs [Does patient monitor weight at home?] : Does patient monitor weight at home? Yes [Patient will lose 0.5 to 1 lb per week] : Patient will lose 0.5 to 1lb per week [Monitoring of weight at home and at cardiac rehabilitation] : Monitoring of weight at home and at cardiac rehabilitation [Individualized exercise program as developed at cardiac rehabilitation] : Individualized exercise program as developed at cardiac rehabilitation [Motivation/Desire] : motivation/desire [Myocardial infraction] : myocardial infraction [Age] : age [HLD] : HLD [Hypertension] : hypertension [Sedentary] : sedentary [Overweight/Obesity] : overweight/obesity [Angina with exercise] : no angina with exercise [Fall Risk] : no fall risk [] : no [___ Days per week] : [unfilled] days per week [>= 31 minutes per session] : >= 31 minutes per session [Treadmill] : treadmill [Recumbent bike] : recumbent bike [Upright exercise bike] : upright exercise bike [BioStep] : BioStep [Elliptical] : elliptical [Upper body ergometer] : upper body ergometer [Stair Climber] : stair climber [Walking] : walking [Running] : running [Stretching/ROM exercises and balance exercises daily] : Stretching/ROM exercises and balance exercises daily [Will assess for musculoskeletal and other restrictions] : will assess for musculoskeletal and other restrictions [Perform aerobic activity for ___ consecutive minutes] : perform aerobic activity for [unfilled] consecutive minutes [To start resistance training] : to start resistance training [To return to my previous hobbies and activities] : to return to my previous hobbies and activities  [Exercise Benefits/Guidelines education] : exercise benefits/guidelines education [Individualized Exercise Rx] : individualized exercise Rx [Signs/Symptoms to report] : signs/symptoms to report [Hemodynamic responses] : hemodynamic responses [Home exercise] : home exercise [Patient verbalizes understanding of exercise education all questions answered] : Patient verbalizes understanding of exercise education all questions answered [Teach back utilized] : teach back utilized [Welcome packet provided] : welcome packet provided [FreeTextEntry6] : walking at home [de-identified] : Intake: Patient is active and walks approx 30 mins outside several times a week, no complaints with exercise. Able to perform ADLs independently [Has the patient given CR team permission to speak with the emergency  about the patient?] : Has the patient given CR team permission to speak with the emergency  about the patient? Yes [FreeTextEntry9] : Intake: Endorses good psychosocial wellbeing, she states she is very mindful and able to calm herself down in stressfull situations [Assessment] : Assessment [Action] : Action [Is patient on medication for hyperlipidemia?] : Is patient on medication for hyperlipidemia? Yes [Is Patient adherent with medication?] : patient is adherent with medication [Patient has seen registered dietitian in the past?] : Patient has seen registered dietitian in the past? Yes [Patient will include healthy diet pattern approaches to healthy eating] : Patient will include healthy diet pattern approaches to healthy eating [Self-reports of improved health eating patterns] : Self-reports of improved health eating patterns [Discuss an overview of healthy eating plan] : Discuss an overview of healthy eating plan [FreeTextEntry8] : Intake: Demonstrates understanding of heart healthy diet and measures that must be taken to ensure heart health. Eats a low fat, low sodium diet.

## 2023-08-29 ENCOUNTER — NON-APPOINTMENT (OUTPATIENT)
Age: 68
End: 2023-08-29

## 2023-10-11 ENCOUNTER — TRANSCRIPTION ENCOUNTER (OUTPATIENT)
Age: 68
End: 2023-10-11

## 2023-10-11 ENCOUNTER — NON-APPOINTMENT (OUTPATIENT)
Age: 68
End: 2023-10-11

## 2023-10-11 ENCOUNTER — APPOINTMENT (OUTPATIENT)
Dept: CARDIOLOGY | Facility: CLINIC | Age: 68
End: 2023-10-11
Payer: COMMERCIAL

## 2023-10-11 VITALS
HEART RATE: 74 BPM | HEIGHT: 66 IN | BODY MASS INDEX: 26.52 KG/M2 | SYSTOLIC BLOOD PRESSURE: 124 MMHG | WEIGHT: 165 LBS | DIASTOLIC BLOOD PRESSURE: 80 MMHG | OXYGEN SATURATION: 98 % | RESPIRATION RATE: 16 BRPM

## 2023-10-11 PROCEDURE — 93000 ELECTROCARDIOGRAM COMPLETE: CPT

## 2023-10-11 PROCEDURE — 99214 OFFICE O/P EST MOD 30 MIN: CPT | Mod: 25

## 2023-10-30 ENCOUNTER — APPOINTMENT (OUTPATIENT)
Dept: CARDIOLOGY | Facility: CLINIC | Age: 68
End: 2023-10-30
Payer: COMMERCIAL

## 2023-10-30 PROCEDURE — 93798 PHYS/QHP OP CAR RHAB W/ECG: CPT

## 2023-11-01 ENCOUNTER — APPOINTMENT (OUTPATIENT)
Dept: CARDIOLOGY | Facility: CLINIC | Age: 68
End: 2023-11-01
Payer: COMMERCIAL

## 2023-11-01 PROCEDURE — 93798 PHYS/QHP OP CAR RHAB W/ECG: CPT

## 2023-11-02 ENCOUNTER — APPOINTMENT (OUTPATIENT)
Dept: CARDIOLOGY | Facility: CLINIC | Age: 68
End: 2023-11-02
Payer: COMMERCIAL

## 2023-11-02 PROCEDURE — 93798 PHYS/QHP OP CAR RHAB W/ECG: CPT

## 2023-11-06 ENCOUNTER — APPOINTMENT (OUTPATIENT)
Dept: CARDIOLOGY | Facility: CLINIC | Age: 68
End: 2023-11-06
Payer: COMMERCIAL

## 2023-11-06 PROCEDURE — 93797 PHYS/QHP OP CAR RHAB WO ECG: CPT

## 2023-11-08 ENCOUNTER — APPOINTMENT (OUTPATIENT)
Dept: CARDIOLOGY | Facility: CLINIC | Age: 68
End: 2023-11-08
Payer: COMMERCIAL

## 2023-11-08 PROCEDURE — 93798 PHYS/QHP OP CAR RHAB W/ECG: CPT

## 2023-11-09 ENCOUNTER — APPOINTMENT (OUTPATIENT)
Dept: CARDIOLOGY | Facility: CLINIC | Age: 68
End: 2023-11-09
Payer: COMMERCIAL

## 2023-11-09 PROCEDURE — 93797 PHYS/QHP OP CAR RHAB WO ECG: CPT

## 2023-11-13 ENCOUNTER — APPOINTMENT (OUTPATIENT)
Dept: CARDIOLOGY | Facility: CLINIC | Age: 68
End: 2023-11-13
Payer: COMMERCIAL

## 2023-11-13 PROCEDURE — 93797 PHYS/QHP OP CAR RHAB WO ECG: CPT

## 2023-11-15 ENCOUNTER — APPOINTMENT (OUTPATIENT)
Dept: CARDIOLOGY | Facility: CLINIC | Age: 68
End: 2023-11-15
Payer: COMMERCIAL

## 2023-11-15 PROCEDURE — 93797 PHYS/QHP OP CAR RHAB WO ECG: CPT

## 2023-11-16 ENCOUNTER — APPOINTMENT (OUTPATIENT)
Dept: CARDIOLOGY | Facility: CLINIC | Age: 68
End: 2023-11-16
Payer: COMMERCIAL

## 2023-11-16 PROCEDURE — 93797 PHYS/QHP OP CAR RHAB WO ECG: CPT

## 2023-11-27 ENCOUNTER — APPOINTMENT (OUTPATIENT)
Dept: CARDIOLOGY | Facility: CLINIC | Age: 68
End: 2023-11-27
Payer: COMMERCIAL

## 2023-11-27 PROCEDURE — 93797 PHYS/QHP OP CAR RHAB WO ECG: CPT

## 2023-11-29 ENCOUNTER — APPOINTMENT (OUTPATIENT)
Dept: CARDIOLOGY | Facility: CLINIC | Age: 68
End: 2023-11-29
Payer: COMMERCIAL

## 2023-11-29 ENCOUNTER — NON-APPOINTMENT (OUTPATIENT)
Age: 68
End: 2023-11-29

## 2023-11-29 PROCEDURE — 93797 PHYS/QHP OP CAR RHAB WO ECG: CPT

## 2023-11-30 ENCOUNTER — APPOINTMENT (OUTPATIENT)
Dept: CARDIOLOGY | Facility: CLINIC | Age: 68
End: 2023-11-30
Payer: COMMERCIAL

## 2023-11-30 PROCEDURE — 93797 PHYS/QHP OP CAR RHAB WO ECG: CPT

## 2023-12-05 ENCOUNTER — NON-APPOINTMENT (OUTPATIENT)
Age: 68
End: 2023-12-05

## 2023-12-05 ENCOUNTER — APPOINTMENT (OUTPATIENT)
Dept: INTERNAL MEDICINE | Facility: CLINIC | Age: 68
End: 2023-12-05
Payer: COMMERCIAL

## 2023-12-05 VITALS
HEIGHT: 66 IN | SYSTOLIC BLOOD PRESSURE: 116 MMHG | BODY MASS INDEX: 25.71 KG/M2 | HEART RATE: 76 BPM | WEIGHT: 160 LBS | DIASTOLIC BLOOD PRESSURE: 69 MMHG

## 2023-12-05 DIAGNOSIS — Z00.00 ENCOUNTER FOR GENERAL ADULT MEDICAL EXAMINATION W/OUT ABNORMAL FINDINGS: ICD-10-CM

## 2023-12-05 PROCEDURE — 99387 INIT PM E/M NEW PAT 65+ YRS: CPT | Mod: 25

## 2023-12-05 PROCEDURE — 36415 COLL VENOUS BLD VENIPUNCTURE: CPT

## 2023-12-05 PROCEDURE — 93000 ELECTROCARDIOGRAM COMPLETE: CPT

## 2023-12-06 ENCOUNTER — CLINICAL ADVICE (OUTPATIENT)
Age: 68
End: 2023-12-06

## 2023-12-06 ENCOUNTER — APPOINTMENT (OUTPATIENT)
Dept: CARDIOLOGY | Facility: CLINIC | Age: 68
End: 2023-12-06
Payer: COMMERCIAL

## 2023-12-06 LAB
25(OH)D3 SERPL-MCNC: 23.6 NG/ML
ALBUMIN SERPL ELPH-MCNC: 4.6 G/DL
ALP BLD-CCNC: 129 U/L
ALT SERPL-CCNC: 15 U/L
ANION GAP SERPL CALC-SCNC: 13 MMOL/L
APPEARANCE: CLEAR
AST SERPL-CCNC: 18 U/L
BACTERIA: ABNORMAL /HPF
BASOPHILS # BLD AUTO: 0.04 K/UL
BASOPHILS NFR BLD AUTO: 0.5 %
BILIRUB DIRECT SERPL-MCNC: 0.2 MG/DL
BILIRUB INDIRECT SERPL-MCNC: 0.6 MG/DL
BILIRUB SERPL-MCNC: 0.8 MG/DL
BILIRUBIN URINE: NEGATIVE
BLOOD URINE: NEGATIVE
BUN SERPL-MCNC: 14 MG/DL
CALCIUM SERPL-MCNC: 9.7 MG/DL
CAST: 1 /LPF
CHLORIDE SERPL-SCNC: 98 MMOL/L
CHOLEST SERPL-MCNC: 133 MG/DL
CO2 SERPL-SCNC: 29 MMOL/L
COLOR: YELLOW
CREAT SERPL-MCNC: 0.88 MG/DL
EGFR: 72 ML/MIN/1.73M2
EOSINOPHIL # BLD AUTO: 0.14 K/UL
EOSINOPHIL NFR BLD AUTO: 1.9 %
EPITHELIAL CELLS: 2 /HPF
ESTIMATED AVERAGE GLUCOSE: 108 MG/DL
FERRITIN SERPL-MCNC: 52 NG/ML
FOLATE SERPL-MCNC: >20 NG/ML
GLUCOSE QUALITATIVE U: NEGATIVE MG/DL
GLUCOSE SERPL-MCNC: 84 MG/DL
HBA1C MFR BLD HPLC: 5.4 %
HCT VFR BLD CALC: 41.6 %
HCV AB SER QL: NONREACTIVE
HCV S/CO RATIO: 0.05 S/CO
HDLC SERPL-MCNC: 47 MG/DL
HGB BLD-MCNC: 12.9 G/DL
HIV1+2 AB SPEC QL IA.RAPID: NONREACTIVE
IMM GRANULOCYTES NFR BLD AUTO: 0.4 %
IRON SATN MFR SERPL: 29 %
IRON SERPL-MCNC: 99 UG/DL
KETONES URINE: NEGATIVE MG/DL
LDLC SERPL CALC-MCNC: 68 MG/DL
LEUKOCYTE ESTERASE URINE: NEGATIVE
LYMPHOCYTES # BLD AUTO: 3.05 K/UL
LYMPHOCYTES NFR BLD AUTO: 41.2 %
MAN DIFF?: NORMAL
MCHC RBC-ENTMCNC: 28.6 PG
MCHC RBC-ENTMCNC: 31 GM/DL
MCV RBC AUTO: 92.2 FL
MICROSCOPIC-UA: NORMAL
MONOCYTES # BLD AUTO: 0.49 K/UL
MONOCYTES NFR BLD AUTO: 6.6 %
NEUTROPHILS # BLD AUTO: 3.65 K/UL
NEUTROPHILS NFR BLD AUTO: 49.4 %
NITRITE URINE: NEGATIVE
NONHDLC SERPL-MCNC: 85 MG/DL
PH URINE: 7
PLATELET # BLD AUTO: 324 K/UL
POTASSIUM SERPL-SCNC: 3.9 MMOL/L
PROT SERPL-MCNC: 8.1 G/DL
PROTEIN URINE: NEGATIVE MG/DL
RBC # BLD: 4.51 M/UL
RBC # FLD: 14.9 %
RED BLOOD CELLS URINE: 0 /HPF
SODIUM SERPL-SCNC: 140 MMOL/L
SPECIFIC GRAVITY URINE: 1.01
TIBC SERPL-MCNC: 337 UG/DL
TRIGL SERPL-MCNC: 94 MG/DL
TSH SERPL-ACNC: 2.32 UIU/ML
UIBC SERPL-MCNC: 238 UG/DL
UROBILINOGEN URINE: 0.2 MG/DL
VIT B12 SERPL-MCNC: 439 PG/ML
WBC # FLD AUTO: 7.4 K/UL
WHITE BLOOD CELLS URINE: 0 /HPF

## 2023-12-06 PROCEDURE — 93797 PHYS/QHP OP CAR RHAB WO ECG: CPT

## 2023-12-07 ENCOUNTER — APPOINTMENT (OUTPATIENT)
Dept: CARDIOLOGY | Facility: CLINIC | Age: 68
End: 2023-12-07
Payer: COMMERCIAL

## 2023-12-07 PROCEDURE — 93797 PHYS/QHP OP CAR RHAB WO ECG: CPT

## 2023-12-11 ENCOUNTER — APPOINTMENT (OUTPATIENT)
Dept: CARDIOLOGY | Facility: CLINIC | Age: 68
End: 2023-12-11
Payer: COMMERCIAL

## 2023-12-11 PROCEDURE — 93797 PHYS/QHP OP CAR RHAB WO ECG: CPT

## 2023-12-13 ENCOUNTER — APPOINTMENT (OUTPATIENT)
Dept: CARDIOLOGY | Facility: CLINIC | Age: 68
End: 2023-12-13

## 2023-12-14 ENCOUNTER — APPOINTMENT (OUTPATIENT)
Dept: CARDIOLOGY | Facility: CLINIC | Age: 68
End: 2023-12-14

## 2023-12-18 ENCOUNTER — APPOINTMENT (OUTPATIENT)
Dept: CARDIOLOGY | Facility: CLINIC | Age: 68
End: 2023-12-18

## 2023-12-20 ENCOUNTER — APPOINTMENT (OUTPATIENT)
Dept: CARDIOLOGY | Facility: CLINIC | Age: 68
End: 2023-12-20

## 2023-12-21 ENCOUNTER — APPOINTMENT (OUTPATIENT)
Dept: CARDIOLOGY | Facility: CLINIC | Age: 68
End: 2023-12-21

## 2023-12-27 ENCOUNTER — APPOINTMENT (OUTPATIENT)
Dept: CARDIOLOGY | Facility: CLINIC | Age: 68
End: 2023-12-27

## 2023-12-28 ENCOUNTER — APPOINTMENT (OUTPATIENT)
Dept: CARDIOLOGY | Facility: CLINIC | Age: 68
End: 2023-12-28

## 2023-12-28 ENCOUNTER — RX RENEWAL (OUTPATIENT)
Age: 68
End: 2023-12-28

## 2024-01-03 ENCOUNTER — APPOINTMENT (OUTPATIENT)
Dept: CARDIOLOGY | Facility: CLINIC | Age: 69
End: 2024-01-03

## 2024-01-11 ENCOUNTER — APPOINTMENT (OUTPATIENT)
Dept: CARDIOLOGY | Facility: CLINIC | Age: 69
End: 2024-01-11

## 2024-01-17 ENCOUNTER — APPOINTMENT (OUTPATIENT)
Dept: CARDIOLOGY | Facility: CLINIC | Age: 69
End: 2024-01-17

## 2024-01-18 ENCOUNTER — APPOINTMENT (OUTPATIENT)
Dept: CARDIOLOGY | Facility: CLINIC | Age: 69
End: 2024-01-18

## 2024-01-22 ENCOUNTER — APPOINTMENT (OUTPATIENT)
Dept: CARDIOLOGY | Facility: CLINIC | Age: 69
End: 2024-01-22

## 2024-01-24 ENCOUNTER — APPOINTMENT (OUTPATIENT)
Dept: CARDIOLOGY | Facility: CLINIC | Age: 69
End: 2024-01-24

## 2024-01-25 ENCOUNTER — APPOINTMENT (OUTPATIENT)
Dept: CARDIOLOGY | Facility: CLINIC | Age: 69
End: 2024-01-25

## 2024-01-29 ENCOUNTER — APPOINTMENT (OUTPATIENT)
Dept: CARDIOLOGY | Facility: CLINIC | Age: 69
End: 2024-01-29

## 2024-01-31 ENCOUNTER — APPOINTMENT (OUTPATIENT)
Dept: CARDIOLOGY | Facility: CLINIC | Age: 69
End: 2024-01-31

## 2024-02-01 ENCOUNTER — APPOINTMENT (OUTPATIENT)
Dept: CARDIOLOGY | Facility: CLINIC | Age: 69
End: 2024-02-01

## 2024-02-05 ENCOUNTER — APPOINTMENT (OUTPATIENT)
Dept: CARDIOLOGY | Facility: CLINIC | Age: 69
End: 2024-02-05

## 2024-02-07 ENCOUNTER — APPOINTMENT (OUTPATIENT)
Dept: CARDIOLOGY | Facility: CLINIC | Age: 69
End: 2024-02-07

## 2024-02-08 ENCOUNTER — APPOINTMENT (OUTPATIENT)
Dept: CARDIOLOGY | Facility: CLINIC | Age: 69
End: 2024-02-08

## 2024-02-12 ENCOUNTER — APPOINTMENT (OUTPATIENT)
Dept: CARDIOLOGY | Facility: CLINIC | Age: 69
End: 2024-02-12

## 2024-02-26 ENCOUNTER — RX RENEWAL (OUTPATIENT)
Age: 69
End: 2024-02-26

## 2024-02-26 RX ORDER — SPIRONOLACTONE 25 MG/1
25 TABLET ORAL DAILY
Qty: 90 | Refills: 3 | Status: ACTIVE | COMMUNITY
Start: 2023-12-28 | End: 1900-01-01

## 2024-02-26 RX ORDER — BUMETANIDE 2 MG/1
2 TABLET ORAL
Qty: 90 | Refills: 3 | Status: ACTIVE | COMMUNITY
Start: 2023-12-28 | End: 1900-01-01

## 2024-02-28 ENCOUNTER — APPOINTMENT (OUTPATIENT)
Dept: CARDIOLOGY | Facility: CLINIC | Age: 69
End: 2024-02-28
Payer: COMMERCIAL

## 2024-02-28 ENCOUNTER — NON-APPOINTMENT (OUTPATIENT)
Age: 69
End: 2024-02-28

## 2024-02-28 VITALS — OXYGEN SATURATION: 97 % | BODY MASS INDEX: 25.55 KG/M2 | WEIGHT: 159 LBS | HEIGHT: 66 IN | HEART RATE: 80 BPM

## 2024-02-28 VITALS — SYSTOLIC BLOOD PRESSURE: 125 MMHG | DIASTOLIC BLOOD PRESSURE: 79 MMHG

## 2024-02-28 DIAGNOSIS — I25.5 ISCHEMIC CARDIOMYOPATHY: ICD-10-CM

## 2024-02-28 PROCEDURE — 99214 OFFICE O/P EST MOD 30 MIN: CPT | Mod: 25

## 2024-02-28 PROCEDURE — 93000 ELECTROCARDIOGRAM COMPLETE: CPT

## 2024-02-28 PROCEDURE — G2211 COMPLEX E/M VISIT ADD ON: CPT | Mod: NC,1L

## 2024-02-28 RX ORDER — CLOPIDOGREL BISULFATE 75 MG/1
75 TABLET, FILM COATED ORAL
Qty: 90 | Refills: 3 | Status: DISCONTINUED | COMMUNITY
Start: 2023-02-10 | End: 2024-02-28

## 2024-02-29 PROBLEM — I25.5 ISCHEMIC CARDIOMYOPATHY: Status: ACTIVE | Noted: 2023-02-01

## 2024-02-29 NOTE — CARDIOLOGY SUMMARY
[de-identified] : 02/28/24 - normal sinus rhythm, LAE, old anteroseptal infarct [de-identified] : 01/23/23 (MRI viability) - image quality markedly degraded due to motion artifact, in particular,  the late gadolinium enhancement sequences are limited due to motion; transmural scarring involving the mid anterior wall of the left  ventricle; there is likely microvascular obstruction involving the left ventricular apical anterior wall and majority of the septal wall, LVEF 36% [de-identified] : 01/22/23 (CATH) - pLAD 100% (faint collaterals from RPDA), mCx 50%, pOM2 40%, mRCA 20% [de-identified] : 07/27/23 - MAC, mild MR, LAE, severe global LV systolic dysfunction, severe LVE, severe diastolic dysfunction, normal RV size and function, mild TR, RVSP 37 mmHg, LVEF 15-20% 02/13/23 - MAC, mild-mod MR, mild LAE, severe segmental LV systolic dysfunction, no LV thrombus, 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

## 2024-02-29 NOTE — DISCUSSION/SUMMARY
[Coronary Artery Disease] : coronary artery disease [Systolic Heart Failure] : systolic heart failure [Stable] : stable [Compensated] : compensated [FreeTextEntry1] : Currently stable from a cardiovascular standpoint. Normotensive. Chronic systolic heart failure secondary to ischemic cardiomyopathy (history of late presentation anterior MI, LVEF 36-41% -> 19%). Currently appears euvolemic. No ischemic or CHF symptoms. Patient considered ACC/AHA CHF stage C. Continue current medications including bumetanide 2 mg daily, spironolactone and metoprolol succinate. ECG completed today and reviewed. Daily weights and low salt diet advised. Will obtain an echo to reassess her cardiac structures and function. Follow up in 3 months. [EKG obtained to assist in diagnosis and management of assessed problem(s)] : EKG obtained to assist in diagnosis and management of assessed problem(s)

## 2024-02-29 NOTE — PHYSICAL EXAM
[Well Nourished] : well nourished [Well Developed] : well developed [No Acute Distress] : no acute distress [Normal Conjunctiva] : normal conjunctiva [Normal Venous Pressure] : normal venous pressure [No Carotid Bruit] : no carotid bruit [Normal S1, S2] : normal S1, S2 [No Murmur] : no murmur [No Rub] : no rub [Clear Lung Fields] : clear lung fields [No Gallop] : no gallop [No Respiratory Distress] : no respiratory distress  [Soft] : abdomen soft [Good Air Entry] : good air entry [Non Tender] : non-tender [Normal Gait] : normal gait [No Edema] : no edema [No Cyanosis] : no cyanosis [No Rash] : no rash [No Skin Lesions] : no skin lesions [Moves all extremities] : moves all extremities [No Focal Deficits] : no focal deficits [Normal Speech] : normal speech [Alert and Oriented] : alert and oriented

## 2024-02-29 NOTE — HISTORY OF PRESENT ILLNESS
[FreeTextEntry1] : Currently doing well. Denies chest pain, shortness of breath or palpitations. Completed cardiac rehab. [place] issues.

## 2024-03-15 NOTE — PATIENT PROFILE ADULT - FUNCTIONAL ASSESSMENT - DAILY ACTIVITY 2.
She just comes in on the MA schedule for a TB test and comes back 2 days later to have it read.   4 = No assist / stand by assistance

## 2024-04-08 RX ORDER — ATORVASTATIN CALCIUM 40 MG/1
40 TABLET, FILM COATED ORAL
Qty: 90 | Refills: 3 | Status: ACTIVE | COMMUNITY
Start: 1900-01-01 | End: 1900-01-01

## 2024-04-08 RX ORDER — METOPROLOL SUCCINATE 25 MG/1
25 TABLET, EXTENDED RELEASE ORAL
Qty: 90 | Refills: 3 | Status: ACTIVE | COMMUNITY
Start: 1900-01-01 | End: 1900-01-01

## 2024-04-25 ENCOUNTER — APPOINTMENT (OUTPATIENT)
Dept: CV DIAGNOSITCS | Facility: HOSPITAL | Age: 69
End: 2024-04-25

## 2024-04-25 ENCOUNTER — OUTPATIENT (OUTPATIENT)
Dept: OUTPATIENT SERVICES | Facility: HOSPITAL | Age: 69
LOS: 1 days | End: 2024-04-25
Payer: COMMERCIAL

## 2024-04-25 ENCOUNTER — RESULT REVIEW (OUTPATIENT)
Age: 69
End: 2024-04-25

## 2024-04-25 DIAGNOSIS — I50.22 CHRONIC SYSTOLIC (CONGESTIVE) HEART FAILURE: ICD-10-CM

## 2024-04-25 PROBLEM — I25.2 OLD MYOCARDIAL INFARCTION: Chronic | Status: ACTIVE | Noted: 2023-02-11

## 2024-04-25 PROCEDURE — 93306 TTE W/DOPPLER COMPLETE: CPT | Mod: 26

## 2024-06-03 NOTE — DISCHARGE NOTE NURSING/CASE MANAGEMENT/SOCIAL WORK - CAREGIVER NAME
"WOC consulted for \"PI POA; stg 2 R coccyx\"  Pt seen in recliner, bedside nurse kindly helped to stand patient for assessment. Skin with hyperpigmentation, rough and dry indicative of chronic irritant contact dermatitis r/t moisture from sweat/stool/urine along with friction component. MASD noted to R gluteal.   Recommend Cleansing with cleansing foam, blue wipes and applying zguard 2x/day and with incontinence. Protect sacrum with Allevyn sacral dressing. Practice pressure prevention protocol.  L shin with skin tear reportedly from pet scratching her. Dressing changed with xeroform and mepilex; change Q3days.   WOC will sign off.           " Himanshu atwood

## 2024-06-26 ENCOUNTER — APPOINTMENT (OUTPATIENT)
Dept: CARDIOLOGY | Facility: CLINIC | Age: 69
End: 2024-06-26
Payer: COMMERCIAL

## 2024-06-26 ENCOUNTER — NON-APPOINTMENT (OUTPATIENT)
Age: 69
End: 2024-06-26

## 2024-06-26 VITALS
HEIGHT: 66 IN | DIASTOLIC BLOOD PRESSURE: 72 MMHG | WEIGHT: 155 LBS | BODY MASS INDEX: 24.91 KG/M2 | SYSTOLIC BLOOD PRESSURE: 112 MMHG | HEART RATE: 70 BPM | OXYGEN SATURATION: 95 %

## 2024-06-26 DIAGNOSIS — I25.10 ATHEROSCLEROTIC HEART DISEASE OF NATIVE CORONARY ARTERY W/OUT ANGINA PECTORIS: ICD-10-CM

## 2024-06-26 DIAGNOSIS — I50.22 CHRONIC SYSTOLIC (CONGESTIVE) HEART FAILURE: ICD-10-CM

## 2024-06-26 PROCEDURE — 99214 OFFICE O/P EST MOD 30 MIN: CPT | Mod: 25

## 2024-06-26 PROCEDURE — G2211 COMPLEX E/M VISIT ADD ON: CPT | Mod: NC

## 2024-06-26 PROCEDURE — 93000 ELECTROCARDIOGRAM COMPLETE: CPT

## 2024-08-20 NOTE — PATIENT PROFILE ADULT - FALL HARM RISK - PATIENT NEEDS ASSISTANCE
Left message for patient advising them of results and recommendations below per Dr. Alec Deshpande   Advised patient to call the office at [phone] if they have any questions.     No assistance needed

## 2024-10-01 ENCOUNTER — NON-APPOINTMENT (OUTPATIENT)
Age: 69
End: 2024-10-01

## 2024-10-29 NOTE — PROGRESS NOTE ADULT - PROBLEM SELECTOR PROBLEM 2
Group Topic: BH Check-in/Symptom Rating    Date: 10/29/2024  Start Time: 0830  End Time: 0915  Facilitators: Cathie Chase    Focus: Check In Group  Number in attendance: 2    Cathie Chase MS    Pt was recruited for group but did not attend. Efforts to encourage participation in programming on the unit will continue.     History of acute anterior wall MI

## 2024-10-30 ENCOUNTER — NON-APPOINTMENT (OUTPATIENT)
Age: 69
End: 2024-10-30

## 2024-10-30 ENCOUNTER — APPOINTMENT (OUTPATIENT)
Dept: CARDIOLOGY | Facility: CLINIC | Age: 69
End: 2024-10-30
Payer: COMMERCIAL

## 2024-10-30 VITALS
SYSTOLIC BLOOD PRESSURE: 110 MMHG | BODY MASS INDEX: 24.27 KG/M2 | OXYGEN SATURATION: 98 % | HEIGHT: 66 IN | WEIGHT: 151 LBS | DIASTOLIC BLOOD PRESSURE: 73 MMHG | HEART RATE: 64 BPM

## 2024-10-30 DIAGNOSIS — I50.22 CHRONIC SYSTOLIC (CONGESTIVE) HEART FAILURE: ICD-10-CM

## 2024-10-30 DIAGNOSIS — I25.10 ATHEROSCLEROTIC HEART DISEASE OF NATIVE CORONARY ARTERY W/OUT ANGINA PECTORIS: ICD-10-CM

## 2024-10-30 PROCEDURE — 93000 ELECTROCARDIOGRAM COMPLETE: CPT

## 2024-10-30 PROCEDURE — G2211 COMPLEX E/M VISIT ADD ON: CPT | Mod: NC

## 2024-10-30 PROCEDURE — 99214 OFFICE O/P EST MOD 30 MIN: CPT

## 2024-11-11 ENCOUNTER — NON-APPOINTMENT (OUTPATIENT)
Age: 69
End: 2024-11-11

## 2024-12-10 ENCOUNTER — APPOINTMENT (OUTPATIENT)
Dept: INTERNAL MEDICINE | Facility: CLINIC | Age: 69
End: 2024-12-10
Payer: COMMERCIAL

## 2024-12-10 VITALS
HEART RATE: 57 BPM | BODY MASS INDEX: 24.11 KG/M2 | WEIGHT: 150 LBS | HEIGHT: 66 IN | RESPIRATION RATE: 16 BRPM | OXYGEN SATURATION: 100 % | DIASTOLIC BLOOD PRESSURE: 77 MMHG | SYSTOLIC BLOOD PRESSURE: 116 MMHG

## 2024-12-10 DIAGNOSIS — I50.22 CHRONIC SYSTOLIC (CONGESTIVE) HEART FAILURE: ICD-10-CM

## 2024-12-10 DIAGNOSIS — Z00.00 ENCOUNTER FOR GENERAL ADULT MEDICAL EXAMINATION W/OUT ABNORMAL FINDINGS: ICD-10-CM

## 2024-12-10 PROCEDURE — 99397 PER PM REEVAL EST PAT 65+ YR: CPT

## 2024-12-10 PROCEDURE — 36415 COLL VENOUS BLD VENIPUNCTURE: CPT

## 2024-12-11 ENCOUNTER — CLINICAL ADVICE (OUTPATIENT)
Age: 69
End: 2024-12-11

## 2024-12-11 LAB
25(OH)D3 SERPL-MCNC: 24.8 NG/ML
ALBUMIN SERPL ELPH-MCNC: 4.4 G/DL
ALP BLD-CCNC: 115 U/L
ALT SERPL-CCNC: 16 U/L
ANION GAP SERPL CALC-SCNC: 13 MMOL/L
APPEARANCE: CLEAR
AST SERPL-CCNC: 17 U/L
BACTERIA: NEGATIVE /HPF
BASOPHILS # BLD AUTO: 0.03 K/UL
BASOPHILS NFR BLD AUTO: 0.4 %
BILIRUB DIRECT SERPL-MCNC: 0.2 MG/DL
BILIRUB INDIRECT SERPL-MCNC: 0.6 MG/DL
BILIRUB SERPL-MCNC: 0.8 MG/DL
BILIRUBIN URINE: NEGATIVE
BLOOD URINE: NEGATIVE
BUN SERPL-MCNC: 13 MG/DL
CALCIUM SERPL-MCNC: 9.8 MG/DL
CAST: 1 /LPF
CHLORIDE SERPL-SCNC: 98 MMOL/L
CHOLEST SERPL-MCNC: 128 MG/DL
CO2 SERPL-SCNC: 27 MMOL/L
COLOR: YELLOW
CREAT SERPL-MCNC: 0.82 MG/DL
EGFR: 78 ML/MIN/1.73M2
EOSINOPHIL # BLD AUTO: 0.17 K/UL
EOSINOPHIL NFR BLD AUTO: 2.2 %
EPITHELIAL CELLS: 2 /HPF
ESTIMATED AVERAGE GLUCOSE: 103 MG/DL
FERRITIN SERPL-MCNC: 61 NG/ML
FOLATE SERPL-MCNC: 18.4 NG/ML
GLUCOSE QUALITATIVE U: NEGATIVE MG/DL
GLUCOSE SERPL-MCNC: 93 MG/DL
HBA1C MFR BLD HPLC: 5.2 %
HCT VFR BLD CALC: 41.9 %
HDLC SERPL-MCNC: 48 MG/DL
HGB BLD-MCNC: 13.2 G/DL
IMM GRANULOCYTES NFR BLD AUTO: 0.1 %
IRON SATN MFR SERPL: 25 %
IRON SERPL-MCNC: 84 UG/DL
KETONES URINE: NEGATIVE MG/DL
LDLC SERPL CALC-MCNC: 66 MG/DL
LEUKOCYTE ESTERASE URINE: NEGATIVE
LYMPHOCYTES # BLD AUTO: 3.18 K/UL
LYMPHOCYTES NFR BLD AUTO: 40.5 %
MAN DIFF?: NORMAL
MCHC RBC-ENTMCNC: 29.5 PG
MCHC RBC-ENTMCNC: 31.5 G/DL
MCV RBC AUTO: 93.7 FL
MICROSCOPIC-UA: NORMAL
MONOCYTES # BLD AUTO: 0.5 K/UL
MONOCYTES NFR BLD AUTO: 6.4 %
NEUTROPHILS # BLD AUTO: 3.96 K/UL
NEUTROPHILS NFR BLD AUTO: 50.4 %
NITRITE URINE: NEGATIVE
NONHDLC SERPL-MCNC: 80 MG/DL
PH URINE: 7.5
PLATELET # BLD AUTO: 341 K/UL
POTASSIUM SERPL-SCNC: 4.2 MMOL/L
PROT SERPL-MCNC: 7.9 G/DL
PROTEIN URINE: NEGATIVE MG/DL
RBC # BLD: 4.47 M/UL
RBC # FLD: 13.6 %
RED BLOOD CELLS URINE: 0 /HPF
SODIUM SERPL-SCNC: 139 MMOL/L
SPECIFIC GRAVITY URINE: 1.01
TIBC SERPL-MCNC: 338 UG/DL
TRIGL SERPL-MCNC: 70 MG/DL
TSH SERPL-ACNC: 1.24 UIU/ML
UIBC SERPL-MCNC: 255 UG/DL
UROBILINOGEN URINE: 0.2 MG/DL
VIT B12 SERPL-MCNC: 541 PG/ML
WBC # FLD AUTO: 7.85 K/UL
WHITE BLOOD CELLS URINE: 0 /HPF

## 2025-02-18 ENCOUNTER — RX RENEWAL (OUTPATIENT)
Age: 70
End: 2025-02-18

## 2025-02-19 ENCOUNTER — RX RENEWAL (OUTPATIENT)
Age: 70
End: 2025-02-19

## 2025-02-19 RX ORDER — ASPIRIN 81 MG/1
81 TABLET, COATED ORAL
Qty: 90 | Refills: 3 | Status: ACTIVE | COMMUNITY
Start: 2025-02-19 | End: 1900-01-01

## 2025-02-24 ENCOUNTER — TRANSCRIPTION ENCOUNTER (OUTPATIENT)
Age: 70
End: 2025-02-24

## 2025-03-26 ENCOUNTER — APPOINTMENT (OUTPATIENT)
Dept: CARDIOLOGY | Facility: CLINIC | Age: 70
End: 2025-03-26
Payer: COMMERCIAL

## 2025-03-26 ENCOUNTER — NON-APPOINTMENT (OUTPATIENT)
Age: 70
End: 2025-03-26

## 2025-03-26 VITALS
SYSTOLIC BLOOD PRESSURE: 132 MMHG | HEART RATE: 66 BPM | OXYGEN SATURATION: 100 % | BODY MASS INDEX: 24.11 KG/M2 | WEIGHT: 150 LBS | HEIGHT: 66 IN | DIASTOLIC BLOOD PRESSURE: 76 MMHG

## 2025-03-26 VITALS — DIASTOLIC BLOOD PRESSURE: 76 MMHG | SYSTOLIC BLOOD PRESSURE: 128 MMHG

## 2025-03-26 DIAGNOSIS — I25.10 ATHEROSCLEROTIC HEART DISEASE OF NATIVE CORONARY ARTERY W/OUT ANGINA PECTORIS: ICD-10-CM

## 2025-03-26 DIAGNOSIS — I50.22 CHRONIC SYSTOLIC (CONGESTIVE) HEART FAILURE: ICD-10-CM

## 2025-03-26 PROCEDURE — 99213 OFFICE O/P EST LOW 20 MIN: CPT

## 2025-03-26 PROCEDURE — 93000 ELECTROCARDIOGRAM COMPLETE: CPT

## 2025-03-26 PROCEDURE — G2211 COMPLEX E/M VISIT ADD ON: CPT | Mod: NC

## 2025-06-15 NOTE — ED PROVIDER NOTE - WR ORDER ID 1
Goal Outcome Evaluation:  Plan of Care Reviewed With: patient        Progress: no change  Outcome Evaluation: vitals stable.  the pt refused orthostatic BPs.  education completed.  the pt still refused.  the pt denied pain.  meds given per orders.  wound care completed per orders.  CIWA scores noted.  will continue to monitor.                              [id number]

## 2025-06-26 ENCOUNTER — APPOINTMENT (OUTPATIENT)
Dept: CV DIAGNOSITCS | Facility: HOSPITAL | Age: 70
End: 2025-06-26

## 2025-06-26 ENCOUNTER — RESULT REVIEW (OUTPATIENT)
Age: 70
End: 2025-06-26

## 2025-06-26 ENCOUNTER — OUTPATIENT (OUTPATIENT)
Dept: OUTPATIENT SERVICES | Facility: HOSPITAL | Age: 70
LOS: 1 days | End: 2025-06-26
Payer: COMMERCIAL

## 2025-06-26 DIAGNOSIS — I50.22 CHRONIC SYSTOLIC (CONGESTIVE) HEART FAILURE: ICD-10-CM

## 2025-06-26 PROCEDURE — 93306 TTE W/DOPPLER COMPLETE: CPT | Mod: 26

## 2025-06-27 RX ORDER — LOSARTAN POTASSIUM 25 MG/1
25 TABLET, FILM COATED ORAL DAILY
Qty: 90 | Refills: 3 | Status: ACTIVE | COMMUNITY
Start: 2025-06-27 | End: 1900-01-01

## 2025-08-27 ENCOUNTER — APPOINTMENT (OUTPATIENT)
Dept: CARDIOLOGY | Facility: CLINIC | Age: 70
End: 2025-08-27
Payer: COMMERCIAL

## 2025-08-27 ENCOUNTER — NON-APPOINTMENT (OUTPATIENT)
Age: 70
End: 2025-08-27

## 2025-08-27 VITALS
OXYGEN SATURATION: 99 % | HEART RATE: 61 BPM | HEIGHT: 66 IN | BODY MASS INDEX: 24.11 KG/M2 | SYSTOLIC BLOOD PRESSURE: 115 MMHG | DIASTOLIC BLOOD PRESSURE: 70 MMHG | WEIGHT: 150 LBS

## 2025-08-27 DIAGNOSIS — I50.22 CHRONIC SYSTOLIC (CONGESTIVE) HEART FAILURE: ICD-10-CM

## 2025-08-27 DIAGNOSIS — I25.10 ATHEROSCLEROTIC HEART DISEASE OF NATIVE CORONARY ARTERY W/OUT ANGINA PECTORIS: ICD-10-CM

## 2025-08-27 PROCEDURE — 99204 OFFICE O/P NEW MOD 45 MIN: CPT

## 2025-08-27 PROCEDURE — 99214 OFFICE O/P EST MOD 30 MIN: CPT

## 2025-08-27 PROCEDURE — 93000 ELECTROCARDIOGRAM COMPLETE: CPT

## 2025-08-27 PROCEDURE — G2211 COMPLEX E/M VISIT ADD ON: CPT | Mod: NC
